# Patient Record
Sex: FEMALE | Race: WHITE | NOT HISPANIC OR LATINO | Employment: FULL TIME | URBAN - NONMETROPOLITAN AREA
[De-identification: names, ages, dates, MRNs, and addresses within clinical notes are randomized per-mention and may not be internally consistent; named-entity substitution may affect disease eponyms.]

---

## 2020-01-16 ENCOUNTER — LAB (OUTPATIENT)
Dept: LAB | Facility: OTHER | Age: 30
End: 2020-01-16

## 2020-01-16 ENCOUNTER — OFFICE VISIT (OUTPATIENT)
Dept: FAMILY MEDICINE CLINIC | Facility: CLINIC | Age: 30
End: 2020-01-16

## 2020-01-16 VITALS
DIASTOLIC BLOOD PRESSURE: 73 MMHG | HEART RATE: 77 BPM | OXYGEN SATURATION: 98 % | HEIGHT: 62 IN | TEMPERATURE: 97.9 F | SYSTOLIC BLOOD PRESSURE: 118 MMHG | WEIGHT: 228.3 LBS | RESPIRATION RATE: 16 BRPM | BODY MASS INDEX: 42.01 KG/M2

## 2020-01-16 DIAGNOSIS — Z13.21 ENCOUNTER FOR VITAMIN DEFICIENCY SCREENING: ICD-10-CM

## 2020-01-16 DIAGNOSIS — M47.812 PRIMARY OSTEOARTHRITIS OF CERVICAL SPINE: ICD-10-CM

## 2020-01-16 DIAGNOSIS — D50.9 MICROCYTIC ANEMIA: ICD-10-CM

## 2020-01-16 DIAGNOSIS — G89.29 CHRONIC BILATERAL LOW BACK PAIN WITH BILATERAL SCIATICA: ICD-10-CM

## 2020-01-16 DIAGNOSIS — M54.42 CHRONIC BILATERAL LOW BACK PAIN WITH BILATERAL SCIATICA: ICD-10-CM

## 2020-01-16 DIAGNOSIS — E66.9 ABDOMINAL OBESITY AND METABOLIC SYNDROME: ICD-10-CM

## 2020-01-16 DIAGNOSIS — Z02.83 ENCOUNTER FOR DRUG SCREENING: ICD-10-CM

## 2020-01-16 DIAGNOSIS — Z13.29 SCREENING FOR THYROID DISORDER: ICD-10-CM

## 2020-01-16 DIAGNOSIS — K21.9 GASTROESOPHAGEAL REFLUX DISEASE, ESOPHAGITIS PRESENCE NOT SPECIFIED: ICD-10-CM

## 2020-01-16 DIAGNOSIS — M54.2 CHRONIC MIDLINE POSTERIOR NECK PAIN: Primary | ICD-10-CM

## 2020-01-16 DIAGNOSIS — G89.29 CHRONIC MIDLINE POSTERIOR NECK PAIN: Primary | ICD-10-CM

## 2020-01-16 DIAGNOSIS — M54.41 CHRONIC BILATERAL LOW BACK PAIN WITH BILATERAL SCIATICA: ICD-10-CM

## 2020-01-16 DIAGNOSIS — M50.30 DDD (DEGENERATIVE DISC DISEASE), CERVICAL: ICD-10-CM

## 2020-01-16 DIAGNOSIS — E88.81 ABDOMINAL OBESITY AND METABOLIC SYNDROME: ICD-10-CM

## 2020-01-16 LAB
BASOPHILS # BLD AUTO: 0.08 10*3/MM3 (ref 0–0.2)
BASOPHILS NFR BLD AUTO: 0.8 % (ref 0–1.5)
DEPRECATED RDW RBC AUTO: 43.6 FL (ref 37–54)
EOSINOPHIL # BLD AUTO: 0.4 10*3/MM3 (ref 0–0.4)
EOSINOPHIL NFR BLD AUTO: 3.9 % (ref 0.3–6.2)
ERYTHROCYTE [DISTWIDTH] IN BLOOD BY AUTOMATED COUNT: 16 % (ref 12.3–15.4)
HCT VFR BLD AUTO: 37.9 % (ref 34–46.6)
HGB BLD-MCNC: 12.1 G/DL (ref 12–15.9)
LYMPHOCYTES # BLD AUTO: 3.01 10*3/MM3 (ref 0.7–3.1)
LYMPHOCYTES NFR BLD AUTO: 29.5 % (ref 19.6–45.3)
MCH RBC QN AUTO: 24.5 PG (ref 26.6–33)
MCHC RBC AUTO-ENTMCNC: 31.9 G/DL (ref 31.5–35.7)
MCV RBC AUTO: 76.9 FL (ref 79–97)
MONOCYTES # BLD AUTO: 0.57 10*3/MM3 (ref 0.1–0.9)
MONOCYTES NFR BLD AUTO: 5.6 % (ref 5–12)
NEUTROPHILS # BLD AUTO: 6.13 10*3/MM3 (ref 1.7–7)
NEUTROPHILS NFR BLD AUTO: 60.2 % (ref 42.7–76)
PLATELET # BLD AUTO: 332 10*3/MM3 (ref 140–450)
PMV BLD AUTO: 9.6 FL (ref 6–12)
RBC # BLD AUTO: 4.93 10*6/MM3 (ref 3.77–5.28)
WBC NRBC COR # BLD: 10.19 10*3/MM3 (ref 3.4–10.8)

## 2020-01-16 PROCEDURE — 84439 ASSAY OF FREE THYROXINE: CPT | Performed by: NURSE PRACTITIONER

## 2020-01-16 PROCEDURE — 84443 ASSAY THYROID STIM HORMONE: CPT | Performed by: NURSE PRACTITIONER

## 2020-01-16 PROCEDURE — 99214 OFFICE O/P EST MOD 30 MIN: CPT | Performed by: NURSE PRACTITIONER

## 2020-01-16 PROCEDURE — 82728 ASSAY OF FERRITIN: CPT | Performed by: NURSE PRACTITIONER

## 2020-01-16 PROCEDURE — 82746 ASSAY OF FOLIC ACID SERUM: CPT | Performed by: NURSE PRACTITIONER

## 2020-01-16 PROCEDURE — 82306 VITAMIN D 25 HYDROXY: CPT | Performed by: NURSE PRACTITIONER

## 2020-01-16 PROCEDURE — 82607 VITAMIN B-12: CPT | Performed by: NURSE PRACTITIONER

## 2020-01-16 PROCEDURE — 83525 ASSAY OF INSULIN: CPT | Performed by: NURSE PRACTITIONER

## 2020-01-16 PROCEDURE — 83540 ASSAY OF IRON: CPT | Performed by: NURSE PRACTITIONER

## 2020-01-16 PROCEDURE — 85025 COMPLETE CBC W/AUTO DIFF WBC: CPT | Performed by: NURSE PRACTITIONER

## 2020-01-16 PROCEDURE — G0481 DRUG TEST DEF 8-14 CLASSES: HCPCS | Performed by: NURSE PRACTITIONER

## 2020-01-16 PROCEDURE — 84466 ASSAY OF TRANSFERRIN: CPT | Performed by: NURSE PRACTITIONER

## 2020-01-16 PROCEDURE — 83036 HEMOGLOBIN GLYCOSYLATED A1C: CPT | Performed by: NURSE PRACTITIONER

## 2020-01-16 PROCEDURE — 85660 RBC SICKLE CELL TEST: CPT | Performed by: NURSE PRACTITIONER

## 2020-01-16 PROCEDURE — 80307 DRUG TEST PRSMV CHEM ANLYZR: CPT | Performed by: NURSE PRACTITIONER

## 2020-01-16 PROCEDURE — 83527 ASSAY OF INSULIN: CPT | Performed by: NURSE PRACTITIONER

## 2020-01-16 PROCEDURE — 84480 ASSAY TRIIODOTHYRONINE (T3): CPT | Performed by: NURSE PRACTITIONER

## 2020-01-16 PROCEDURE — 83021 HEMOGLOBIN CHROMOTOGRAPHY: CPT | Performed by: NURSE PRACTITIONER

## 2020-01-16 RX ORDER — MELOXICAM 7.5 MG/1
7.5 TABLET ORAL DAILY
Qty: 30 TABLET | Refills: 0 | Status: SHIPPED | OUTPATIENT
Start: 2020-01-16 | End: 2020-02-10 | Stop reason: ALTCHOICE

## 2020-01-16 RX ORDER — PANTOPRAZOLE SODIUM 40 MG/1
40 TABLET, DELAYED RELEASE ORAL DAILY
Qty: 90 TABLET | Refills: 1 | Status: SHIPPED | OUTPATIENT
Start: 2020-01-16 | End: 2020-09-10 | Stop reason: SDUPTHER

## 2020-01-16 NOTE — PROGRESS NOTES
Chief Complaint   Patient presents with   • Establish Care     Subjective   Marizol WATKINS is a 29 y.o. female who presents to the office for evaluation of posterior neck pain and superior shoulder/ upper back pain. Also with pain lower back nightly that wakes her.     The following portions of the patient's history were reviewed and updated as appropriate: allergies, current medications, past family history, past medical history, past social history, past surgical history and problem list.    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    Chronic neck pain:  She has been experiencing increasing pain posterior neck over spine for past year. Cervical spine xray 3/8/19 from [facility] shows mild DDD with some spurs at C5-6. Loss of cervical lordosis. Rates pain at 6/10. May go as high as 8/10, which makes her feel like crying. May get as low as 4/10 with rest.  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 Sometimes ice and heat application will help.  She has been seeing a chiropractor since November with mild to moderate improvement noted lasting for a day or two and it returns to previous levels.    Chronic bilateral lower back pain with intermittent bilateral sciatica, usually on right. Onset over a year ago. Currently rates at 2/10. May get as high as 8/10 with bending, long term standing or walking, lifting, long term sitting. Resting improves the pain, and most likely to be lowest pain when has been asleep a while, however sometimes when she wakes up it is also bad and there is much stiffness.    She has seen GIL Coats in the past and an xray is on file for neck.           1. AT C5-6, MILD DEGENERATIVE DISC AND SPUR CHANGE WITH LOSS OF THE NORMAL CERVICAL LORDOSIS.      2. NO ACUTE OR SUSPICIOUS FINDINGS.    [id number]   Result Narrative   CERVICAL SPINE WITH OBLIQUES 5  VIEWS    Comparisons: 2/20/2006    Indications: Numbness and tingling of both hands and arms, neck pain    Discussion: There is loss of the normal cervical lordosis centered about C5-6 similar to the prior exam. There are mild degenerative disc and spur changes at C5-6 which are new from the prior study. Oblique imaging shows no foraminal encroachment. The   graft there is no acute or suspicious finding and there are no subluxations. The paraspinal soft tissues are normal.   Other Result Information   Gene, Rad Results In - 03/08/2019 12:50 PM CST       Past Medical History:   Diagnosis Date   • Acute bronchitis    • Anxiety    • Bipolar disease, chronic (CMS/HCC)    • Cough    • Itch of skin    • Mild depression (CMS/HCC)    • Visit for gynecologic examination           Family History   Problem Relation Age of Onset   • No Known Problems Mother    • Diabetes Father    • Heart disease Father    • Hypertension Maternal Grandmother    • Breast cancer Paternal Grandmother    • Colon cancer Neg Hx    • Endometrial cancer Neg Hx    • Ovarian cancer Neg Hx         Review of Systems   Constitutional: Positive for fatigue. Negative for fever and unexpected weight change.   HENT: Negative.    Eyes: Negative.    Respiratory: Negative.  Negative for cough, chest tightness and shortness of breath.    Cardiovascular: Negative.  Negative for chest pain.   Gastrointestinal: Negative.    Endocrine: Negative.    Genitourinary: Negative.  Negative for dysuria.   Musculoskeletal: Positive for arthralgias, back pain and neck pain.   Skin: Negative.  Negative for color change, pallor, rash and wound.   Allergic/Immunologic: Negative.    Neurological: Negative.    Hematological: Negative.    Psychiatric/Behavioral: Negative.  Negative for sleep disturbance and suicidal ideas.   All other systems reviewed and are negative.      Objective   Vitals:    01/16/20 1550   BP: 118/73   BP Location: Left arm   Patient Position: Sitting   Cuff  "Size: Adult   Pulse: 77   Resp: 16   Temp: 97.9 °F (36.6 °C)   TempSrc: Tympanic   SpO2: 98%   Weight: 104 kg (228 lb 4.8 oz)   Height: 157.5 cm (62\")   PainSc:   6   PainLoc: Neck     Physical Exam   Constitutional: She is oriented to person, place, and time. She appears well-developed and well-nourished.   HENT:   Head: Normocephalic and atraumatic.   Eyes: Pupils are equal, round, and reactive to light. Conjunctivae are normal.   Neck: Normal range of motion. Neck supple.   Cardiovascular: Normal rate, regular rhythm, normal heart sounds and intact distal pulses. Exam reveals no gallop and no friction rub.   No murmur heard.  Pulmonary/Chest: Effort normal and breath sounds normal. No respiratory distress. She has no wheezes. She has no rales. She exhibits no tenderness.   Abdominal: Soft. Bowel sounds are normal.   Musculoskeletal: Normal range of motion. She exhibits no edema, tenderness or deformity.   Lymphadenopathy:     She has no cervical adenopathy.   Neurological: She is alert and oriented to person, place, and time.   Skin: Skin is warm and dry. Capillary refill takes 2 to 3 seconds. No erythema. No pallor.   Psychiatric: She has a normal mood and affect. Her behavior is normal. Judgment and thought content normal.   Nursing note and vitals reviewed.      Assessment/Plan   Marizol was seen today for establish care.    Diagnoses and all orders for this visit:    Chronic midline posterior neck pain    Chronic bilateral low back pain with bilateral sciatica  -     XR Spine Lumbar 2 or 3 View    DDD (degenerative disc disease), cervical    Abdominal obesity and metabolic syndrome  -     CBC & Differential  -     Hemoglobin A1c  -     Insulin, Free & Total, Serum    Microcytic anemia  -     CBC & Differential  -     Ferritin  -     Iron Profile  -     Hgb. Frac. Profile; Future    Screening for thyroid disorder  -     T4, Free  -     TSH  -     T3    Encounter for drug screening  -     ToxASSURE Select 13 " Discrete -    Encounter for vitamin deficiency screening  -     Vitamin B12 & Folate  -     Vitamin D 25 Hydroxy    Gastroesophageal reflux disease, esophagitis presence not specified  -     pantoprazole (PROTONIX) 40 MG EC tablet; Take 1 tablet by mouth Daily. For acid reflux    Primary osteoarthritis of cervical spine  -     meloxicam (MOBIC) 7.5 MG tablet; Take 1 tablet by mouth Daily. For arthritis pain           PHQ-2/PHQ-9 Depression Screening 1/16/2020   Little interest or pleasure in doing things 2   Feeling down, depressed, or hopeless 2   Trouble falling or staying asleep, or sleeping too much 2   Feeling tired or having little energy 2   Poor appetite or overeating 2   Feeling bad about yourself - or that you are a failure or have let yourself or your family down 0   Trouble concentrating on things, such as reading the newspaper or watching television 2   Moving or speaking so slowly that other people could have noticed. Or the opposite - being so fidgety or restless that you have been moving around a lot more than usual 0   Thoughts that you would be better off dead, or of hurting yourself in some way 0   Total Score 12       GIL Darling         Return in about 1 week (around 1/23/2020).    There are no Patient Instructions on file for this visit.

## 2020-01-17 LAB
25(OH)D3 SERPL-MCNC: 25.3 NG/ML (ref 30–100)
FERRITIN SERPL-MCNC: 9.96 NG/ML (ref 13–150)
FOLATE SERPL-MCNC: 9.91 NG/ML (ref 4.78–24.2)
HBA1C MFR BLD: 5.7 % (ref 4.8–5.6)
IRON 24H UR-MRATE: 34 MCG/DL (ref 37–145)
IRON SATN MFR SERPL: 7 % (ref 20–50)
T3 SERPL-MCNC: 150 NG/DL (ref 80–200)
T4 FREE SERPL-MCNC: 1.35 NG/DL (ref 0.93–1.7)
TIBC SERPL-MCNC: 514 MCG/DL (ref 298–536)
TRANSFERRIN SERPL-MCNC: 345 MG/DL (ref 200–360)
TSH SERPL DL<=0.05 MIU/L-ACNC: 1.72 UIU/ML (ref 0.27–4.2)
VIT B12 BLD-MCNC: 587 PG/ML (ref 211–946)

## 2020-01-20 LAB
HGB A MFR BLD: 98.1 % (ref 96.4–98.8)
HGB A2 MFR BLD COLUMN CHROM: 1.9 % (ref 1.8–3.2)
HGB C MFR BLD: 0 %
HGB F MFR BLD: 0 % (ref 0–2)
HGB FRACT BLD-IMP: NORMAL
HGB S BLD QL SOLY: NEGATIVE
HGB S MFR BLD: 0 %

## 2020-01-22 LAB
6-ACETYLMORPHINE: NEGATIVE
6MAM SERPLBLD-MCNC: NOT DETECTED NG/MG CREAT
7-AMINOCLONAZEPAM UR: NOT DETECTED NG/MG CREAT
A-OH ALPRAZ/CREAT UR: NOT DETECTED NG/MG CREAT
ALFENTANIL UR QL: NOT DETECTED NG/MG CREAT
ALPHA-HYDROXYMIDAZOLAM, URINE: NOT DETECTED NG/MG CREAT
ALPHA-HYDROXYTRIAZOLAM, URINE: NOT DETECTED NG/MG CREAT
AMOBARBITAL UR: NOT DETECTED
AMPHET UR QL CFM: NOT DETECTED NG/MG CREAT
AMPHETAMINES UR QL SCN: NEGATIVE
BARBITAL UR QL CFM: NOT DETECTED
BARBITURATES UR QL SCN: NEGATIVE
BENZODIAZ UR QL SCN: NEGATIVE
BENZOYLECGONINE UR: NOT DETECTED NG/MG CREAT
BUPRENORPHINE UR QL: NEGATIVE
BUPRENORPHINE UR QL: NOT DETECTED NG/MG CREAT
BUTABARBITAL UR QL: NOT DETECTED
BUTALBITAL UR QL: NOT DETECTED
CANNABINOIDS UR QL CFM: NEGATIVE
CLONAZEPAM UR QL: NOT DETECTED NG/MG CREAT
COCAETHYLENE UR QL CFM: NOT DETECTED NG/MG CREAT
COCAINE UR QL CFM: NEGATIVE
CODEINE UR QL: NOT DETECTED NG/MG CREAT
CONV COCAINE, UR: NOT DETECTED NG/MG CREAT
CONV REPORT SUMMARY: NORMAL
CREAT 24H UR-MCNC: 231 MG/DL
DESALKYLFLURAZ/CREAT UR: NOT DETECTED NG/MG CREAT
DESMETHYLFLUNITRAZEPAM: NOT DETECTED NG/MG CREAT
DIAZEPAM UR-MCNC: NOT DETECTED NG/MG CREAT
DIHYDROCODEINE UR: 27 NG/MG CREAT
EDDP SERPL QL: NOT DETECTED NG/MG CREAT
ETHANOL SCREEN URINE (REF): NEGATIVE
ETHANOL UR-MCNC: NOT DETECTED G/DL
FENTANYL UR QL: NEGATIVE
FENTANYL+NORFENTANYL UR QL SCN: NOT DETECTED NG/MG CREAT
FLUNITRAZEPAM SERPLBLD-MCNC: NOT DETECTED NG/MG CREAT
HYDROCODONE UR QL: 45 NG/MG CREAT
HYDROMORPHONE UR QL: NOT DETECTED NG/MG CREAT
LEVEL OF DETECTION:: NORMAL
LORAZEPAM UR-MCNC: NOT DETECTED NG/MG CREAT
LORAZEPAM/CREAT UR: NOT DETECTED NG/MG CREAT
MDA SERPLBLD-MCNC: NOT DETECTED NG/MG CREAT
MDMA UR QL SCN: NOT DETECTED NG/MG CREAT
MEPHOBARBITAL UR QL CFM: NOT DETECTED
METHADONE BLD QL SCN: NEGATIVE
METHADONE, URINE: NOT DETECTED NG/MG CREAT
METHAMPHETAMINE UR: NOT DETECTED NG/MG CREAT
MIDAZOLAM UR-MCNC: NOT DETECTED NG/MG CREAT
MORPHINE UR QL: NOT DETECTED NG/MG CREAT
N-DESMETHYLTRAMADOL, U: NOT DETECTED NG/MG CREAT
NARCOTICS UR: NEGATIVE
NORBUPRENORPHINE SERPLBLD-MCNC: NOT DETECTED NG/MG CREAT
NORCODEINE UR-MCNC: NOT DETECTED NG/MG CREAT
NORDIAZEPAM SERPL CFM-MCNC: NOT DETECTED NG/MG CREAT
NORFENTANYL UR: NOT DETECTED NG/MG CREAT
NORMORPHINE: NOT DETECTED NG/MG CREAT
NOROXYMORPHONE: NOT DETECTED NG/MG CREAT
O-DESMETHYLTRAMADOL, UR: NOT DETECTED NG/MG CREAT
OPIATES UR QL CFM: NORMAL
OPIATES, URINE, NORHYDROCODONE: 63 NG/MG CREAT
OPIATES, URINE, NOROXYCODONE: NOT DETECTED NG/MG CREAT
OXAZEPAM UR QL: NOT DETECTED NG/MG CREAT
OXYCODONE UR QL: NEGATIVE
OXYCODONE UR: NOT DETECTED NG/MG CREAT
OXYMORPHONE UR: NOT DETECTED NG/MG CREAT
PENTOBARBITAL UR: NOT DETECTED
PHENOBARB UR QL: NOT DETECTED
SECOBARBITAL UR QL: NOT DETECTED
SUFENTANIL UR QL: NOT DETECTED NG/MG CREAT
TAPENTADOL SERPLBLD-MCNC: NEGATIVE NG/ML
TAPENTADOL UR-MCNC: NOT DETECTED NG/MG CREAT
TEMAZEPAM UR QL CFM: NOT DETECTED NG/MG CREAT
THC UR CFM-MCNC: NOT DETECTED NG/MG CREAT
THIOPENTAL UR QL CFM: NOT DETECTED
TRAMADOL UR: NOT DETECTED NG/MG CREAT

## 2020-01-23 ENCOUNTER — OFFICE VISIT (OUTPATIENT)
Dept: FAMILY MEDICINE CLINIC | Facility: CLINIC | Age: 30
End: 2020-01-23

## 2020-01-23 VITALS
OXYGEN SATURATION: 99 % | HEART RATE: 99 BPM | TEMPERATURE: 98.6 F | DIASTOLIC BLOOD PRESSURE: 64 MMHG | WEIGHT: 229.8 LBS | HEIGHT: 62 IN | BODY MASS INDEX: 42.29 KG/M2 | SYSTOLIC BLOOD PRESSURE: 104 MMHG | RESPIRATION RATE: 16 BRPM

## 2020-01-23 DIAGNOSIS — M54.41 CHRONIC BILATERAL LOW BACK PAIN WITH BILATERAL SCIATICA: ICD-10-CM

## 2020-01-23 DIAGNOSIS — M54.2 CHRONIC MIDLINE POSTERIOR NECK PAIN: ICD-10-CM

## 2020-01-23 DIAGNOSIS — E61.1 IRON DEFICIENCY: ICD-10-CM

## 2020-01-23 DIAGNOSIS — R73.03 PREDIABETES: ICD-10-CM

## 2020-01-23 DIAGNOSIS — G89.29 CHRONIC BILATERAL LOW BACK PAIN WITH BILATERAL SCIATICA: ICD-10-CM

## 2020-01-23 DIAGNOSIS — E55.9 VITAMIN D DEFICIENCY: ICD-10-CM

## 2020-01-23 DIAGNOSIS — M54.42 CHRONIC BILATERAL LOW BACK PAIN WITH BILATERAL SCIATICA: ICD-10-CM

## 2020-01-23 DIAGNOSIS — M50.30 DDD (DEGENERATIVE DISC DISEASE), CERVICAL: ICD-10-CM

## 2020-01-23 DIAGNOSIS — G89.29 CHRONIC MIDLINE POSTERIOR NECK PAIN: ICD-10-CM

## 2020-01-23 DIAGNOSIS — M51.34 DDD (DEGENERATIVE DISC DISEASE), THORACIC: Primary | ICD-10-CM

## 2020-01-23 PROCEDURE — 99214 OFFICE O/P EST MOD 30 MIN: CPT | Performed by: NURSE PRACTITIONER

## 2020-01-23 RX ORDER — LANOLIN ALCOHOL/MO/W.PET/CERES
325 CREAM (GRAM) TOPICAL
Qty: 90 TABLET | Refills: 0 | Status: SHIPPED | OUTPATIENT
Start: 2020-01-23 | End: 2020-04-22 | Stop reason: SDUPTHER

## 2020-01-23 RX ORDER — HYDROCODONE BITARTRATE AND ACETAMINOPHEN 5; 325 MG/1; MG/1
1 TABLET ORAL 2 TIMES DAILY PRN
Qty: 30 TABLET | Refills: 0 | Status: SHIPPED | OUTPATIENT
Start: 2020-01-23 | End: 2020-01-28 | Stop reason: SDUPTHER

## 2020-01-23 RX ORDER — METFORMIN HYDROCHLORIDE 500 MG/1
1000 TABLET, EXTENDED RELEASE ORAL
Qty: 30 TABLET | Refills: 5 | Status: SHIPPED | OUTPATIENT
Start: 2020-01-23 | End: 2020-04-22 | Stop reason: SDUPTHER

## 2020-01-23 RX ORDER — HYDROCODONE BITARTRATE AND ACETAMINOPHEN 5; 325 MG/1; MG/1
1 TABLET ORAL 2 TIMES DAILY PRN
Qty: 30 TABLET | Refills: 0 | Status: SHIPPED | OUTPATIENT
Start: 2020-01-23 | End: 2020-01-23 | Stop reason: SDUPTHER

## 2020-01-23 NOTE — PATIENT INSTRUCTIONS
Vitamin D Deficiency  Vitamin D deficiency is when your body does not have enough vitamin D. Vitamin D is important to your body because:  · It helps your body use other minerals.  · It helps to keep your bones strong and healthy.  · It may help to prevent some diseases.  · It helps your heart and other muscles work well.  Not getting enough vitamin D can make your bones soft. It can also cause other health problems.  What are the causes?  This condition may be caused by:  · Not eating enough foods that contain vitamin D.  · Not getting enough sun.  · Having diseases that make it hard for your body to absorb vitamin D.  · Having a surgery in which a part of the stomach or a part of the small intestine is removed.  · Having kidney disease or liver disease.  What increases the risk?  You are more likely to get this condition if:  · You are older.  · You do not spend much time outdoors.  · You live in a nursing home.  · You have had broken bones.  · You have weak or thin bones (osteoporosis).  · You have a disease or condition that changes how your body absorbs vitamin D.  · You have dark skin.  · You take certain medicines.  · You are overweight or obese.  What are the signs or symptoms?  · In mild cases, there may not be any symptoms. If the condition is very bad, symptoms may include:  ? Bone pain.  ? Muscle pain.  ? Falling often.  ? Broken bones caused by a minor injury.  How is this treated?  Treatment may include taking supplements as told by your doctor. Your doctor will tell you what dose is best for you. Supplements may include:  · Vitamin D.  · Calcium.  Follow these instructions at home:  Eating and drinking    · Eat foods that contain vitamin D, such as:  ? Dairy products, cereals, or juices with added vitamin D. Check the label.  ? Fish, such as salmon or trout.  ? Eggs.  ? Oysters.  ? Mushrooms.  The items listed above may not be a complete list of what you can eat and drink. Contact a dietitian for more  options.  General instructions  · Take medicines and supplements only as told by your doctor.  · Get regular, safe exposure to natural sunlight.  · Do not use a tanning bed.  · Maintain a healthy weight. Lose weight if needed.  · Keep all follow-up visits as told by your doctor. This is important.  How is this prevented?  · You can get vitamin D by:  ? Eating foods that naturally contain vitamin D.  ? Eating or drinking products that have vitamin D added to them, such as cereals, juices, and milk.  ? Taking vitamin D or a multivitamin that contains vitamin D.  ? Being in the sun. Your body makes vitamin D when your skin is exposed to sunlight. Your body changes the sunlight into a form of the vitamin that it can use.  Contact a doctor if:  · Your symptoms do not go away.  · You feel sick to your stomach (nauseous).  · You throw up (vomit).  · You poop less often than normal, or you have trouble pooping (constipation).  Summary  · Vitamin D deficiency is when your body does not have enough vitamin D.  · Vitamin D helps to keep your bones strong and healthy.  · This condition is often treated by taking a supplement.  · Your doctor will tell you what dose is best for you.  This information is not intended to replace advice given to you by your health care provider. Make sure you discuss any questions you have with your health care provider.  Document Released: 12/06/2012 Document Revised: 08/26/2019 Document Reviewed: 08/26/2019  [employer] Interactive Patient Education © 2019 [employer]    Iron-Rich Diet    Iron is a mineral that helps your body to produce hemoglobin. Hemoglobin is a protein in red blood cells that carries oxygen to your body's tissues. Eating too little iron may cause you to feel weak and tired, and it can increase your risk of infection. Iron is naturally found in many foods, and many foods have iron added to them (iron-fortified foods).  You may need to follow an iron-rich diet if you do not have  enough iron in your body due to certain medical conditions. The amount of iron that you need each day depends on your age, your sex, and any medical conditions you have. Follow instructions from your health care provider or a diet and nutrition specialist (dietitian) about how much iron you should eat each day.  What are tips for following this plan?  Reading food labels  · Check food labels to see how many milligrams (mg) of iron are in each serving.  Cooking  · Cook foods in pots and pans that are made from iron.  · Take these steps to make it easier for your body to absorb iron from certain foods:  ? Soak beans overnight before cooking.  ? Soak whole grains overnight and drain them before using.  ? Ferment flours before baking, such as by using yeast in bread dough.  Meal planning  · When you eat foods that contain iron, you should eat them with foods that are high in vitamin C. These include oranges, peppers, tomatoes, potatoes, and [place]. Vitamin C helps your body to absorb iron.  General information  · Take iron supplements only as told by your health care provider. An overdose of iron can be life-threatening. If you were prescribed iron supplements, take them with orange juice or a vitamin C supplement.  · When you eat iron-fortified foods or take an iron supplement, you should also eat foods that naturally contain iron, such as meat, poultry, and fish. Eating naturally iron-rich foods helps your body to absorb the iron that is added to other foods or contained in a supplement.  · Certain foods and drinks prevent your body from absorbing iron properly. Avoid eating these foods in the same meal as iron-rich foods or with iron supplements. These foods include:  ? Coffee, black tea, and red wine.  ? Milk, dairy products, and foods that are high in calcium.  ? Beans and soybeans.  ? Whole grains.  What foods should I eat?  Fruits  Prunes. Raisins.  Eat fruits high in vitamin C, such as oranges, grapefruits, and  strawberries, alongside iron-rich foods.  Vegetables  Spinach (cooked). Green peas. Broccoli. Fermented vegetables.  Eat vegetables high in vitamin C, such as leafy greens, potatoes, bell peppers, and tomatoes, alongside iron-rich foods.  Grains  Iron-fortified breakfast cereal. Iron-fortified whole-wheat bread. Enriched rice. Sprouted grains.  Meats and other proteins  Beef liver. Oysters. Beef. Shrimp. Turkey. Chicken. Tuna. Sardines. Chickpeas. Nuts. Tofu. Pumpkin seeds.  Beverages  Tomato juice. Fresh orange juice. Prune juice. Hibiscus tea. Fortified instant breakfast shakes.  Sweets and desserts  Blackstrap molasses.  Seasonings and condiments  Tahini. Fermented soy sauce.  Other foods  Wheat germ.  The items listed above may not be a complete list of recommended foods and beverages. Contact a dietitian for more information.  What foods should I avoid?  Grains  Whole grains. Bran cereal. Bran flour. Oats.  Meats and other proteins  Soybeans. Products made from soy protein. Black beans. Lentils. Mung beans. Split peas.  Dairy  Milk. Cream. Cheese. Yogurt. Cottage cheese.  Beverages  Coffee. Black tea. Red wine.  Sweets and desserts  Cocoa. Chocolate. Ice cream.  Other foods  Basil. Oregano. Large amounts of parsley.  The items listed above may not be a complete list of foods and beverages to avoid. Contact a dietitian for more information.  Summary  · Iron is a mineral that helps your body to produce hemoglobin. Hemoglobin is a protein in red blood cells that carries oxygen to your body's tissues.  · Iron is naturally found in many foods, and many foods have iron added to them (iron-fortified foods).  · When you eat foods that contain iron, you should eat them with foods that are high in vitamin C. Vitamin C helps your body to absorb iron.  · Certain foods and drinks prevent your body from absorbing iron properly, such as whole grains and dairy products. You should avoid eating these foods in the same meal as  iron-rich foods or with iron supplements.  This information is not intended to replace advice given to you by your health care provider. Make sure you discuss any questions you have with your health care provider.  Document Released: 08/01/2006 Document Revised: 11/13/2018 Document Reviewed: 11/13/2018  [employer] Interactive Patient Education © 2019 [employer]    Hyperglycemia  Hyperglycemia is when the sugar (glucose) level in your blood is too high. It may not cause symptoms. If you do have symptoms, they may include warning signs, such as:  · Feeling more thirsty than normal.  · Hunger.  · Feeling tired.  · Needing to pee (urinate) more than normal.  · Blurry eyesight (vision).  You may get other symptoms as it gets worse, such as:  · Dry mouth.  · Not being hungry (loss of appetite).  · Fruity-smelling breath.  · Weakness.  · Weight gain or loss that is not planned. Weight loss may be fast.  · A tingling or numb feeling in your hands or feet.  · Headache.  · Skin that does not bounce back quickly when it is lightly pinched and released (poor skin turgor).  · Pain in your belly (abdomen).  · Cuts or bruises that heal slowly.  High blood sugar can happen to people who do or do not have diabetes. High blood sugar can happen slowly or quickly, and it can be an emergency.  Follow these instructions at home:  General instructions  · Take over-the-counter and prescription medicines only as told by your doctor.  · Do not use products that contain nicotine or tobacco, such as cigarettes and e-cigarettes. If you need help quitting, ask your doctor.  · Limit alcohol intake to no more than 1 drink per day for nonpregnant women and 2 drinks per day for men. One drink equals 12 oz of beer, 5 oz of wine, or 1½ oz of hard liquor.  · Manage stress. If you need help with this, ask your doctor.  · Keep all follow-up visits as told by your doctor. This is important.  Eating and drinking    · Stay at a healthy weight.  · Exercise  regularly, as told by your doctor.  · Drink enough fluid, especially when you:  ? Exercise.  ? Get sick.  ? Are in hot temperatures.  · Eat healthy foods, such as:  ? Low-fat (lean) proteins.  ? Complex carbs (complex carbohydrates), such as whole wheat bread or brown rice.  ? Fresh fruits and vegetables.  ? Low-fat dairy products.  ? Healthy fats.  · Drink enough fluid to keep your pee (urine) clear or pale yellow.  If you have diabetes:    · Make sure you know the symptoms of hyperglycemia.  · Follow your diabetes management plan, as told by your doctor. Make sure you:  ? Take insulin and medicines as told.  ? Follow your exercise plan.  ? Follow your meal plan. Eat on time. Do not skip meals.  ? Check your blood sugar as often as told. Make sure to check before and after exercise. If you exercise longer or in a different way than you normally do, check your blood sugar more often.  ? Follow your sick day plan whenever you cannot eat or drink normally. Make this plan ahead of time with your doctor.  · Share your diabetes management plan with people in your workplace, school, and household.  · Check your urine for ketones when you are ill and as told by your doctor.  · Carry a card or wear jewelry that says that you have diabetes.  Contact a doctor if:  · Your blood sugar level is higher than 240 mg/dL (13.3 mmol/L) for 2 days in a row.  · You have problems keeping your blood sugar in your target range.  · High blood sugar happens often for you.  Get help right away if:  · You have trouble breathing.  · You have a change in how you think, feel, or act (mental status).  · You feel sick to your stomach (nauseous), and that feeling does not go away.  · You cannot stop throwing up (vomiting).  These symptoms may be an emergency. Do not wait to see if the symptoms will go away. Get medical help right away. Call your local emergency services (911 in the [place] Do not drive yourself to the  [facility].  Summary  · Hyperglycemia is when the sugar (glucose) level in your blood is too high.  · High blood sugar can happen to people who do or do not have diabetes.  · Make sure you drink enough fluids, eat healthy foods, and exercise regularly.  · Contact your doctor if you have problems keeping your blood sugar in your target range.  This information is not intended to replace advice given to you by your health care provider. Make sure you discuss any questions you have with your health care provider.  Document Released: 10/15/2010 Document Revised: 09/04/2017 Document Reviewed: 09/04/2017  [employer] Interactive Patient Education © 2019 [employer]    Chronic Pain, Adult  Chronic pain is a type of pain that lasts or keeps coming back (recurs) for at least six months. You may have chronic headaches, abdominal pain, or body pain. Chronic pain may be related to an illness, such as fibromyalgia or complex regional pain syndrome. Sometimes the cause of chronic pain is not known.  Chronic pain can make it hard for you to do daily activities. If not treated, chronic pain can lead to other health problems, including anxiety and depression. Treatment depends on the cause and severity of your pain. You may need to work with a pain specialist to come up with a treatment plan. The plan may include medicine, counseling, and physical therapy. Many people benefit from a combination of two or more types of treatment to control their pain.  Follow these instructions at home:  Lifestyle  · Consider keeping a pain diary to share with your health care providers.  · Consider talking with a mental health care provider (psychologist) about how to cope with chronic pain.  · Consider joining a chronic pain support group.  · Try to control or lower your stress levels. Talk to your health care provider about strategies to do this.  General instructions    · Take over-the-counter and prescription medicines only as told by your health  care provider.  · Follow your treatment plan as told by your health care provider. This may include:  ? Gentle, regular exercise.  ? Eating a healthy diet that includes foods such as vegetables, fruits, fish, and lean meats.  ? Cognitive or behavioral therapy.  ? Working with a physical therapist.  ? Meditation or yoga.  ? Acupuncture or massage therapy.  ? Aroma, color, light, or sound therapy.  ? Local electrical stimulation.  ? Shots (injections) of numbing or pain-relieving medicines into the spine or the area of pain.  · Check your pain level as told by your health care provider. Ask your health care provider if you should use a pain scale.  · Learn as much as you can about how to manage your chronic pain. Ask your health care provider if an intensive pain rehabilitation program or a chronic pain specialist would be helpful.  · Keep all follow-up visits as told by your health care provider. This is important.  Contact a health care provider if:  · Your pain gets worse.  · You have new pain.  · You have trouble sleeping.  · You have trouble doing your normal activities.  · Your pain is not controlled with treatment.  · Your have side effects from pain medicine.  · You feel weak.  Get help right away if:  · You lose feeling or have numbness in your body.  · You lose control of bowel or bladder function.  · Your pain suddenly gets much worse.  · You develop shaking or chills.  · You develop confusion.  · You develop chest pain.  · You have trouble breathing or shortness of breath.  · You pass out.  · You have thoughts about hurting yourself or others.  This information is not intended to replace advice given to you by your health care provider. Make sure you discuss any questions you have with your health care provider.  Document Released: 09/09/2003 Document Revised: 08/17/2017 Document Reviewed: 06/06/2017  [employer] Interactive Patient Education © 2019 [employer]

## 2020-01-23 NOTE — PROGRESS NOTES
Chief Complaint   Patient presents with   • Follow-up     1 week- labs     Subjective   Marizol WATKINS is a 29 y.o. female who presents to the office for 1 week follow up for xray results and recent lab result review and treatment.    The following portions of the patient's history were reviewed and updated as appropriate: allergies, current medications, past family history, past medical history, past social history, past surgical history and problem list.    History of Present Illness   xrays lumbar spine 1/16/2020, results below  Iron deficiency iron and iron stores are low. Information given for iron rich diet and iron supplement ordered  Vitamin D deficiency new diagnosis, will begin replacement  Prediabetes level glucose new diagnosis will begin metformin  UDS abnormal, positive for hydrocodone without a prescription. According to results this could occur as metabolites of tylenol #3 which she does admit to having taken one or two of in past few weeks, and was prescribed legally last year.       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     Chronic neck pain:  She has been experiencing increasing pain posterior neck over spine for past year. Cervical spine xray 3/8/19 from [facility] shows mild DDD with some spurs at C5-6. Loss of cervical lordosis. Rates pain at 4/10. May go as high as 8/10, which makes her feel like crying. May get as low as 4/10 with rest.  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 Sometimes ice and heat application will help.  She has been seeing a chiropractor since November with mild to moderate improvement noted lasting for a day or two and it returns to previous levels.  Reports has only had one dose of meloxicam, obtained and started yesterday. Would like to try a low dose hydrocodone for PRN use.     Chronic bilateral lower back pain with intermittent bilateral sciatica, usually on  right. Onset over a year ago. Currently rates at 4/10. May get as high as 8/10 with bending, long term standing or walking, lifting, long term sitting. Resting improves the pain, and most likely to be lowest pain when has been asleep a while, however sometimes when she wakes up it is also bad and there is much stiffness. Reports no noted change with the single dose of meloxicam she obtained yesterday and started. Would like to try a low dose hydrocodone for PRN use.      She has seen GIL Coats in the past and an xray is on file for neck.                   1. AT C5-6, MILD DEGENERATIVE DISC AND SPUR CHANGE WITH LOSS OF THE NORMAL CERVICAL LORDOSIS.      2. NO ACUTE OR SUSPICIOUS FINDINGS.    [id number]   Result Narrative   CERVICAL SPINE WITH OBLIQUES 5 VIEWS    Comparisons: 2/20/2006    Indications: Numbness and tingling of both hands and arms, neck pain    Discussion: There is loss of the normal cervical lordosis centered about C5-6 similar to the prior exam. There are mild degenerative disc and spur changes at C5-6 which are new from the prior study. Oblique imaging shows no foraminal encroachment. The   graft there is no acute or suspicious finding and there are no subluxations. The paraspinal soft tissues are normal.   Other Result Information    Gene, Rad Results In - 03/08/2019 12:50 PM CST              Three view lumbar spine     HISTORY: Chronic low back pain. Lumbago with bilateral sciatica.     AP and lateral radiographs of the lumbar spine and spot film of  the lumbosacral junction were obtained.     COMPARISON: None.     FINDINGS:   There is a normal lordosis.   Vertebral height, disc spaces and alignment are maintained.  No fracture.  The pedicles are intact.  Mild degenerative changes lower thoracic spine.     1.2 cm right renal calculus.  4 mm calcification to the right of the L5 transverse process,  somewhat lateral and ventral to be a ureteral calculus, perhaps a  small calcified  "mesenteric lymph node.     IMPRESSION:  CONCLUSION:  Normal lumbar spine.  Mild degenerative changes lower thoracic spine.  1.2 cm right renal calculus.  4 mm calcification to the right of the L5 transverse process,  somewhat lateral and ventral to be a ureteral calculus, perhaps a  small calcified mesenteric lymph node.     [id number]     Electronically signed by:  Jethro Vasquez MD  1/17/2020 2:59 PM CST  Workstation: [id number]  HPI, ROS  and PE from most recent  Visit carried forward and updated as appropriate for current situation.  Past Medical History:   Diagnosis Date   • Acute bronchitis    • Anxiety    • Bipolar disease, chronic (CMS/HCC)    • Cough    • Itch of skin    • Mild depression (CMS/HCC)    • Visit for gynecologic examination           Family History   Problem Relation Age of Onset   • No Known Problems Mother    • Diabetes Father    • Heart disease Father    • Hypertension Maternal Grandmother    • Breast cancer Paternal Grandmother    • Colon cancer Neg Hx    • Endometrial cancer Neg Hx    • Ovarian cancer Neg Hx         Review of Systems   Constitutional: Positive for fatigue.        Always feel \"tired\"   Respiratory: Positive for cough.    Cardiovascular: Negative.    Genitourinary: Negative.    Musculoskeletal: Positive for back pain.        Neck and shoulder pain a \"4\" like a dull ache at times \"sharp\". Lower mid back and between shoulders with intermittent sciatica  Using Ibuprofen for pain prn       Objective   Vitals:    01/23/20 1319   BP: 104/64   BP Location: Left arm   Patient Position: Sitting   Cuff Size: Adult   Pulse: 99   Resp: 16   Temp: 98.6 °F (37 °C)   TempSrc: Tympanic   SpO2: 99%   Weight: 104 kg (229 lb 12.8 oz)   Height: 157.5 cm (62\")   PainSc:   4   PainLoc: Back     Physical Exam   Constitutional: She appears well-developed and well-nourished.   Cardiovascular: Normal rate, regular rhythm and normal heart sounds.   Pulmonary/Chest: Effort normal and breath sounds normal.   "   Neurological: She is alert.   Psychiatric: She has a normal mood and affect.   Nursing note and vitals reviewed.      Assessment/Plan   Marizol was seen today for follow-up.    Diagnoses and all orders for this visit:    DDD (degenerative disc disease), thoracic  -     Discontinue: HYDROcodone-acetaminophen (NORCO) 5-325 MG per tablet; Take 1 tablet by mouth 2 (Two) Times a Day As Needed for Moderate Pain .  -     HYDROcodone-acetaminophen (NORCO) 5-325 MG per tablet; Take 1 tablet by mouth 2 (Two) Times a Day As Needed for Moderate Pain .    Iron deficiency  -     ferrous sulfate 325 (65 FE) MG EC tablet; Take 1 tablet by mouth Daily With Breakfast. For low iron. Labs due after every 90 days therapy    Vitamin D deficiency  -     cholecalciferol (VITAMIN D3) 1.25 MG (07623 UT) capsule; Take 1 capsule by mouth Every 7 (Seven) Days.    Prediabetes  -     metFORMIN ER (GLUCOPHAGE-XR) 500 MG 24 hr tablet; Take 2 tablets by mouth Daily With Dinner.    Chronic bilateral low back pain with bilateral sciatica  -     Discontinue: HYDROcodone-acetaminophen (NORCO) 5-325 MG per tablet; Take 1 tablet by mouth 2 (Two) Times a Day As Needed for Moderate Pain .  -     HYDROcodone-acetaminophen (NORCO) 5-325 MG per tablet; Take 1 tablet by mouth 2 (Two) Times a Day As Needed for Moderate Pain .    Chronic midline posterior neck pain  -     Discontinue: HYDROcodone-acetaminophen (NORCO) 5-325 MG per tablet; Take 1 tablet by mouth 2 (Two) Times a Day As Needed for Moderate Pain .  -     HYDROcodone-acetaminophen (NORCO) 5-325 MG per tablet; Take 1 tablet by mouth 2 (Two) Times a Day As Needed for Moderate Pain .    DDD (degenerative disc disease), cervical  -     Discontinue: HYDROcodone-acetaminophen (NORCO) 5-325 MG per tablet; Take 1 tablet by mouth 2 (Two) Times a Day As Needed for Moderate Pain .  -     HYDROcodone-acetaminophen (NORCO) 5-325 MG per tablet; Take 1 tablet by mouth 2 (Two) Times a Day As Needed for Moderate Pain  .           PHQ-2/PHQ-9 Depression Screening 1/16/2020   Little interest or pleasure in doing things 2   Feeling down, depressed, or hopeless 2   Trouble falling or staying asleep, or sleeping too much 2   Feeling tired or having little energy 2   Poor appetite or overeating 2   Feeling bad about yourself - or that you are a failure or have let yourself or your family down 0   Trouble concentrating on things, such as reading the newspaper or watching television 2   Moving or speaking so slowly that other people could have noticed. Or the opposite - being so fidgety or restless that you have been moving around a lot more than usual 0   Thoughts that you would be better off dead, or of hurting yourself in some way 0   Total Score 12   Patient understands the risks associated with this controlled medication, including tolerance and addiction.  Patient also agrees to only obtain this medication from me, and not from a another provider, unless that provider is covering for me in my absence.  Patient also agrees to be compliant in dosing, and not self adjust the dose of medication.  A signed controlled substance agreement is on file, and the patient has received a controlled substance education sheet at this a previous visit.  The patient has also signed a consent for treatment with a controlled substance as per [facility] policy. FILEMON was obtained.    GIL Darling         Return in about 2 weeks (around 2/6/2020).    Patient Instructions   Vitamin D Deficiency  Vitamin D deficiency is when your body does not have enough vitamin D. Vitamin D is important to your body because:  · It helps your body use other minerals.  · It helps to keep your bones strong and healthy.  · It may help to prevent some diseases.  · It helps your heart and other muscles work well.  Not getting enough vitamin D can make your bones soft. It can also cause other health problems.  What are the causes?  This condition may be caused  by:  · Not eating enough foods that contain vitamin D.  · Not getting enough sun.  · Having diseases that make it hard for your body to absorb vitamin D.  · Having a surgery in which a part of the stomach or a part of the small intestine is removed.  · Having kidney disease or liver disease.  What increases the risk?  You are more likely to get this condition if:  · You are older.  · You do not spend much time outdoors.  · You live in a nursing home.  · You have had broken bones.  · You have weak or thin bones (osteoporosis).  · You have a disease or condition that changes how your body absorbs vitamin D.  · You have dark skin.  · You take certain medicines.  · You are overweight or obese.  What are the signs or symptoms?  · In mild cases, there may not be any symptoms. If the condition is very bad, symptoms may include:  ? Bone pain.  ? Muscle pain.  ? Falling often.  ? Broken bones caused by a minor injury.  How is this treated?  Treatment may include taking supplements as told by your doctor. Your doctor will tell you what dose is best for you. Supplements may include:  · Vitamin D.  · Calcium.  Follow these instructions at home:  Eating and drinking    · Eat foods that contain vitamin D, such as:  ? Dairy products, cereals, or juices with added vitamin D. Check the label.  ? Fish, such as salmon or trout.  ? Eggs.  ? Oysters.  ? Mushrooms.  The items listed above may not be a complete list of what you can eat and drink. Contact a dietitian for more options.  General instructions  · Take medicines and supplements only as told by your doctor.  · Get regular, safe exposure to natural sunlight.  · Do not use a tanning bed.  · Maintain a healthy weight. Lose weight if needed.  · Keep all follow-up visits as told by your doctor. This is important.  How is this prevented?  · You can get vitamin D by:  ? Eating foods that naturally contain vitamin D.  ? Eating or drinking products that have vitamin D added to them, such  as cereals, juices, and milk.  ? Taking vitamin D or a multivitamin that contains vitamin D.  ? Being in the sun. Your body makes vitamin D when your skin is exposed to sunlight. Your body changes the sunlight into a form of the vitamin that it can use.  Contact a doctor if:  · Your symptoms do not go away.  · You feel sick to your stomach (nauseous).  · You throw up (vomit).  · You poop less often than normal, or you have trouble pooping (constipation).  Summary  · Vitamin D deficiency is when your body does not have enough vitamin D.  · Vitamin D helps to keep your bones strong and healthy.  · This condition is often treated by taking a supplement.  · Your doctor will tell you what dose is best for you.  This information is not intended to replace advice given to you by your health care provider. Make sure you discuss any questions you have with your health care provider.  Document Released: 12/06/2012 Document Revised: 08/26/2019 Document Reviewed: 08/26/2019  [employer] Interactive Patient Education © 2019 [employer]    Iron-Rich Diet    Iron is a mineral that helps your body to produce hemoglobin. Hemoglobin is a protein in red blood cells that carries oxygen to your body's tissues. Eating too little iron may cause you to feel weak and tired, and it can increase your risk of infection. Iron is naturally found in many foods, and many foods have iron added to them (iron-fortified foods).  You may need to follow an iron-rich diet if you do not have enough iron in your body due to certain medical conditions. The amount of iron that you need each day depends on your age, your sex, and any medical conditions you have. Follow instructions from your health care provider or a diet and nutrition specialist (dietitian) about how much iron you should eat each day.  What are tips for following this plan?  Reading food labels  · Check food labels to see how many milligrams (mg) of iron are in each serving.  Cooking  · Cook  foods in pots and pans that are made from iron.  · Take these steps to make it easier for your body to absorb iron from certain foods:  ? Soak beans overnight before cooking.  ? Soak whole grains overnight and drain them before using.  ? Ferment flours before baking, such as by using yeast in bread dough.  Meal planning  · When you eat foods that contain iron, you should eat them with foods that are high in vitamin C. These include oranges, peppers, tomatoes, potatoes, and [place]. Vitamin C helps your body to absorb iron.  General information  · Take iron supplements only as told by your health care provider. An overdose of iron can be life-threatening. If you were prescribed iron supplements, take them with orange juice or a vitamin C supplement.  · When you eat iron-fortified foods or take an iron supplement, you should also eat foods that naturally contain iron, such as meat, poultry, and fish. Eating naturally iron-rich foods helps your body to absorb the iron that is added to other foods or contained in a supplement.  · Certain foods and drinks prevent your body from absorbing iron properly. Avoid eating these foods in the same meal as iron-rich foods or with iron supplements. These foods include:  ? Coffee, black tea, and red wine.  ? Milk, dairy products, and foods that are high in calcium.  ? Beans and soybeans.  ? Whole grains.  What foods should I eat?  Fruits  Prunes. Raisins.  Eat fruits high in vitamin C, such as oranges, grapefruits, and strawberries, alongside iron-rich foods.  Vegetables  Spinach (cooked). Green peas. Broccoli. Fermented vegetables.  Eat vegetables high in vitamin C, such as leafy greens, potatoes, bell peppers, and tomatoes, alongside iron-rich foods.  Grains  Iron-fortified breakfast cereal. Iron-fortified whole-wheat bread. Enriched rice. Sprouted grains.  Meats and other proteins  Beef liver. Oysters. Beef. Shrimp. Turkey. Chicken. Tuna. Sardines. Chickpeas. Nuts. Tofu. Pumpkin  seeds.  Beverages  Tomato juice. Fresh orange juice. Prune juice. Hibiscus tea. Fortified instant breakfast shakes.  Sweets and desserts  Blackstrap molasses.  Seasonings and condiments  Tahini. Fermented soy sauce.  Other foods  Wheat germ.  The items listed above may not be a complete list of recommended foods and beverages. Contact a dietitian for more information.  What foods should I avoid?  Grains  Whole grains. Bran cereal. Bran flour. Oats.  Meats and other proteins  Soybeans. Products made from soy protein. Black beans. Lentils. Mung beans. Split peas.  Dairy  Milk. Cream. Cheese. Yogurt. Cottage cheese.  Beverages  Coffee. Black tea. Red wine.  Sweets and desserts  Cocoa. Chocolate. Ice cream.  Other foods  Basil. Oregano. Large amounts of parsley.  The items listed above may not be a complete list of foods and beverages to avoid. Contact a dietitian for more information.  Summary  · Iron is a mineral that helps your body to produce hemoglobin. Hemoglobin is a protein in red blood cells that carries oxygen to your body's tissues.  · Iron is naturally found in many foods, and many foods have iron added to them (iron-fortified foods).  · When you eat foods that contain iron, you should eat them with foods that are high in vitamin C. Vitamin C helps your body to absorb iron.  · Certain foods and drinks prevent your body from absorbing iron properly, such as whole grains and dairy products. You should avoid eating these foods in the same meal as iron-rich foods or with iron supplements.  This information is not intended to replace advice given to you by your health care provider. Make sure you discuss any questions you have with your health care provider.  Document Released: 08/01/2006 Document Revised: 11/13/2018 Document Reviewed: 11/13/2018  [employer] Interactive Patient Education © 2019 [employer]    Hyperglycemia  Hyperglycemia is when the sugar (glucose) level in your blood is too high. It may not  cause symptoms. If you do have symptoms, they may include warning signs, such as:  · Feeling more thirsty than normal.  · Hunger.  · Feeling tired.  · Needing to pee (urinate) more than normal.  · Blurry eyesight (vision).  You may get other symptoms as it gets worse, such as:  · Dry mouth.  · Not being hungry (loss of appetite).  · Fruity-smelling breath.  · Weakness.  · Weight gain or loss that is not planned. Weight loss may be fast.  · A tingling or numb feeling in your hands or feet.  · Headache.  · Skin that does not bounce back quickly when it is lightly pinched and released (poor skin turgor).  · Pain in your belly (abdomen).  · Cuts or bruises that heal slowly.  High blood sugar can happen to people who do or do not have diabetes. High blood sugar can happen slowly or quickly, and it can be an emergency.  Follow these instructions at home:  General instructions  · Take over-the-counter and prescription medicines only as told by your doctor.  · Do not use products that contain nicotine or tobacco, such as cigarettes and e-cigarettes. If you need help quitting, ask your doctor.  · Limit alcohol intake to no more than 1 drink per day for nonpregnant women and 2 drinks per day for men. One drink equals 12 oz of beer, 5 oz of wine, or 1½ oz of hard liquor.  · Manage stress. If you need help with this, ask your doctor.  · Keep all follow-up visits as told by your doctor. This is important.  Eating and drinking    · Stay at a healthy weight.  · Exercise regularly, as told by your doctor.  · Drink enough fluid, especially when you:  ? Exercise.  ? Get sick.  ? Are in hot temperatures.  · Eat healthy foods, such as:  ? Low-fat (lean) proteins.  ? Complex carbs (complex carbohydrates), such as whole wheat bread or brown rice.  ? Fresh fruits and vegetables.  ? Low-fat dairy products.  ? Healthy fats.  · Drink enough fluid to keep your pee (urine) clear or pale yellow.  If you have diabetes:    · Make sure you know  the symptoms of hyperglycemia.  · Follow your diabetes management plan, as told by your doctor. Make sure you:  ? Take insulin and medicines as told.  ? Follow your exercise plan.  ? Follow your meal plan. Eat on time. Do not skip meals.  ? Check your blood sugar as often as told. Make sure to check before and after exercise. If you exercise longer or in a different way than you normally do, check your blood sugar more often.  ? Follow your sick day plan whenever you cannot eat or drink normally. Make this plan ahead of time with your doctor.  · Share your diabetes management plan with people in your workplace, school, and household.  · Check your urine for ketones when you are ill and as told by your doctor.  · Carry a card or wear jewelry that says that you have diabetes.  Contact a doctor if:  · Your blood sugar level is higher than 240 mg/dL (13.3 mmol/L) for 2 days in a row.  · You have problems keeping your blood sugar in your target range.  · High blood sugar happens often for you.  Get help right away if:  · You have trouble breathing.  · You have a change in how you think, feel, or act (mental status).  · You feel sick to your stomach (nauseous), and that feeling does not go away.  · You cannot stop throwing up (vomiting).  These symptoms may be an emergency. Do not wait to see if the symptoms will go away. Get medical help right away. Call your local emergency services (911 in the [place] Do not drive yourself to the hospital.  Summary  · Hyperglycemia is when the sugar (glucose) level in your blood is too high.  · High blood sugar can happen to people who do or do not have diabetes.  · Make sure you drink enough fluids, eat healthy foods, and exercise regularly.  · Contact your doctor if you have problems keeping your blood sugar in your target range.  This information is not intended to replace advice given to you by your health care provider. Make sure you discuss any questions you have with your health  care provider.  Document Released: 10/15/2010 Document Revised: 09/04/2017 Document Reviewed: 09/04/2017  [employer] Interactive Patient Education © 2019 [employer]    Chronic Pain, Adult  Chronic pain is a type of pain that lasts or keeps coming back (recurs) for at least six months. You may have chronic headaches, abdominal pain, or body pain. Chronic pain may be related to an illness, such as fibromyalgia or complex regional pain syndrome. Sometimes the cause of chronic pain is not known.  Chronic pain can make it hard for you to do daily activities. If not treated, chronic pain can lead to other health problems, including anxiety and depression. Treatment depends on the cause and severity of your pain. You may need to work with a pain specialist to come up with a treatment plan. The plan may include medicine, counseling, and physical therapy. Many people benefit from a combination of two or more types of treatment to control their pain.  Follow these instructions at home:  Lifestyle  · Consider keeping a pain diary to share with your health care providers.  · Consider talking with a mental health care provider (psychologist) about how to cope with chronic pain.  · Consider joining a chronic pain support group.  · Try to control or lower your stress levels. Talk to your health care provider about strategies to do this.  General instructions    · Take over-the-counter and prescription medicines only as told by your health care provider.  · Follow your treatment plan as told by your health care provider. This may include:  ? Gentle, regular exercise.  ? Eating a healthy diet that includes foods such as vegetables, fruits, fish, and lean meats.  ? Cognitive or behavioral therapy.  ? Working with a physical therapist.  ? Meditation or yoga.  ? Acupuncture or massage therapy.  ? Aroma, color, light, or sound therapy.  ? Local electrical stimulation.  ? Shots (injections) of numbing or pain-relieving medicines into  the spine or the area of pain.  · Check your pain level as told by your health care provider. Ask your health care provider if you should use a pain scale.  · Learn as much as you can about how to manage your chronic pain. Ask your health care provider if an intensive pain rehabilitation program or a chronic pain specialist would be helpful.  · Keep all follow-up visits as told by your health care provider. This is important.  Contact a health care provider if:  · Your pain gets worse.  · You have new pain.  · You have trouble sleeping.  · You have trouble doing your normal activities.  · Your pain is not controlled with treatment.  · Your have side effects from pain medicine.  · You feel weak.  Get help right away if:  · You lose feeling or have numbness in your body.  · You lose control of bowel or bladder function.  · Your pain suddenly gets much worse.  · You develop shaking or chills.  · You develop confusion.  · You develop chest pain.  · You have trouble breathing or shortness of breath.  · You pass out.  · You have thoughts about hurting yourself or others.  This information is not intended to replace advice given to you by your health care provider. Make sure you discuss any questions you have with your health care provider.  Document Released: 09/09/2003 Document Revised: 08/17/2017 Document Reviewed: 06/06/2017  [employer] Patient Education © 2019 [employer]        Lab on 01/16/2020   Component Date Value Ref Range Status   • Hgb Solubility 01/16/2020 Negative  Negative Final   • Hgb F Quant 01/16/2020 0.0  0.0 - 2.0 % Final   • Hgb A 01/16/2020 98.1  96.4 - 98.8 % Final   • Hgb S 01/16/2020 0.0  0.0 % Final   • Hgb C 01/16/2020 0.0  0.0 % Final   • Hgb A2 Quant 01/16/2020 1.9  1.8 - 3.2 % Final   • Hgb Interp. 01/16/2020 Comment   Final    Normal adult hemoglobin present.   Office Visit on 01/16/2020   Component Date Value Ref Range Status   • Ferritin 01/16/2020 9.96* 13.00 - 150.00 ng/mL  Final   • Iron 01/16/2020 34* 37 - 145 mcg/dL Final   • Iron Saturation 01/16/2020 7* 20 - 50 % Final   • Transferrin 01/16/2020 345  200 - 360 mg/dL Final   • TIBC 01/16/2020 514  298 - 536 mcg/dL Final   • Free T4 01/16/2020 1.35  0.93 - 1.70 ng/dL Final   • TSH 01/16/2020 1.720  0.270 - 4.200 uIU/mL Final   • T3, Total 01/16/2020 150.0  80.0 - 200.0 ng/dl Final   • Hemoglobin A1C 01/16/2020 5.70* 4.80 - 5.60 % Final   • Report Summary 01/16/2020 FINAL   Final    Comment: ====================================================================  [facility] 13 [facility]  ====================================================================  Test                             Result       Flag       Units  Drug Present    Hydrocodone                    45                      ng/mg creat    Dihydrocodeine                 27                      ng/mg creat    Norhydrocodone                 63                      ng/mg creat     Sources of hydrocodone include scheduled prescription     medications. Dihydrocodeine and norhydrocodone are expected     metabolites of hydrocodone. Dihydrocodeine is also available as a     scheduled prescription medication.  ====================================================================  Test                      Result    Flag   Units      Ref Range    Creatinine              231              mg/dL      >=20  ====================================================================  Declared Medications:   Medication list was not                            provided.  ====================================================================  For clinical consultation, please call [phone].  ====================================================================   • Ethanol Screen Urine (Ref) 01/16/2020 Negative   Final   • Ethanol, Urine 01/16/2020 Not Detected  g/dL Final   • Amphetamine, Urine Qual 01/16/2020 Negative   Final   • Methamphetamine, Urine 01/16/2020 Not Detected  ng/mg creat  Final   • Amphetamine, Urine 01/16/2020 Not Detected  ng/mg creat Final   • MDMA URINE 01/16/2020 Not Detected  ng/mg creat Final   • MDA 01/16/2020 Not Detected  ng/mg creat Final   • Benzodiazepine Screen, Urine 01/16/2020 Negative   Final   • DIAZEPAM URINE QUANT (REF) 01/16/2020 Not Detected  ng/mg creat Final   • Desmethyldiazepam 01/16/2020 Not Detected  ng/mg creat Final   • Oxazepam, urine 01/16/2020 Not Detected  ng/mg creat Final   • Temazepam, urine 01/16/2020 Not Detected  ng/mg creat Final    Expected metabolism of benzodiazepine class drugs:   Parent Drug       Detected Metabolites   -----------       --------------------   Diazepam:         Desmethyldiazepam, Temazepam, Oxazepam   Chlordiazepoxide: Desmethyldiazepam, Oxazepam   Clorazepate:      Desmethyldiazepam, Oxazepam   Halazepam:        Desmethyldiazepam, Oxazepam   Temazepam:        Oxazepam   Oxazepam:         None   • ALPRAZOLAM 01/16/2020 Not Detected  ng/mg creat Final   • ALPHA-HYDROXYALPRAZOLAM UR, QT (RE* 01/16/2020 Not Detected  ng/mg creat Final   • DESALKLFLURAZEPAM UR QUANT (REF) 01/16/2020 Not Detected  ng/mg creat Final   • LORAZEPAM URINE QUANT (REF) 01/16/2020 Not Detected  ng/mg creat Final   • Alpha-hydroxytriazolam, Urine 01/16/2020 Not Detected  ng/mg creat Final   • Clonazepam Urine 01/16/2020 Not Detected  ng/mg creat Final   • 7-Aminoclonazepam Urine 01/16/2020 Not Detected  ng/mg creat Final   • MIDAZOLAM UR, QUANT (REF) 01/16/2020 Not Detected  ng/mg creat Final   • Alpha-hydroxymidazolam, Urine 01/16/2020 Not Detected  ng/mg creat Final   • Flunitrazepam 01/16/2020 Not Detected  ng/mg creat Final   • DESMETHYLFLUNITRAZEPAM 01/16/2020 Not Detected  ng/mg creat Final   • Cocaine & Metabolite 01/16/2020 Negative   Final   • Cocaine Screen, Urine 01/16/2020 Not Detected  ng/mg creat Final   • Benzoylecgonine, Urine 01/16/2020 Not Detected  ng/mg creat Final   • Cocaethylene Ur 01/16/2020 Not Detected  ng/mg creat Final   •  THC, Urine 01/16/2020 Negative   Final   • Carboxy THC, Urine 01/16/2020 Not Detected  ng/mg creat Final   • 6-ACETYLMORPHINE 01/16/2020 Negative   Final   • 6-acetylmorphine 01/16/2020 Not Detected  ng/mg creat Final   • Opiate Class 01/16/2020 +POSITIVE+   Final   • Codeine, Urine 01/16/2020 Not Detected  ng/mg creat Final   • Morphine, Urine 01/16/2020 Not Detected  ng/mg creat Final   • normorphine 01/16/2020 Not Detected  ng/mg creat Final   • Norcodeine Ur 01/16/2020 Not Detected  ng/mg creat Final   • Hydrocodone UR 01/16/2020 45  ng/mg creat Final   • Hydromorphone Urine 01/16/2020 Not Detected  ng/mg creat Final   • Dihydrocodeine, Urine 01/16/2020 27  ng/mg creat Final   • Opiates, Norhydrocodone, Urine 01/16/2020 63  ng/mg creat Final    Expected metabolism of opiate class drugs:  Parent Drug       Detected Metabolites   -----------       --------------------   Codeine:          Major:  Morphine, Norcodeine                     Minor:  Hydrocodone, Hydromorphone,                             Dihydrocodeine, Norhydrocodone,                             Normorphine   Morphine:         Major:  Normorphine                     Minor:  Hydromorphone   Hydrocodone:      Hydromorphone, Dihydrocodeine,                     Norhydrocodone   Hydromorphone:    None   Dihydrocodeine:   None   Heroin:           6-Acetylmorphine (if included),                     Morphine, Normorphine                     Codeine, in small amounts in comparison                      to morphine, is often detected when                      heroin is the source drug.   • Oxycodone Class Ur 01/16/2020 Negative   Final   • Oxycodone, Confirmation, Urine 01/16/2020 Not Detected  ng/mg creat Final   • Oxymorphone UR 01/16/2020 Not Detected  ng/mg creat Final   • Opiates, Noroxycodone, Urine 01/16/2020 Not Detected  ng/mg creat Final   • Noroxymorphone 01/16/2020 Not Detected  ng/mg creat Final    Expected metabolism of oxycodone class drugs:    Parent Drug       Detected Metabolites   -----------       --------------------   Oxycodone:        Oxymorphone, Noroxycodone, Noroxymorphone   Oxymorphone:      Noroxymorphone   • Methadone 01/16/2020 Negative   Final   • Methadone, Quantitative 01/16/2020 Not Detected  ng/mg creat Final   • EDDP 01/16/2020 Not Detected  ng/mg creat Final   • Fentanyl and Analogues, Ur 01/16/2020 Negative   Final   • Fentanyl, Urine 01/16/2020 Not Detected  ng/mg creat Final   • Norfentanyl Urine 01/16/2020 Not Detected  ng/mg creat Final   • Sufentanil, Ur 01/16/2020 Not Detected  ng/mg creat Final   • Alfentanil, Ur 01/16/2020 Not Detected  ng/mg creat Final   • BUPRENORPHINE 01/16/2020 Negative   Final   • Buprenorphine, Urine 01/16/2020 Not Detected  ng/mg creat Final   • Norbuprenorphine 01/16/2020 Not Detected  ng/mg creat Final   • Tapentadol 01/16/2020 Negative   Final   • Tapentadol Ur 01/16/2020 Not Detected  ng/mg creat Final   • Opoidss other, UR 01/16/2020 Negative   Final   • Tramadol, Urine 01/16/2020 Not Detected  ng/mg creat Final   • O-desmethyltramadol, Ur 01/16/2020 Not Detected  ng/mg creat Final   • N-Desmethyltramadol, U 01/16/2020 Not Detected  ng/mg creat Final   • BARBITURATES, URINE 01/16/2020 Negative   Final   • Amobarbital, Urine 01/16/2020 Not Detected   Final   • Barbital 01/16/2020 Not Detected   Final   • Butabarbital, Ur 01/16/2020 Not Detected   Final   • Butalbital Screen, Urine 01/16/2020 Not Detected   Final   • Mephobarbital Urine 01/16/2020 Not Detected   Final   • Pentobarbital UR 01/16/2020 Not Detected   Final   • Phenobarbital 01/16/2020 Not Detected   Final   • Secobarbital, urine 01/16/2020 Not Detected   Final   • Thiopental, Ur 01/16/2020 Not Detected   Final   • Creatinine, Urine 01/16/2020 231  mg/dL Final    REFERENCE RANGE: Ref Range>=20   • Level of Detection: 01/16/2020 Comment   Final    TESTING THRESHOLDS ARE AS FOLLOWS:  AMPHETAMINES: 50 ng/mL  BENZODIAZEPINES: 20  ng/mL  COCAINE / METABOLITE: 50 ng/mL  ALCOHOL, ETHYL: 0.020 gm/dL  CANNABINOIDS: total-20 ng/mL, carboxy-THC-2 ng/mL  6-ACETYLMORPHINE: 10 ng/mL  OPIATE CLASS: 50 ng/mL/mL  OXYCODONE CLASS: 50 ng/mL  METHADONE: 50 ng/mL/mL  BUPRENORPHINE: buprenorphine-1.0 ng/mL,                 norbuprenorphine-5 ng/mL  FENTANYL / ANALOGUES: fentanyl-1.0 ng/mL, others-5.0 ng/mL  TAPENTADOL: 50 ng/mL  TRAMADOL: 50 ng/mL  BARBITURATES: 200 ng/mL  This test was developed and its performance characteristics  determined by [facility].  It has not been cleared or approved  by the Food and Drug Administration.   • Folate 01/16/2020 9.91  4.78 - 24.20 ng/mL Final   • Vitamin B-12 01/16/2020 587  211 - 946 pg/mL Final   • 25 Hydroxy, Vitamin D 01/16/2020 25.3* 30.0 - 100.0 ng/ml Final   • WBC 01/16/2020 10.19  3.40 - 10.80 10*3/mm3 Final   • RBC 01/16/2020 4.93  3.77 - 5.28 10*6/mm3 Final   • Hemoglobin 01/16/2020 12.1  12.0 - 15.9 g/dL Final   • Hematocrit 01/16/2020 37.9  34.0 - 46.6 % Final   • MCV 01/16/2020 76.9* 79.0 - 97.0 fL Final   • MCH 01/16/2020 24.5* 26.6 - 33.0 pg Final   • MCHC 01/16/2020 31.9  31.5 - 35.7 g/dL Final   • RDW 01/16/2020 16.0* 12.3 - 15.4 % Final   • RDW-SD 01/16/2020 43.6  37.0 - 54.0 fl Final   • MPV 01/16/2020 9.6  6.0 - 12.0 fL Final   • Platelets 01/16/2020 332  140 - 450 10*3/mm3 Final   • Neutrophil % 01/16/2020 60.2  42.7 - 76.0 % Final   • Lymphocyte % 01/16/2020 29.5  19.6 - 45.3 % Final   • Monocyte % 01/16/2020 5.6  5.0 - 12.0 % Final   • Eosinophil % 01/16/2020 3.9  0.3 - 6.2 % Final   • Basophil % 01/16/2020 0.8  0.0 - 1.5 % Final   • Neutrophils, Absolute 01/16/2020 6.13  1.70 - 7.00 10*3/mm3 Final   • Lymphocytes, Absolute 01/16/2020 3.01  0.70 - 3.10 10*3/mm3 Final   • Monocytes, Absolute 01/16/2020 0.57  0.10 - 0.90 10*3/mm3 Final   • Eosinophils, Absolute 01/16/2020 0.40  0.00 - 0.40 10*3/mm3 Final   • Basophils, Absolute 01/16/2020 0.08  0.00 - 0.20 10*3/mm3 Final   ]

## 2020-01-27 LAB
INSULIN FREE SERPL-ACNC: 19 UU/ML
INSULIN SERPL-ACNC: 20 UU/ML

## 2020-01-28 DIAGNOSIS — M54.2 CHRONIC MIDLINE POSTERIOR NECK PAIN: ICD-10-CM

## 2020-01-28 DIAGNOSIS — M54.42 CHRONIC BILATERAL LOW BACK PAIN WITH BILATERAL SCIATICA: ICD-10-CM

## 2020-01-28 DIAGNOSIS — L40.9 PSORIASIS AND SIMILAR DISORDERS: Primary | ICD-10-CM

## 2020-01-28 DIAGNOSIS — M54.41 CHRONIC BILATERAL LOW BACK PAIN WITH BILATERAL SCIATICA: ICD-10-CM

## 2020-01-28 DIAGNOSIS — M50.30 DDD (DEGENERATIVE DISC DISEASE), CERVICAL: ICD-10-CM

## 2020-01-28 DIAGNOSIS — G89.29 CHRONIC BILATERAL LOW BACK PAIN WITH BILATERAL SCIATICA: ICD-10-CM

## 2020-01-28 DIAGNOSIS — M51.34 DDD (DEGENERATIVE DISC DISEASE), THORACIC: ICD-10-CM

## 2020-01-28 DIAGNOSIS — G89.29 CHRONIC MIDLINE POSTERIOR NECK PAIN: ICD-10-CM

## 2020-01-28 RX ORDER — CALCIPOTRIENE 50 UG/G
CREAM TOPICAL 2 TIMES DAILY
Qty: 60 G | Refills: 5 | Status: SHIPPED | OUTPATIENT
Start: 2020-01-28 | End: 2021-11-23

## 2020-01-28 RX ORDER — HYDROCODONE BITARTRATE AND ACETAMINOPHEN 5; 325 MG/1; MG/1
1 TABLET ORAL 2 TIMES DAILY PRN
Qty: 14 TABLET | Refills: 0 | Status: SHIPPED | OUTPATIENT
Start: 2020-01-28 | End: 2020-02-10 | Stop reason: DRUGHIGH

## 2020-02-10 ENCOUNTER — OFFICE VISIT (OUTPATIENT)
Dept: FAMILY MEDICINE CLINIC | Facility: CLINIC | Age: 30
End: 2020-02-10

## 2020-02-10 VITALS
WEIGHT: 229 LBS | OXYGEN SATURATION: 99 % | DIASTOLIC BLOOD PRESSURE: 76 MMHG | BODY MASS INDEX: 42.14 KG/M2 | HEIGHT: 62 IN | RESPIRATION RATE: 18 BRPM | TEMPERATURE: 97.9 F | SYSTOLIC BLOOD PRESSURE: 100 MMHG | HEART RATE: 88 BPM

## 2020-02-10 DIAGNOSIS — M54.2 CHRONIC MIDLINE POSTERIOR NECK PAIN: ICD-10-CM

## 2020-02-10 DIAGNOSIS — G89.29 CHRONIC BILATERAL LOW BACK PAIN WITH BILATERAL SCIATICA: Primary | ICD-10-CM

## 2020-02-10 DIAGNOSIS — M54.42 CHRONIC BILATERAL LOW BACK PAIN WITH BILATERAL SCIATICA: Primary | ICD-10-CM

## 2020-02-10 DIAGNOSIS — G89.29 CHRONIC MIDLINE POSTERIOR NECK PAIN: ICD-10-CM

## 2020-02-10 DIAGNOSIS — M54.41 CHRONIC BILATERAL LOW BACK PAIN WITH BILATERAL SCIATICA: Primary | ICD-10-CM

## 2020-02-10 PROCEDURE — 99214 OFFICE O/P EST MOD 30 MIN: CPT | Performed by: NURSE PRACTITIONER

## 2020-02-10 RX ORDER — HYDROCODONE BITARTRATE AND ACETAMINOPHEN 7.5; 325 MG/1; MG/1
1 TABLET ORAL EVERY 6 HOURS PRN
Qty: 60 TABLET | Refills: 0 | Status: SHIPPED | OUTPATIENT
Start: 2020-02-10 | End: 2020-03-09 | Stop reason: SDUPTHER

## 2020-02-10 RX ORDER — NAPROXEN 500 MG/1
500 TABLET ORAL 2 TIMES DAILY PRN
Qty: 30 TABLET | Refills: 5 | Status: SHIPPED | OUTPATIENT
Start: 2020-02-10 | End: 2020-03-09 | Stop reason: SDUPTHER

## 2020-02-10 NOTE — PROGRESS NOTES
Chief Complaint   Patient presents with   • Follow-up     2 wk     Subjective   Marizol WATKINS is a 29 y.o. female who presents to the office for 2 week follow up of chronic pain requiring hydrocodone and to review labs of 1/16/2020.    The following portions of the patient's history were reviewed and updated as appropriate: allergies, current medications, past family history, past medical history, past social history, past surgical history and problem list.    History of Present Illness   1/16/2020 UDS appropriate  Vit D 25.3  Insulin free 19 todal 20  Iron 34, saturation 7, ferritin 9.97    Transferrin normal  TIBC normal  A1C 5.7%  B12, folate normal  CBC MCV 76.9 MCH 24.5 RDW 16.0  Hbg electrophoresis normal    Iron deficiency iron and iron stores are low. Information given for iron rich diet and iron supplement ordered at last visit, reports tolerating supplementation without any adverse effects  Vitamin D deficiency new diagnosis, taking vitamin D supplement no problems  Prediabetes level glucose new diagnosis will begin metformin reports no adverse effects.    UDS abnormal, positive for hydrocodone without a prescription. According to results this could occur as metabolites of tylenol #3 which she does admit to having taken one or two of in past few weeks, and was prescribed legally last year.         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     Chronic neck pain:  She has been experiencing increasing pain posterior neck over spine for past year. Cervical spine xray 3/8/19 from [facility] shows mild DDD with some spurs at C5-6. Loss of cervical lordosis. Rates pain at 3/10. May go as high as 8/10, which makes her feel like crying. Previously reported only reducing to a 4/10 with rest, much improved with PRN meds.  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 Sometimes ice and heat application will help.  She has  "been seeing a chiropractor since November with mild to moderate improvement noted lasting for a day or two and it returns to previous levels.     Chronic bilateral lower back pain with intermittent bilateral sciatica, usually on right. Onset over a year ago. Currently rates at 3/10. May get as high as 8/10 with bending, long term standing or walking, lifting, long term sitting. Resting improves the pain, and most likely to be lowest pain when has been asleep a while, however sometimes when she wakes up it is also bad and there is much stiffness.   Reports improved ability to manage ADL s and work responsibilities \"a little\", though stats \"done help much\".  States it helps for about 5-6 hours but has only been taking 1 per day.  Reports is allergic to tramadol, cant take that.  She was not strongly impressed with pain relief of lortab 5mg nor with mobic and hasnt used much of either.        Past Medical History:   Diagnosis Date   • Acute bronchitis    • Anxiety    • Bipolar disease, chronic (CMS/HCC)    • Cough    • Itch of skin    • Mild depression (CMS/HCC)    • Visit for gynecologic examination           Family History   Problem Relation Age of Onset   • No Known Problems Mother    • Diabetes Father    • Heart disease Father    • Hypertension Maternal Grandmother    • Breast cancer Paternal Grandmother    • Colon cancer Neg Hx    • Endometrial cancer Neg Hx    • Ovarian cancer Neg Hx         Review of Systems   Constitutional: Negative.  Negative for fatigue, fever and unexpected weight change.        Always feel \"tired\"   HENT: Negative.    Eyes: Negative.    Respiratory: Negative for cough, chest tightness and shortness of breath.    Cardiovascular: Negative.  Negative for chest pain.   Gastrointestinal: Negative.    Endocrine: Negative.    Genitourinary: Negative.  Negative for dysuria.   Musculoskeletal: Positive for arthralgias, back pain and neck pain.        Neck and shoulder pain a \"4\" like a dull ache at " "times \"sharp\". Lower mid back and between shoulders with intermittent sciatica  Using Ibuprofen for pain prn   Skin: Negative.  Negative for color change, pallor, rash and wound.   Allergic/Immunologic: Negative.    Neurological: Negative.    Hematological: Negative.    Psychiatric/Behavioral: Negative.  Negative for sleep disturbance and suicidal ideas.   All other systems reviewed and are negative.      Objective   Vitals:    02/10/20 1139   BP: 100/76   BP Location: Left arm   Patient Position: Sitting   Cuff Size: Adult   Pulse: 88   Resp: 18   Temp: 97.9 °F (36.6 °C)   TempSrc: Tympanic   SpO2: 99%   Weight: 104 kg (229 lb)   Height: 157.5 cm (62\")   PainSc:   3   PainLoc: Back     Physical Exam   Constitutional: She is oriented to person, place, and time. She appears well-developed and well-nourished.   HENT:   Head: Normocephalic and atraumatic.   Eyes: Pupils are equal, round, and reactive to light. Conjunctivae are normal.   Neck: Normal range of motion. Neck supple.   Cardiovascular: Normal rate, regular rhythm, normal heart sounds and intact distal pulses. Exam reveals no gallop and no friction rub.   No murmur heard.  Pulmonary/Chest: Effort normal and breath sounds normal. No respiratory distress. She has no wheezes. She has no rales. She exhibits no tenderness.   Abdominal: Soft. Bowel sounds are normal.   Musculoskeletal: Normal range of motion. She exhibits no edema, tenderness or deformity.        Lumbar back: She exhibits pain.        Back:    Lymphadenopathy:     She has no cervical adenopathy.   Neurological: She is alert and oriented to person, place, and time.   Skin: Skin is warm and dry. Capillary refill takes 2 to 3 seconds. No erythema. No pallor.   Psychiatric: She has a normal mood and affect. Her behavior is normal. Judgment and thought content normal.   Nursing note and vitals reviewed.      Assessment/Plan   Marizol was seen today for follow-up.    Diagnoses and all orders for this " visit:    Chronic bilateral low back pain with bilateral sciatica  -     naproxen (NAPROSYN) 500 MG tablet; Take 1 tablet by mouth 2 (Two) Times a Day As Needed for Mild Pain  (back pain, menstrual pain).  -     Ambulatory Referral to Physical Therapy Evaluate and treat  -     HYDROcodone-acetaminophen (NORCO) 7.5-325 MG per tablet; Take 1 tablet by mouth Every 6 (Six) Hours As Needed for Moderate Pain .    Chronic midline posterior neck pain  -     naproxen (NAPROSYN) 500 MG tablet; Take 1 tablet by mouth 2 (Two) Times a Day As Needed for Mild Pain  (back pain, menstrual pain).  -     Ambulatory Referral to Physical Therapy Evaluate and treat  -     HYDROcodone-acetaminophen (NORCO) 7.5-325 MG per tablet; Take 1 tablet by mouth Every 6 (Six) Hours As Needed for Moderate Pain .           PHQ-2/PHQ-9 Depression Screening 1/16/2020   Little interest or pleasure in doing things 2   Feeling down, depressed, or hopeless 2   Trouble falling or staying asleep, or sleeping too much 2   Feeling tired or having little energy 2   Poor appetite or overeating 2   Feeling bad about yourself - or that you are a failure or have let yourself or your family down 0   Trouble concentrating on things, such as reading the newspaper or watching television 2   Moving or speaking so slowly that other people could have noticed. Or the opposite - being so fidgety or restless that you have been moving around a lot more than usual 0   Thoughts that you would be better off dead, or of hurting yourself in some way 0   Total Score 12   Patient understands the risks associated with this controlled medication, including tolerance and addiction.  Patient also agrees to only obtain this medication from me, and not from a another provider, unless that provider is covering for me in my absence.  Patient also agrees to be compliant in dosing, and not self adjust the dose of medication.  A signed controlled substance agreement is on file, and the patient  has received a controlled substance education sheet at this a previous visit.  The patient has also signed a consent for treatment with a controlled substance as per [facility] policy. FILEMON was obtained.    GIL Darling         Return in about 4 weeks (around 3/9/2020).    There are no Patient Instructions on file for this visit.    Lab on 01/16/2020   Component Date Value Ref Range Status   • Hgb Solubility 01/16/2020 Negative  Negative Final   • Hgb F Quant 01/16/2020 0.0  0.0 - 2.0 % Final   • Hgb A 01/16/2020 98.1  96.4 - 98.8 % Final   • Hgb S 01/16/2020 0.0  0.0 % Final   • Hgb C 01/16/2020 0.0  0.0 % Final   • Hgb A2 Quant 01/16/2020 1.9  1.8 - 3.2 % Final   • Hgb Interp. 01/16/2020 Comment   Final    Normal adult hemoglobin present.   Office Visit on 01/16/2020   Component Date Value Ref Range Status   • Ferritin 01/16/2020 9.96* 13.00 - 150.00 ng/mL Final   • Iron 01/16/2020 34* 37 - 145 mcg/dL Final   • Iron Saturation 01/16/2020 7* 20 - 50 % Final   • Transferrin 01/16/2020 345  200 - 360 mg/dL Final   • TIBC 01/16/2020 514  298 - 536 mcg/dL Final   • Free T4 01/16/2020 1.35  0.93 - 1.70 ng/dL Final   • TSH 01/16/2020 1.720  0.270 - 4.200 uIU/mL Final   • T3, Total 01/16/2020 150.0  80.0 - 200.0 ng/dl Final   • Hemoglobin A1C 01/16/2020 5.70* 4.80 - 5.60 % Final   • Report Summary 01/16/2020 FINAL   Final    Comment: ====================================================================  TOXASSURE SELECT 13 KRISTY-DIS  ====================================================================  Test                             Result       Flag       Units  Drug Present    Hydrocodone                    45                      ng/mg creat    Dihydrocodeine                 27                      ng/mg creat    Norhydrocodone                 63                      ng/mg creat     Sources of hydrocodone include scheduled prescription     medications. Dihydrocodeine and norhydrocodone are expected      metabolites of hydrocodone. Dihydrocodeine is also available as a     scheduled prescription medication.  ====================================================================  Test                      Result    Flag   Units      Ref Range    Creatinine              231              mg/dL      >=20  ====================================================================  Declared Medications:   Medication list was not                            provided.  ====================================================================  For clinical consultation, please call [phone].  ====================================================================   • Ethanol Screen Urine (Ref) 01/16/2020 Negative   Final   • Ethanol, Urine 01/16/2020 Not Detected  g/dL Final   • Amphetamine, Urine Qual 01/16/2020 Negative   Final   • Methamphetamine, Urine 01/16/2020 Not Detected  ng/mg creat Final   • Amphetamine, Urine 01/16/2020 Not Detected  ng/mg creat Final   • MDMA URINE 01/16/2020 Not Detected  ng/mg creat Final   • MDA 01/16/2020 Not Detected  ng/mg creat Final   • Benzodiazepine Screen, Urine 01/16/2020 Negative   Final   • DIAZEPAM URINE QUANT (REF) 01/16/2020 Not Detected  ng/mg creat Final   • Desmethyldiazepam 01/16/2020 Not Detected  ng/mg creat Final   • Oxazepam, urine 01/16/2020 Not Detected  ng/mg creat Final   • Temazepam, urine 01/16/2020 Not Detected  ng/mg creat Final    Expected metabolism of benzodiazepine class drugs:   Parent Drug       Detected Metabolites   -----------       --------------------   Diazepam:         Desmethyldiazepam, Temazepam, Oxazepam   Chlordiazepoxide: Desmethyldiazepam, Oxazepam   Clorazepate:      Desmethyldiazepam, Oxazepam   Halazepam:        Desmethyldiazepam, Oxazepam   Temazepam:        Oxazepam   Oxazepam:         None   • ALPRAZOLAM 01/16/2020 Not Detected  ng/mg creat Final   • ALPHA-HYDROXYALPRAZOLAM UR, QT (RE* 01/16/2020 Not Detected  ng/mg creat Final   • DESALKLFLURAZEPAM  UR QUANT (REF) 01/16/2020 Not Detected  ng/mg creat Final   • LORAZEPAM URINE QUANT (REF) 01/16/2020 Not Detected  ng/mg creat Final   • Alpha-hydroxytriazolam, Urine 01/16/2020 Not Detected  ng/mg creat Final   • Clonazepam Urine 01/16/2020 Not Detected  ng/mg creat Final   • 7-Aminoclonazepam Urine 01/16/2020 Not Detected  ng/mg creat Final   • MIDAZOLAM UR, QUANT (REF) 01/16/2020 Not Detected  ng/mg creat Final   • Alpha-hydroxymidazolam, Urine 01/16/2020 Not Detected  ng/mg creat Final   • Flunitrazepam 01/16/2020 Not Detected  ng/mg creat Final   • DESMETHYLFLUNITRAZEPAM 01/16/2020 Not Detected  ng/mg creat Final   • Cocaine & Metabolite 01/16/2020 Negative   Final   • Cocaine Screen, Urine 01/16/2020 Not Detected  ng/mg creat Final   • Benzoylecgonine, Urine 01/16/2020 Not Detected  ng/mg creat Final   • Cocaethylene Ur 01/16/2020 Not Detected  ng/mg creat Final   • THC, Urine 01/16/2020 Negative   Final   • Carboxy THC, Urine 01/16/2020 Not Detected  ng/mg creat Final   • 6-ACETYLMORPHINE 01/16/2020 Negative   Final   • 6-acetylmorphine 01/16/2020 Not Detected  ng/mg creat Final   • Opiate Class 01/16/2020 +POSITIVE+   Final   • Codeine, Urine 01/16/2020 Not Detected  ng/mg creat Final   • Morphine, Urine 01/16/2020 Not Detected  ng/mg creat Final   • normorphine 01/16/2020 Not Detected  ng/mg creat Final   • Norcodeine Ur 01/16/2020 Not Detected  ng/mg creat Final   • Hydrocodone UR 01/16/2020 45  ng/mg creat Final   • Hydromorphone Urine 01/16/2020 Not Detected  ng/mg creat Final   • Dihydrocodeine, Urine 01/16/2020 27  ng/mg creat Final   • Opiates, Norhydrocodone, Urine 01/16/2020 63  ng/mg creat Final    Expected metabolism of opiate class drugs:  Parent Drug       Detected Metabolites   -----------       --------------------   Codeine:          Major:  Morphine, Norcodeine                     Minor:  Hydrocodone, Hydromorphone,                             Dihydrocodeine, Norhydrocodone,                              Normorphine   Morphine:         Major:  Normorphine                     Minor:  Hydromorphone   Hydrocodone:      Hydromorphone, Dihydrocodeine,                     Norhydrocodone   Hydromorphone:    None   Dihydrocodeine:   None   Heroin:           6-Acetylmorphine (if included),                     Morphine, Normorphine                     Codeine, in small amounts in comparison                      to morphine, is often detected when                      heroin is the source drug.   • Oxycodone Class Ur 01/16/2020 Negative   Final   • Oxycodone, Confirmation, Urine 01/16/2020 Not Detected  ng/mg creat Final   • Oxymorphone UR 01/16/2020 Not Detected  ng/mg creat Final   • Opiates, Noroxycodone, Urine 01/16/2020 Not Detected  ng/mg creat Final   • Noroxymorphone 01/16/2020 Not Detected  ng/mg creat Final    Expected metabolism of oxycodone class drugs:   Parent Drug       Detected Metabolites   -----------       --------------------   Oxycodone:        Oxymorphone, Noroxycodone, Noroxymorphone   Oxymorphone:      Noroxymorphone   • Methadone 01/16/2020 Negative   Final   • Methadone, Quantitative 01/16/2020 Not Detected  ng/mg creat Final   • EDDP 01/16/2020 Not Detected  ng/mg creat Final   • Fentanyl and Analogues, Ur 01/16/2020 Negative   Final   • Fentanyl, Urine 01/16/2020 Not Detected  ng/mg creat Final   • Norfentanyl Urine 01/16/2020 Not Detected  ng/mg creat Final   • Sufentanil, Ur 01/16/2020 Not Detected  ng/mg creat Final   • Alfentanil, Ur 01/16/2020 Not Detected  ng/mg creat Final   • BUPRENORPHINE 01/16/2020 Negative   Final   • Buprenorphine, Urine 01/16/2020 Not Detected  ng/mg creat Final   • Norbuprenorphine 01/16/2020 Not Detected  ng/mg creat Final   • Tapentadol 01/16/2020 Negative   Final   • Tapentadol Ur 01/16/2020 Not Detected  ng/mg creat Final   • Opoidss other, UR 01/16/2020 Negative   Final   • Tramadol, Urine 01/16/2020 Not Detected  ng/mg creat Final   •  O-desmethyltramadol, Ur 01/16/2020 Not Detected  ng/mg creat Final   • N-Desmethyltramadol, U 01/16/2020 Not Detected  ng/mg creat Final   • BARBITURATES, URINE 01/16/2020 Negative   Final   • Amobarbital, Urine 01/16/2020 Not Detected   Final   • Barbital 01/16/2020 Not Detected   Final   • Butabarbital, Ur 01/16/2020 Not Detected   Final   • Butalbital Screen, Urine 01/16/2020 Not Detected   Final   • Mephobarbital Urine 01/16/2020 Not Detected   Final   • Pentobarbital UR 01/16/2020 Not Detected   Final   • Phenobarbital 01/16/2020 Not Detected   Final   • Secobarbital, urine 01/16/2020 Not Detected   Final   • Thiopental, Ur 01/16/2020 Not Detected   Final   • Creatinine, Urine 01/16/2020 231  mg/dL Final    REFERENCE RANGE: Ref Range>=20   • Level of Detection: 01/16/2020 Comment   Final    TESTING THRESHOLDS ARE AS FOLLOWS:  AMPHETAMINES: 50 ng/mL  BENZODIAZEPINES: 20 ng/mL  COCAINE / METABOLITE: 50 ng/mL  ALCOHOL, ETHYL: 0.020 gm/dL  CANNABINOIDS: total-20 ng/mL, carboxy-THC-2 ng/mL  6-ACETYLMORPHINE: 10 ng/mL  OPIATE CLASS: 50 ng/mL/mL  OXYCODONE CLASS: 50 ng/mL  METHADONE: 50 ng/mL/mL  BUPRENORPHINE: buprenorphine-1.0 ng/mL,                 norbuprenorphine-5 ng/mL  FENTANYL / ANALOGUES: fentanyl-1.0 ng/mL, others-5.0 ng/mL  TAPENTADOL: 50 ng/mL  TRAMADOL: 50 ng/mL  BARBITURATES: 200 ng/mL  This test was developed and its performance characteristics  determined by [employer].  It has not been cleared or approved  by the Food and Drug Administration.   • Folate 01/16/2020 9.91  4.78 - 24.20 ng/mL Final   • Vitamin B-12 01/16/2020 587  211 - 946 pg/mL Final   • 25 Hydroxy, Vitamin D 01/16/2020 25.3* 30.0 - 100.0 ng/ml Final   • Insulin, Free 01/16/2020 19* uU/mL Final    Reference Range:  Pubertal Children and  Adults (fasting): 0 - 17   • Insulin 01/16/2020 20  uU/mL Final    Non-Diabetic:  In the absence of insulin-binding antibodies,  the free and total insulin assays are equivalent. However,  this assay is  intended for use in diabetics with insulin  autoantibody present. Measurement is performed on  acid-treated samples and, therefore, the sensitivity and  absolute values by this method may differ from our direct  insulin ICMA.  Insulin Dependent Diabetic Patients:  Free Insulin levels  vary depending on the capacity and affinity of circulating  insulin-binding antibodies and the dose of insulin given to  the patient.  Total insulin levels represent free insulin  and antibody bound insulin fractions.   • WBC 01/16/2020 10.19  3.40 - 10.80 10*3/mm3 Final   • RBC 01/16/2020 4.93  3.77 - 5.28 10*6/mm3 Final   • Hemoglobin 01/16/2020 12.1  12.0 - 15.9 g/dL Final   • Hematocrit 01/16/2020 37.9  34.0 - 46.6 % Final   • MCV 01/16/2020 76.9* 79.0 - 97.0 fL Final   • MCH 01/16/2020 24.5* 26.6 - 33.0 pg Final   • MCHC 01/16/2020 31.9  31.5 - 35.7 g/dL Final   • RDW 01/16/2020 16.0* 12.3 - 15.4 % Final   • RDW-SD 01/16/2020 43.6  37.0 - 54.0 fl Final   • MPV 01/16/2020 9.6  6.0 - 12.0 fL Final   • Platelets 01/16/2020 332  140 - 450 10*3/mm3 Final   • Neutrophil % 01/16/2020 60.2  42.7 - 76.0 % Final   • Lymphocyte % 01/16/2020 29.5  19.6 - 45.3 % Final   • Monocyte % 01/16/2020 5.6  5.0 - 12.0 % Final   • Eosinophil % 01/16/2020 3.9  0.3 - 6.2 % Final   • Basophil % 01/16/2020 0.8  0.0 - 1.5 % Final   • Neutrophils, Absolute 01/16/2020 6.13  1.70 - 7.00 10*3/mm3 Final   • Lymphocytes, Absolute 01/16/2020 3.01  0.70 - 3.10 10*3/mm3 Final   • Monocytes, Absolute 01/16/2020 0.57  0.10 - 0.90 10*3/mm3 Final   • Eosinophils, Absolute 01/16/2020 0.40  0.00 - 0.40 10*3/mm3 Final   • Basophils, Absolute 01/16/2020 0.08  0.00 - 0.20 10*3/mm3 Final   ]

## 2020-03-09 ENCOUNTER — OFFICE VISIT (OUTPATIENT)
Dept: FAMILY MEDICINE CLINIC | Facility: CLINIC | Age: 30
End: 2020-03-09

## 2020-03-09 VITALS
SYSTOLIC BLOOD PRESSURE: 130 MMHG | BODY MASS INDEX: 42.29 KG/M2 | DIASTOLIC BLOOD PRESSURE: 70 MMHG | TEMPERATURE: 98.3 F | WEIGHT: 229.8 LBS | HEIGHT: 62 IN | RESPIRATION RATE: 16 BRPM | OXYGEN SATURATION: 99 % | HEART RATE: 82 BPM

## 2020-03-09 DIAGNOSIS — G89.29 CHRONIC MIDLINE POSTERIOR NECK PAIN: ICD-10-CM

## 2020-03-09 DIAGNOSIS — M54.2 CHRONIC MIDLINE POSTERIOR NECK PAIN: ICD-10-CM

## 2020-03-09 DIAGNOSIS — J45.20 MILD INTERMITTENT ASTHMA WITHOUT COMPLICATION: ICD-10-CM

## 2020-03-09 DIAGNOSIS — G89.29 CHRONIC BILATERAL LOW BACK PAIN WITH BILATERAL SCIATICA: ICD-10-CM

## 2020-03-09 DIAGNOSIS — Z51.81 ENCOUNTER FOR THERAPEUTIC DRUG LEVEL MONITORING: Primary | ICD-10-CM

## 2020-03-09 DIAGNOSIS — M54.42 CHRONIC BILATERAL LOW BACK PAIN WITH BILATERAL SCIATICA: ICD-10-CM

## 2020-03-09 DIAGNOSIS — M54.41 CHRONIC BILATERAL LOW BACK PAIN WITH BILATERAL SCIATICA: ICD-10-CM

## 2020-03-09 PROCEDURE — 99214 OFFICE O/P EST MOD 30 MIN: CPT | Performed by: NURSE PRACTITIONER

## 2020-03-09 RX ORDER — HYDROCODONE BITARTRATE AND ACETAMINOPHEN 7.5; 325 MG/1; MG/1
1 TABLET ORAL 2 TIMES DAILY PRN
Qty: 45 TABLET | Refills: 0 | Status: SHIPPED | OUTPATIENT
Start: 2020-03-09 | End: 2020-04-14 | Stop reason: SDUPTHER

## 2020-03-09 RX ORDER — ALBUTEROL SULFATE 90 UG/1
2 AEROSOL, METERED RESPIRATORY (INHALATION) EVERY 4 HOURS PRN
Qty: 1 INHALER | Refills: 11 | Status: SHIPPED | OUTPATIENT
Start: 2020-03-09 | End: 2021-05-03 | Stop reason: SDUPTHER

## 2020-03-09 RX ORDER — NAPROXEN 500 MG/1
500 TABLET ORAL 2 TIMES DAILY PRN
Qty: 60 TABLET | Refills: 5 | Status: SHIPPED | OUTPATIENT
Start: 2020-03-09 | End: 2021-05-03 | Stop reason: SDUPTHER

## 2020-03-09 NOTE — PROGRESS NOTES
Chief Complaint   Patient presents with   • Follow-up     1 month  neck and back pain     Subjective   Marizol WATKINS is a 29 y.o. female who presents to the office for 1 month follow up of chronic neck and lower back pain.     The following portions of the patient's history were reviewed and updated as appropriate: allergies, current medications, past family history, past medical history, past social history, past surgical history and problem list.    History of Present Illness   1/16/2020 UDS appropriate  Vit D 25.3  Insulin free 19 todal 20  Iron 34, saturation 7, ferritin 9.97    Transferrin normal  TIBC normal  A1C 5.7%  B12, folate normal  CBC MCV 76.9 MCH 24.5 RDW 16.0  Hbg electrophoresis normal        UDS abnormal, positive for hydrocodone without a prescription. According to results this could occur as metabolites of tylenol #3 which she does admit to having taken one or two of in past few weeks, and was prescribed legally last year.         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     Chronic neck pain:  She has been experiencing increasing pain posterior neck over spine for past year. Cervical spine xray 3/8/19 from [facility] shows mild DDD with some spurs at C5-6. Loss of cervical lordosis. Rates pain at 3/10. May go as high as 8/10, which makes her feel like crying. Previously reported only reducing to a 4/10 with rest, much improved with PRN meds.  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 Sometimes ice and heat application will help.  She has been seeing a chiropractor since November with mild to moderate improvement noted lasting for a day or two and it returns to previous levels.     Chronic bilateral lower back pain with intermittent bilateral sciatica, usually on right. Onset over a year ago. Currently rates at 3/10. May get as high as 8/10 with bending, long term standing or walking, lifting,  "long term sitting. Resting improves the pain, and most likely to be lowest pain when has been asleep a while, however sometimes when she wakes up it is also bad and there is much stiffness.   Reports improved ability to manage ADL s and work responsibilities \"a little\", though stats \"done help much\".  States it helps for about 5-6 hours but has only been taking 1 per day.  Reports is allergic to tramadol, cant take that.  At last visit on 3/9/2020 She reported was not strongly impressed with pain relief of lortab 5mg nor with mobic and had not used much of either. She was switched at her request from Mobic back to Naproxen and a refill of hydrocodone issued. She was also referred to PT, which is the more likely treatment to bring about relief at her age.   She reports has not started therapy yet. The original order was sent in error to [place]. Will resent PT order and decrease available to #45 and frequency to only when necessary and not more often than BID PRN pain.  IMPRESSION:  CONCLUSION:  Normal lumbar spine.  Mild degenerative changes lower thoracic spine.  1.2 cm right renal calculus.  4 mm calcification to the right of the L5 transverse process,  somewhat lateral and ventral to be a ureteral calculus, perhaps a  small calcified mesenteric lymph node.     [id number]     Electronically signed by:  Jethro Vasquez MD  1/17/2020 2:59 PM CST  Workstation: [id number]    New problems today: needs refills on albuterol inhaler for mild intermittent asthma    HPI, ROS  and PE from most recent  Visit carried forward and updated as appropriate for current situation.    Past Medical History:   Diagnosis Date   • Acute bronchitis    • Anxiety    • Bipolar disease, chronic (CMS/HCC)    • Cough    • Itch of skin    • Mild depression (CMS/HCC)    • Visit for gynecologic examination           Family History   Problem Relation Age of Onset   • No Known Problems Mother    • Diabetes Father    • Heart disease Father    • " "Hypertension Maternal Grandmother    • Breast cancer Paternal Grandmother    • Colon cancer Neg Hx    • Endometrial cancer Neg Hx    • Ovarian cancer Neg Hx         Review of Systems   Constitutional: Negative.  Negative for fatigue, fever and unexpected weight change.        Always feel \"tired\"   HENT: Negative.    Eyes: Negative.    Respiratory: Negative.  Negative for cough, chest tightness and shortness of breath.    Cardiovascular: Negative.  Negative for chest pain.   Gastrointestinal: Negative.    Endocrine: Negative.    Genitourinary: Negative.  Negative for dysuria.   Musculoskeletal: Positive for arthralgias, back pain and neck pain.        Neck and shoulder pain a \"4\" like a dull ache at times \"sharp\". Lower mid back and between shoulders with intermittent sciatica  Using Ibuprofen for pain prn   Skin: Negative.  Negative for color change, pallor, rash and wound.   Allergic/Immunologic: Negative.    Neurological: Negative.    Hematological: Negative.    Psychiatric/Behavioral: Negative.  Negative for sleep disturbance and suicidal ideas.   All other systems reviewed and are negative.      Objective   Vitals:    03/09/20 1320   BP: 130/70   BP Location: Left arm   Patient Position: Sitting   Cuff Size: Adult   Pulse: 82   Resp: 16   Temp: 98.3 °F (36.8 °C)   TempSrc: Tympanic   SpO2: 99%   Weight: 104 kg (229 lb 12.8 oz)   Height: 157.5 cm (62\")   PainSc:   3   PainLoc: Back  Comment: and neck     Physical Exam   Constitutional: She is oriented to person, place, and time. She appears well-developed and well-nourished.   HENT:   Head: Normocephalic and atraumatic.   Eyes: Pupils are equal, round, and reactive to light. Conjunctivae are normal.   Neck: Normal range of motion. Neck supple.   Cardiovascular: Normal rate, regular rhythm, normal heart sounds and intact distal pulses. Exam reveals no gallop and no friction rub.   No murmur heard.  Pulmonary/Chest: Effort normal and breath sounds normal. No " respiratory distress. She has no wheezes. She has no rales. She exhibits no tenderness.   Abdominal: Soft. Bowel sounds are normal.   Musculoskeletal: Normal range of motion. She exhibits no edema, tenderness or deformity.        Lumbar back: She exhibits pain.        Back:    Lymphadenopathy:     She has no cervical adenopathy.   Neurological: She is alert and oriented to person, place, and time.   Skin: Skin is warm and dry. Capillary refill takes 2 to 3 seconds. No erythema. No pallor.   Psychiatric: She has a normal mood and affect. Her behavior is normal. Judgment and thought content normal.   Nursing note and vitals reviewed.      Assessment/Plan   Marizol was seen today for follow-up.    Diagnoses and all orders for this visit:    Encounter for therapeutic drug level monitoring  -     ToxASSURE Select 13 Discrete -    Chronic bilateral low back pain with bilateral sciatica  -     naproxen (NAPROSYN) 500 MG tablet; Take 1 tablet by mouth 2 (Two) Times a Day As Needed for Mild Pain  (back pain, menstrual pain).  -     HYDROcodone-acetaminophen (NORCO) 7.5-325 MG per tablet; Take 1 tablet by mouth 2 (Two) Times a Day As Needed for Moderate Pain  (only when necessary).  -     Ambulatory Referral to Physical Therapy Evaluate and treat; (eval and treat); Full weight bearing    Chronic midline posterior neck pain  -     naproxen (NAPROSYN) 500 MG tablet; Take 1 tablet by mouth 2 (Two) Times a Day As Needed for Mild Pain  (back pain, menstrual pain).  -     HYDROcodone-acetaminophen (NORCO) 7.5-325 MG per tablet; Take 1 tablet by mouth 2 (Two) Times a Day As Needed for Moderate Pain  (only when necessary).  -     Ambulatory Referral to Physical Therapy Evaluate and treat; (eval and treat); Full weight bearing  -     XR Spine Cervical 2 or 3 View    Mild intermittent asthma without complication  -     albuterol sulfate  (90 Base) MCG/ACT inhaler; Inhale 2 puffs Every 4 (Four) Hours As Needed for Wheezing or  Shortness of Air.         Will contract for hydrocodone today with understanding we will be decreasing dose each month until off. Encouraged that PT will provide best improvement in pain and ROM.   PHQ-2/PHQ-9 Depression Screening 3/9/2020   Little interest or pleasure in doing things 0   Feeling down, depressed, or hopeless 0   Trouble falling or staying asleep, or sleeping too much 0   Feeling tired or having little energy 0   Poor appetite or overeating 0   Feeling bad about yourself - or that you are a failure or have let yourself or your family down 0   Trouble concentrating on things, such as reading the newspaper or watching television 0   Moving or speaking so slowly that other people could have noticed. Or the opposite - being so fidgety or restless that you have been moving around a lot more than usual 0   Thoughts that you would be better off dead, or of hurting yourself in some way 0   Total Score 0   Patient understands the risks associated with this controlled medication, including tolerance and addiction.  Patient also agrees to only obtain this medication from me, and not from a another provider, unless that provider is covering for me in my absence.  Patient also agrees to be compliant in dosing, and not self adjust the dose of medication.  A signed controlled substance agreement is on file, and the patient has received a controlled substance education sheet at this a previous visit.  The patient has also signed a consent for treatment with a controlled substance as per [facility] policy. FILEMON was obtained.      GIL Darling         Return in about 4 weeks (around 4/6/2020).    There are no Patient Instructions on file for this visit.

## 2020-04-09 ENCOUNTER — TELEMEDICINE (OUTPATIENT)
Dept: FAMILY MEDICINE CLINIC | Facility: CLINIC | Age: 30
End: 2020-04-09

## 2020-04-09 DIAGNOSIS — R50.9 FEVER IN ADULT: ICD-10-CM

## 2020-04-09 DIAGNOSIS — R52 GENERALIZED BODY ACHES: ICD-10-CM

## 2020-04-09 DIAGNOSIS — B34.9 NONSPECIFIC SYNDROME SUGGESTIVE OF VIRAL ILLNESS: Primary | ICD-10-CM

## 2020-04-09 PROCEDURE — 99213 OFFICE O/P EST LOW 20 MIN: CPT | Performed by: NURSE PRACTITIONER

## 2020-04-09 RX ORDER — OSELTAMIVIR PHOSPHATE 75 MG/1
75 CAPSULE ORAL 2 TIMES DAILY
Qty: 10 CAPSULE | Refills: 0 | Status: SHIPPED | OUTPATIENT
Start: 2020-04-09 | End: 2020-04-14

## 2020-04-09 NOTE — PATIENT INSTRUCTIONS
Begin Tamiflu today  Call me if you have not heard from covid testing center for appointment by 4 pm today.  You will be scheduled for covid testing at [facility] testing site today.  Self quarantine for 6 days minimum or until covid negative and without fever for 24 hours without fever reducing meds, and off tamiflu and without fever with same criteria. If positive you will home quarantine as directed until public health states you are cleared.How to Quarantine at Home  Information for Patients and Families    These instructions are for people with confirmed or suspected COVID-19 who do not need to be hospitalized and those with confirmed COVID-19 who were hospitalized and discharged to care for themselves at home.    If you were tested through the Health Department  The Health Department will monitor your wellbeing.  If it is determined that you do not need to be hospitalized and can be isolated at home, you will be monitored by staff from your local or state health department.     If you were tested through a Commercial Lab  You will need to monitor yourself and report changes in your symptoms to your doctor.  See the section below called Monitor Your Symptoms.    Follow these steps until a healthcare provider or local or state health department says you can return to your normal activities.    Stay home except to get medical care  • Restrict activities outside your home, except for getting medical care.   • Do not go to work, school, or public areas.   • Avoid using public transportation, ride-sharing, or taxis.    Separate yourself from other people and animals in your home  People  As much as possible, you should stay in a specific room and away from other people in your home. Also, you should use a separate bathroom, if available.    Animals  You should restrict contact with pets and other animals while you are sick with COVID-19, just like you would around other people. When possible, have another member of your  household care for your animals while you are sick. If you are sick with COVID-19, avoid contact with your pet, including petting, snuggling, being kissed or licked, and sharing food. If you must care for your pet or be around animals while you are sick, wash your hands before and after you interact with pets and wear a facemask. See COVID-19 and Animals for more information.    Call ahead before visiting your doctor  If you have a medical appointment, call the healthcare provider and tell them that you have or may have COVID-19. This information will help the healthcare provider’s office take steps to keep other people from getting infected or exposed.    Wear a facemask  You should wear a facemask when you are around other people (e.g., sharing a room or vehicle) or pets and before you enter a healthcare provider’s office.     If you are not able to wear a facemask (for example, because it causes trouble breathing), then people who live with you should not stay in the same room with you, or they should wear a facemask if they enter your room.    Cover your coughs and sneezes  • Cover your mouth and nose with a tissue when you cough or sneeze.   • Throw used tissues in a lined trash can.   • Immediately wash your hands with soap and water for at least 20 seconds or, if soap and water are not available, clean your hands with an alcohol-based hand  that contains at least 60% alcohol.    Clean your hands often  • Wash your hands often with soap and water for at least 20 seconds, especially after blowing your nose, coughing, or sneezing; going to the bathroom; and before eating or preparing food.     • If soap and water are not readily available, use an alcohol-based hand  with at least 60% alcohol, covering all surfaces of your hands and rubbing them together until they feel dry.    • Soap and water are the best option if hands are visibly dirty. Avoid touching your eyes, nose, and mouth with  unwashed hands.    Avoid sharing personal household items  • You should not share dishes, drinking glasses, cups, eating utensils, towels, or bedding with other people or pets in your home.   • After using these items, they should be washed thoroughly with soap and water.    Clean all “high-touch” surfaces everyday  • High touch surfaces include counters, tabletops, doorknobs, bathroom fixtures, toilets, phones, keyboards, tablets, and bedside tables.   • Also, clean any surfaces that may have blood, stool, or body fluids on them.   • Use a household cleaning spray or wipe, according to the label instructions. Labels contain instructions for safe and effective use of the cleaning product, including precautions you should take when applying the product, such as wearing gloves and making sure you have good ventilation during use of the product.    Monitor your symptoms  • Seek prompt medical attention if your illness is worsening (e.g., difficulty breathing).   • Before seeking care, call your healthcare provider and tell them that you have, or are being evaluated for, COVID-19.   • Put on a facemask before you enter the facility.     • These steps will help the healthcare provider’s office to keep other people in the office or waiting room from getting infected or exposed.   • Persons who are placed under active monitoring or facilitated self-monitoring should follow instructions provided by their local health department or occupational health professionals, as appropriate.  • If you have a medical emergency and need to call 911, notify the dispatch personnel that you have, or are being evaluated for COVID-19. If possible, put on a facemask before emergency medical services arrive.    Discontinuing home isolation  Patients with confirmed COVID-19 should remain under home isolation precautions until the risk of secondary transmission to others is thought to be low. The decision to discontinue home isolation  precautions should be made on a case-by-case basis, in consultation with healthcare providers and state and local health departments.    The below content are for household members, intimate partners, and caregivers of a patient with symptomatic laboratory-confirmed COVID-19 or a patient under investigation:    Household members, intimate partners, and caregivers may have close contact with a person with symptomatic, laboratory-confirmed COVID-19 or a person under investigation.     Close contacts should monitor their health; they should call their healthcare provider right away if they develop symptoms suggestive of COVID-19 (e.g., fever, cough, shortness of breath)     Close contacts should also follow these recommendations:  • Make sure that you understand and can help the patient follow their healthcare provider’s instructions for medication(s) and care. You should help the patient with basic needs in the home and provide support for getting groceries, prescriptions, and other personal needs.  • Monitor the patient’s symptoms. If the patient is getting sicker, call his or her healthcare provider and tell them that the patient has laboratory-confirmed COVID-19. This will help the healthcare provider’s office take steps to keep other people in the office or waiting room from getting infected. Ask the healthcare provider to call the local or state health department for additional guidance. If the patient has a medical emergency and you need to call 911, notify the dispatch personnel that the patient has, or is being evaluated for COVID-19.  • Household members should stay in another room or be  from the patient as much as possible. Household members should use a separate bedroom and bathroom, if available.  • Prohibit visitors who do not have an essential need to be in the home.  • Household members should care for any pets in the home. Do not handle pets or other animals while sick.  For more information,  see COVID-19 and Animals.  • Make sure that shared spaces in the home have good air flow, such as by an air conditioner or an opened window, weather permitting.  • Perform hand hygiene frequently. Wash your hands often with soap and water for at least 20 seconds or use an alcohol-based hand  that contains 60 to 95% alcohol, covering all surfaces of your hands and rubbing them together until they feel dry. Soap and water should be used preferentially if hands are visibly dirty.  • Avoid touching your eyes, nose, and mouth with unwashed hands.  • The patient should wear a facemask when you are around other people. If the patient is not able to wear a facemask (for example, because it causes trouble breathing), you, as the caregiver, should wear a mask when you are in the same room as the patient.  • Wear a disposable facemask and gloves when you touch or have contact with the patient’s blood, stool, or body fluids, such as saliva, sputum, nasal mucus, vomit, or urine.   o Throw out disposable facemasks and gloves after using them. Do not reuse.  o When removing personal protective equipment, first remove and dispose of gloves. Then, immediately clean your hands with soap and water or alcohol-based hand . Next, remove and dispose of facemask, and immediately clean your hands again with soap and water or alcohol-based hand .  • Avoid sharing household items with the patient. You should not share dishes, drinking glasses, cups, eating utensils, towels, bedding, or other items. After the patient uses these items, you should wash them thoroughly (see below “Wash laundry thoroughly”).  • Clean all “high-touch” surfaces, such as counters, tabletops, doorknobs, bathroom fixtures, toilets, phones, keyboards, tablets, and bedside tables, every day. Also, clean any surfaces that may have blood, stool, or body fluids on them.   o Use a household cleaning spray or wipe, according to the label  instructions. Labels contain instructions for safe and effective use of the cleaning product including precautions you should take when applying the product, such as wearing gloves and making sure you have good ventilation during use of the product.  • Wash laundry thoroughly.   o Immediately remove and wash clothes or bedding that have blood, stool, or body fluids on them.  o Wear disposable gloves while handling soiled items and keep soiled items away from your body. Clean your hands (with soap and water or an alcohol-based hand ) immediately after removing your gloves.  o Read and follow directions on labels of laundry or clothing items and detergent. In general, using a normal laundry detergent according to washing machine instructions and dry thoroughly using the warmest temperatures recommended on the clothing label.  • Place all used disposable gloves, facemasks, and other contaminated items in a lined container before disposing of them with other household waste. Clean your hands (with soap and water or an alcohol-based hand ) immediately after handling these items. Soap and water should be used preferentially if hands are visibly dirty.  • Discuss any additional questions with your state or local health department or healthcare provider.    Adapted from information provided by the Centers for Disease Control and Prevention.  For more information, visit https://www.cdc.gov/coronavirus/2019-ncov/hcp/guidance-prevent-spread.html

## 2020-04-09 NOTE — PROGRESS NOTES
Chief Complaint   Patient presents with   • Fever   • Nausea   • Generalized Body Aches     Subjective   Marizol WATKINS is a 29 y.o. female who presents to the office by video visit due to pandemic, for chronic pain requiring hydrocodone. Also with new fever and body aches, nausea yesterday, denies SOB, cough or other symptoms.   HPI: treated for acute URI 3/16/20 with azithromycin and steroid, felt better until yesterday and these symptoms began, but without the respiratory component now.     The following portions of the patient's history were reviewed and updated as appropriate: allergies, current medications, past family history, past medical history, past social history, past surgical history and problem list.    History of Present Illness   You have chosen to receive care through a telehealth visit.  Do you consent to use a video/audio connection for your medical care today? Yes  This was an audio and video enabled telemedicine encounter.    URI nausea onset yesterday, no cough, body aches all over, crying due to increased pain.   Reports temp of 101F yesterday, continues with fever today.  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    No other complaints today.    Past Medical History:   Diagnosis Date   • Acute bronchitis    • Anxiety    • Bipolar disease, chronic (CMS/HCC)    • Cough    • Itch of skin    • Mild depression (CMS/HCC)    • Visit for gynecologic examination           Family History   Problem Relation Age of Onset   • No Known Problems Mother    • Diabetes Father    • Heart disease Father    • Hypertension Maternal Grandmother    • Breast cancer Paternal Grandmother    • Colon cancer Neg Hx    • Endometrial cancer Neg Hx    • Ovarian cancer Neg Hx         Review of Systems   Constitutional: Positive for activity change, chills and fever. Negative for unexpected weight change.   HENT: Negative.    Eyes: Negative.    Respiratory: Negative.  Negative for  cough, chest tightness and shortness of breath.    Cardiovascular: Negative.  Negative for chest pain.   Gastrointestinal: Positive for nausea.   Endocrine: Negative.    Genitourinary: Negative.  Negative for dysuria.   Musculoskeletal: Positive for myalgias.   Skin: Negative.  Negative for color change, pallor, rash and wound.   Allergic/Immunologic: Negative.    Neurological: Negative.    Hematological: Negative.    Psychiatric/Behavioral: Negative.  Negative for sleep disturbance and suicidal ideas.   All other systems reviewed and are negative.      Objective   There were no vitals filed for this visit.  Physical Exam   Constitutional: She is oriented to person, place, and time. She appears well-developed and well-nourished. No distress.   Appears ill   HENT:   Head: Normocephalic and atraumatic.   Eyes: Pupils are equal, round, and reactive to light. Conjunctivae and EOM are normal. Right eye exhibits no discharge. Left eye exhibits no discharge.   Neck: Normal range of motion. No tracheal deviation present.   Pulmonary/Chest: Effort normal. No stridor. No respiratory distress.   Musculoskeletal: Normal range of motion. She exhibits no edema, tenderness or deformity.   Neurological: She is alert and oriented to person, place, and time.   Skin: No rash noted. She is diaphoretic. No erythema. No pallor.   Psychiatric: She has a normal mood and affect. Her behavior is normal. Judgment and thought content normal.       Assessment/Plan   Marizol was seen today for fever, nausea and generalized body aches.    Diagnoses and all orders for this visit:    Nonspecific syndrome suggestive of viral illness  -     Discontinue: oseltamivir (Tamiflu) 75 MG capsule; Take 1 capsule by mouth 2 (Two) Times a Day for 5 days.  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 Given self quarantine information.   PHQ-2/PHQ-9 Depression Screening 3/9/2020   Little interest or pleasure in doing things 0   Feeling down, depressed, or hopeless 0   Trouble falling or staying asleep, or sleeping too much 0   Feeling tired or having little energy 0   Poor appetite or overeating 0   Feeling bad about yourself - or that you are a failure or have let yourself or your family down 0   Trouble concentrating on things, such as reading the newspaper or watching television 0   Moving or speaking so slowly that other people could have noticed. Or the opposite - being so fidgety or restless that you have been moving around a lot more than usual 0   Thoughts that you would be better off dead, or of hurting yourself in some way 0   Total Score 0       Crystal L Gordon, APRN         Return in about 5 days (around 4/14/2020), or if symptoms worsen or fail to improve, for follow up viral illness, rule out covid.    Patient Instructions   Begin Tamiflu today  Call me if you have not heard from covid testing center for appointment by 4 pm today.  You will be scheduled for covid testing at [facility] testing site today.  Self quarantine for 6 days minimum or until covid negative and without fever for 24 hours without fever reducing meds, and off tamiflu and without fever with same criteria. If positive you will home quarantine as directed until public health states you are cleared.How to Quarantine at Home  Information for Patients and Families    These instructions are for people with confirmed or suspected COVID-19 who do not need to be hospitalized and those with confirmed COVID-19 who were hospitalized and discharged to care for themselves at home.    If you were tested through the Health Department  The Health Department will monitor your wellbeing.  If it is determined that you do not need to be hospitalized and can be isolated at home, you  will be monitored by staff from your local or state health department.     If you were tested through a Commercial Lab  You will need to monitor yourself and report changes in your symptoms to your doctor.  See the section below called Monitor Your Symptoms.    Follow these steps until a healthcare provider or local or state health department says you can return to your normal activities.    Stay home except to get medical care  • Restrict activities outside your home, except for getting medical care.   • Do not go to work, school, or public areas.   • Avoid using public transportation, ride-sharing, or taxis.    Separate yourself from other people and animals in your home  People  As much as possible, you should stay in a specific room and away from other people in your home. Also, you should use a separate bathroom, if available.    Animals  You should restrict contact with pets and other animals while you are sick with COVID-19, just like you would around other people. When possible, have another member of your household care for your animals while you are sick. If you are sick with COVID-19, avoid contact with your pet, including petting, snuggling, being kissed or licked, and sharing food. If you must care for your pet or be around animals while you are sick, wash your hands before and after you interact with pets and wear a facemask. See COVID-19 and Animals for more information.    Call ahead before visiting your doctor  If you have a medical appointment, call the healthcare provider and tell them that you have or may have COVID-19. This information will help the healthcare provider’s office take steps to keep other people from getting infected or exposed.    Wear a facemask  You should wear a facemask when you are around other people (e.g., sharing a room or vehicle) or pets and before you enter a healthcare provider’s office.     If you are not able to wear a facemask (for example, because it causes trouble  breathing), then people who live with you should not stay in the same room with you, or they should wear a facemask if they enter your room.    Cover your coughs and sneezes  • Cover your mouth and nose with a tissue when you cough or sneeze.   • Throw used tissues in a lined trash can.   • Immediately wash your hands with soap and water for at least 20 seconds or, if soap and water are not available, clean your hands with an alcohol-based hand  that contains at least 60% alcohol.    Clean your hands often  • Wash your hands often with soap and water for at least 20 seconds, especially after blowing your nose, coughing, or sneezing; going to the bathroom; and before eating or preparing food.     • If soap and water are not readily available, use an alcohol-based hand  with at least 60% alcohol, covering all surfaces of your hands and rubbing them together until they feel dry.    • Soap and water are the best option if hands are visibly dirty. Avoid touching your eyes, nose, and mouth with unwashed hands.    Avoid sharing personal household items  • You should not share dishes, drinking glasses, cups, eating utensils, towels, or bedding with other people or pets in your home.   • After using these items, they should be washed thoroughly with soap and water.    Clean all “high-touch” surfaces everyday  • High touch surfaces include counters, tabletops, doorknobs, bathroom fixtures, toilets, phones, keyboards, tablets, and bedside tables.   • Also, clean any surfaces that may have blood, stool, or body fluids on them.   • Use a household cleaning spray or wipe, according to the label instructions. Labels contain instructions for safe and effective use of the cleaning product, including precautions you should take when applying the product, such as wearing gloves and making sure you have good ventilation during use of the product.    Monitor your symptoms  • Seek prompt medical attention if your  illness is worsening (e.g., difficulty breathing).   • Before seeking care, call your healthcare provider and tell them that you have, or are being evaluated for, COVID-19.   • Put on a facemask before you enter the facility.     • These steps will help the healthcare provider’s office to keep other people in the office or waiting room from getting infected or exposed.   • Persons who are placed under active monitoring or facilitated self-monitoring should follow instructions provided by their local health department or occupational health professionals, as appropriate.  • If you have a medical emergency and need to call 911, notify the dispatch personnel that you have, or are being evaluated for COVID-19. If possible, put on a facemask before emergency medical services arrive.    Discontinuing home isolation  Patients with confirmed COVID-19 should remain under home isolation precautions until the risk of secondary transmission to others is thought to be low. The decision to discontinue home isolation precautions should be made on a case-by-case basis, in consultation with healthcare providers and state and local health departments.    The below content are for household members, intimate partners, and caregivers of a patient with symptomatic laboratory-confirmed COVID-19 or a patient under investigation:    Household members, intimate partners, and caregivers may have close contact with a person with symptomatic, laboratory-confirmed COVID-19 or a person under investigation.     Close contacts should monitor their health; they should call their healthcare provider right away if they develop symptoms suggestive of COVID-19 (e.g., fever, cough, shortness of breath)     Close contacts should also follow these recommendations:  • Make sure that you understand and can help the patient follow their healthcare provider’s instructions for medication(s) and care. You should help the patient with basic needs in the home and  provide support for getting groceries, prescriptions, and other personal needs.  • Monitor the patient’s symptoms. If the patient is getting sicker, call his or her healthcare provider and tell them that the patient has laboratory-confirmed COVID-19. This will help the healthcare provider’s office take steps to keep other people in the office or waiting room from getting infected. Ask the healthcare provider to call the local or [facility] health department for additional guidance. If the patient has a medical emergency and you need to call 911, notify the dispatch personnel that the patient has, or is being evaluated for COVID-19.  • Household members should stay in another room or be  from the patient as much as possible. Household members should use a separate bedroom and bathroom, if available.  • Prohibit visitors who do not have an essential need to be in the home.  • Household members should care for any pets in the home. Do not handle pets or other animals while sick.  For more information, see COVID-19 and Animals.  • Make sure that shared spaces in the home have good air flow, such as by an air conditioner or an opened window, weather permitting.  • Perform hand hygiene frequently. Wash your hands often with soap and water for at least 20 seconds or use an alcohol-based hand  that contains 60 to 95% alcohol, covering all surfaces of your hands and rubbing them together until they feel dry. Soap and water should be used preferentially if hands are visibly dirty.  • Avoid touching your eyes, nose, and mouth with unwashed hands.  • The patient should wear a facemask when you are around other people. If the patient is not able to wear a facemask (for example, because it causes trouble breathing), you, as the caregiver, should wear a mask when you are in the same room as the patient.  • Wear a disposable facemask and gloves when you touch or have contact with the patient’s blood, stool, or body  fluids, such as saliva, sputum, nasal mucus, vomit, or urine.   o Throw out disposable facemasks and gloves after using them. Do not reuse.  o When removing personal protective equipment, first remove and dispose of gloves. Then, immediately clean your hands with soap and water or alcohol-based hand . Next, remove and dispose of facemask, and immediately clean your hands again with soap and water or alcohol-based hand .  • Avoid sharing household items with the patient. You should not share dishes, drinking glasses, cups, eating utensils, towels, bedding, or other items. After the patient uses these items, you should wash them thoroughly (see below “Wash laundry thoroughly”).  • Clean all “high-touch” surfaces, such as counters, tabletops, doorknobs, bathroom fixtures, toilets, phones, keyboards, tablets, and bedside tables, every day. Also, clean any surfaces that may have blood, stool, or body fluids on them.   o Use a household cleaning spray or wipe, according to the label instructions. Labels contain instructions for safe and effective use of the cleaning product including precautions you should take when applying the product, such as wearing gloves and making sure you have good ventilation during use of the product.  • Wash laundry thoroughly.   o Immediately remove and wash clothes or bedding that have blood, stool, or body fluids on them.  o Wear disposable gloves while handling soiled items and keep soiled items away from your body. Clean your hands (with soap and water or an alcohol-based hand ) immediately after removing your gloves.  o Read and follow directions on labels of laundry or clothing items and detergent. In general, using a normal laundry detergent according to washing machine instructions and dry thoroughly using the warmest temperatures recommended on the clothing label.  • Place all used disposable gloves, facemasks, and other contaminated items in a lined  container before disposing of them with other household waste. Clean your hands (with soap and water or an alcohol-based hand ) immediately after handling these items. Soap and water should be used preferentially if hands are visibly dirty.  • Discuss any additional questions with your state or local health department or healthcare provider.    Adapted from information provided by the Centers for Disease Control and Prevention.  For more information, visit https://www.cdc.gov/coronavirus/2019-ncov/hcp/guidance-prevent-spread.html

## 2020-04-14 ENCOUNTER — TELEMEDICINE (OUTPATIENT)
Dept: FAMILY MEDICINE CLINIC | Facility: CLINIC | Age: 30
End: 2020-04-14

## 2020-04-14 DIAGNOSIS — G89.29 CHRONIC MIDLINE POSTERIOR NECK PAIN: ICD-10-CM

## 2020-04-14 DIAGNOSIS — M54.2 CHRONIC MIDLINE POSTERIOR NECK PAIN: ICD-10-CM

## 2020-04-14 DIAGNOSIS — M54.42 CHRONIC BILATERAL LOW BACK PAIN WITH BILATERAL SCIATICA: ICD-10-CM

## 2020-04-14 DIAGNOSIS — G89.29 CHRONIC BILATERAL LOW BACK PAIN WITH BILATERAL SCIATICA: ICD-10-CM

## 2020-04-14 DIAGNOSIS — M54.41 CHRONIC BILATERAL LOW BACK PAIN WITH BILATERAL SCIATICA: ICD-10-CM

## 2020-04-14 PROCEDURE — 99213 OFFICE O/P EST LOW 20 MIN: CPT | Performed by: NURSE PRACTITIONER

## 2020-04-14 RX ORDER — HYDROCODONE BITARTRATE AND ACETAMINOPHEN 7.5; 325 MG/1; MG/1
1 TABLET ORAL 2 TIMES DAILY PRN
Qty: 45 TABLET | Refills: 0 | Status: SHIPPED | OUTPATIENT
Start: 2020-04-14 | End: 2020-04-14 | Stop reason: SDUPTHER

## 2020-04-14 RX ORDER — HYDROCODONE BITARTRATE AND ACETAMINOPHEN 7.5; 325 MG/1; MG/1
1 TABLET ORAL 2 TIMES DAILY PRN
Qty: 45 TABLET | Refills: 0 | Status: SHIPPED | OUTPATIENT
Start: 2020-04-14 | End: 2020-05-15 | Stop reason: SDUPTHER

## 2020-04-22 DIAGNOSIS — R73.03 PREDIABETES: ICD-10-CM

## 2020-04-22 DIAGNOSIS — E61.1 IRON DEFICIENCY: ICD-10-CM

## 2020-04-22 RX ORDER — METFORMIN HYDROCHLORIDE 500 MG/1
1000 TABLET, EXTENDED RELEASE ORAL
Qty: 30 TABLET | Refills: 5 | Status: SHIPPED | OUTPATIENT
Start: 2020-04-22 | End: 2020-08-10 | Stop reason: RX

## 2020-04-22 RX ORDER — LANOLIN ALCOHOL/MO/W.PET/CERES
325 CREAM (GRAM) TOPICAL
Qty: 90 TABLET | Refills: 0 | Status: SHIPPED | OUTPATIENT
Start: 2020-04-22 | End: 2021-02-15 | Stop reason: SDUPTHER

## 2020-05-15 ENCOUNTER — TELEMEDICINE (OUTPATIENT)
Dept: FAMILY MEDICINE CLINIC | Facility: CLINIC | Age: 30
End: 2020-05-15

## 2020-05-15 VITALS — HEIGHT: 62 IN | WEIGHT: 238 LBS | BODY MASS INDEX: 43.79 KG/M2

## 2020-05-15 DIAGNOSIS — D50.9 MICROCYTIC ANEMIA: ICD-10-CM

## 2020-05-15 DIAGNOSIS — E61.1 IRON DEFICIENCY: ICD-10-CM

## 2020-05-15 DIAGNOSIS — E66.01 CLASS 3 SEVERE OBESITY DUE TO EXCESS CALORIES WITHOUT SERIOUS COMORBIDITY WITH BODY MASS INDEX (BMI) OF 40.0 TO 44.9 IN ADULT (HCC): ICD-10-CM

## 2020-05-15 DIAGNOSIS — E55.9 VITAMIN D DEFICIENCY: ICD-10-CM

## 2020-05-15 DIAGNOSIS — R73.9 HYPERGLYCEMIA: ICD-10-CM

## 2020-05-15 DIAGNOSIS — G89.29 CHRONIC BILATERAL LOW BACK PAIN WITH BILATERAL SCIATICA: ICD-10-CM

## 2020-05-15 DIAGNOSIS — Z13.29 SCREENING FOR THYROID DISORDER: ICD-10-CM

## 2020-05-15 DIAGNOSIS — Z51.81 ENCOUNTER FOR THERAPEUTIC DRUG LEVEL MONITORING: ICD-10-CM

## 2020-05-15 DIAGNOSIS — G89.29 CHRONIC MIDLINE POSTERIOR NECK PAIN: ICD-10-CM

## 2020-05-15 DIAGNOSIS — M54.42 CHRONIC BILATERAL LOW BACK PAIN WITH BILATERAL SCIATICA: ICD-10-CM

## 2020-05-15 DIAGNOSIS — M54.41 CHRONIC BILATERAL LOW BACK PAIN WITH BILATERAL SCIATICA: ICD-10-CM

## 2020-05-15 DIAGNOSIS — M54.2 CHRONIC MIDLINE POSTERIOR NECK PAIN: ICD-10-CM

## 2020-05-15 DIAGNOSIS — K21.9 GASTROESOPHAGEAL REFLUX DISEASE, ESOPHAGITIS PRESENCE NOT SPECIFIED: ICD-10-CM

## 2020-05-15 DIAGNOSIS — R11.0 NAUSEA: Primary | ICD-10-CM

## 2020-05-15 PROCEDURE — 99214 OFFICE O/P EST MOD 30 MIN: CPT | Performed by: NURSE PRACTITIONER

## 2020-05-15 RX ORDER — HYDROCODONE BITARTRATE AND ACETAMINOPHEN 7.5; 325 MG/1; MG/1
1 TABLET ORAL 2 TIMES DAILY PRN
Qty: 45 TABLET | Refills: 0 | Status: SHIPPED | OUTPATIENT
Start: 2020-05-15 | End: 2020-06-16 | Stop reason: SDUPTHER

## 2020-05-15 RX ORDER — ONDANSETRON 4 MG/1
4 TABLET, FILM COATED ORAL EVERY 8 HOURS PRN
Qty: 60 TABLET | Refills: 3 | Status: SHIPPED | OUTPATIENT
Start: 2020-05-15 | End: 2021-03-25 | Stop reason: SDUPTHER

## 2020-05-15 RX ORDER — TOPIRAMATE 25 MG/1
25 TABLET ORAL 2 TIMES DAILY
Qty: 60 TABLET | Refills: 5 | Status: SHIPPED | OUTPATIENT
Start: 2020-05-15 | End: 2020-07-14 | Stop reason: SINTOL

## 2020-05-15 RX ORDER — FAMOTIDINE 40 MG/1
40 TABLET, FILM COATED ORAL DAILY
Qty: 90 TABLET | Refills: 1 | Status: SHIPPED | OUTPATIENT
Start: 2020-05-15 | End: 2021-01-04 | Stop reason: SDUPTHER

## 2020-05-15 NOTE — PROGRESS NOTES
Chief Complaint   Patient presents with   • Weight Loss   • Back Pain   • Heartburn     Subjective   Marizol WATKINS is a 29 y.o. female who presents to the office by video visit due to pandemic for follow up of chronic pain, GERD and weight loss    The following portions of the patient's history were reviewed and updated as appropriate: allergies, current medications, past family history, past medical history, past social history, past surgical history and problem list.    History of Present Illness   You have chosen to receive care through a telehealth visit.  Do you consent to use a video/audio connection for your medical care today? Yes  This was an audio and video enabled telemedicine encounter.     patient weight recorded from her own report today.  Body mass index is 43.53 kg/m².  Desires something to help her lose weight with goal of healthier weight.  Agrees to try topirimate.   Has tried and failed to count/ limit carbs, calories, exercise.    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  Reports pain 5/10 today after med and also after significant lifting and bending.  Feels well controlled with current medications.     GERD worsening, takes protonix in am but later in the day it seems to wear off and acid reflux returns with associated nausea/ no vomiting.  Requests Rx for Zofran today.  Agreeable to add famotidine daily.    No other complaints today but does request refills of vitamin D.    Parts of most recent relevant visit HPI, ROS  and PE may be carried forward and all are updated as appropriate for current situation.    Past Medical History:   Diagnosis Date   • Acute bronchitis    • Anxiety    • Bipolar disease, chronic (CMS/HCC)    • Cough    • Itch of skin    • Mild depression (CMS/HCC)    • Visit for gynecologic examination           Family History   Problem Relation Age of Onset   • No Known Problems Mother    • Diabetes Father    • Heart disease Father   "  • Hypertension Maternal Grandmother    • Breast cancer Paternal Grandmother    • Colon cancer Neg Hx    • Endometrial cancer Neg Hx    • Ovarian cancer Neg Hx         Review of Systems   Constitutional:        Over weight   Gastrointestinal: Positive for nausea.   Musculoskeletal: Positive for back pain.   All other systems reviewed and are negative.      Objective   Vitals:    05/15/20 1139   Weight: 108 kg (238 lb)   Height: 157.5 cm (62\")   PainSc:   5   PainLoc: Back     Physical Exam   Constitutional: She is oriented to person, place, and time. She appears well-developed and well-nourished. No distress.   Central obesity   HENT:   Head: Normocephalic and atraumatic.   Eyes: Pupils are equal, round, and reactive to light. Conjunctivae and EOM are normal. Right eye exhibits no discharge. Left eye exhibits no discharge. No scleral icterus.   Neck: Normal range of motion. Neck supple. No tracheal deviation present.   Pulmonary/Chest: Effort normal. No respiratory distress. She has no wheezes.   Musculoskeletal: Normal range of motion. She exhibits no edema, tenderness or deformity.   Neurological: She is alert and oriented to person, place, and time. No cranial nerve deficit.   Skin: Skin is dry. No rash noted. She is not diaphoretic. No erythema. No pallor.   Psychiatric: She has a normal mood and affect. Her behavior is normal. Thought content normal.       Assessment/Plan   Marizol was seen today for weight loss, back pain and heartburn.    Diagnoses and all orders for this visit:    Nausea  -     ondansetron (ZOFRAN) 4 MG tablet; Take 1 tablet by mouth Every 8 (Eight) Hours As Needed for Nausea or Vomiting.    Gastroesophageal reflux disease, esophagitis presence not specified  -     famotidine (PEPCID) 40 MG tablet; Take 1 tablet by mouth Daily.    Chronic bilateral low back pain with bilateral sciatica  -     HYDROcodone-acetaminophen (NORCO) 7.5-325 MG per tablet; Take 1 tablet by mouth 2 (Two) Times a Day As " Needed for Moderate Pain  (only when necessary).    Chronic midline posterior neck pain  -     HYDROcodone-acetaminophen (NORCO) 7.5-325 MG per tablet; Take 1 tablet by mouth 2 (Two) Times a Day As Needed for Moderate Pain  (only when necessary).    Vitamin D deficiency  -     cholecalciferol (VITAMIN D3) 1.25 MG (15723 UT) capsule; Take 1 capsule by mouth Every 7 (Seven) Days.  -     Vitamin D 25 Hydroxy; Future    Microcytic anemia  -     CBC & Differential; Future  -     Vitamin B12 & Folate; Future    Iron deficiency  -     CBC & Differential; Future    Hyperglycemia  -     Comprehensive Metabolic Panel; Future  -     Hemoglobin A1c; Future    Screening for thyroid disorder  -     T4, Free; Future  -     TSH; Future  -     T3; Future    BMI 40.0-44.9, adult (CMS/[facility])  -     Comprehensive Metabolic Panel; Future  -     Lipid Panel; Future  -     T4, Free; Future  -     TSH; Future  -     T3; Future  -     Hemoglobin A1c; Future  -     topiramate (TOPAMAX) 25 MG tablet; Take 1 tablet by mouth 2 (Two) Times a Day.    Encounter for therapeutic drug level monitoring  -     ToxASSURE Select 13 Discrete -; Future    Class 3 severe obesity due to excess calories without serious comorbidity with body mass index (BMI) of 40.0 to 44.9 in adult (CMS/[facility])         Obesity: topirimate prescribed.  GERD: worsening w/assoc nausea. Famotidine and Zofran prescribed today.  Vitamin D def: refill due today.  Chronic back pain: stable, refill hydrocodone today.  FU 12 weeks.  PHQ-2/PHQ-9 Depression Screening 3/9/2020   Little interest or pleasure in doing things 0   Feeling down, depressed, or hopeless 0   Trouble falling or staying asleep, or sleeping too much 0   Feeling tired or having little energy 0   Poor appetite or overeating 0   Feeling bad about yourself - or that you are a failure or have let yourself or your family down 0   Trouble concentrating on things, such as reading the newspaper or watching television 0   Moving or  speaking so slowly that other people could have noticed. Or the opposite - being so fidgety or restless that you have been moving around a lot more than usual 0   Thoughts that you would be better off dead, or of hurting yourself in some way 0   Total Score 0   Patient seen in office every 3 months for chronic pain requiring opiate pain medication. Compliant with medication, visits with no adverse effects noted. FILEMON and UDS current and appropriate. Patient called requesting scheduled refill at appropriate interval. Patient understands the risks associated with this controlled medication, including tolerance and addiction.  Patient also agrees to only obtain this medication from me, and not from a another provider, unless that provider is covering for me in my absence.  Patient also agrees to be compliant in dosing, and not self adjust the dose of medication.  A signed controlled substance agreement is on file, and the patient has received a controlled substance education sheet at this a previous visit.  The patient has also signed a consent for treatment with a controlled substance as per [facility] policy. FILEMON was obtained.       GIL Darling         Return in about 12 weeks (around 8/7/2020).    There are no Patient Instructions on file for this visit.

## 2020-06-16 ENCOUNTER — TELEPHONE (OUTPATIENT)
Dept: FAMILY MEDICINE CLINIC | Facility: CLINIC | Age: 30
End: 2020-06-16

## 2020-06-16 DIAGNOSIS — M54.41 CHRONIC BILATERAL LOW BACK PAIN WITH BILATERAL SCIATICA: ICD-10-CM

## 2020-06-16 DIAGNOSIS — M54.42 CHRONIC BILATERAL LOW BACK PAIN WITH BILATERAL SCIATICA: ICD-10-CM

## 2020-06-16 DIAGNOSIS — G89.29 CHRONIC BILATERAL LOW BACK PAIN WITH BILATERAL SCIATICA: ICD-10-CM

## 2020-06-16 DIAGNOSIS — M54.2 CHRONIC MIDLINE POSTERIOR NECK PAIN: ICD-10-CM

## 2020-06-16 DIAGNOSIS — G89.29 CHRONIC MIDLINE POSTERIOR NECK PAIN: ICD-10-CM

## 2020-06-16 RX ORDER — HYDROCODONE BITARTRATE AND ACETAMINOPHEN 7.5; 325 MG/1; MG/1
1 TABLET ORAL 2 TIMES DAILY PRN
Qty: 45 TABLET | Refills: 0 | Status: SHIPPED | OUTPATIENT
Start: 2020-06-16 | End: 2020-07-14 | Stop reason: SDUPTHER

## 2020-06-16 NOTE — TELEPHONE ENCOUNTER
Patient seen in office every 3 months for chronic pain requiring opiate pain medication. Compliant with medication, visits with no adverse effects noted. FILEMON and UDS current and appropriate. Patient called requesting scheduled refill at appropriate interval. Patient understands the risks associated with this controlled medication, including tolerance and addiction.  Patient also agrees to only obtain this medication from me, and not from a another provider, unless that provider is covering for me in my absence.  Patient also agrees to be compliant in dosing, and not self adjust the dose of medication.  A signed controlled substance agreement is on file, and the patient has received a controlled substance education sheet at this a previous visit.  The patient has also signed a consent for treatment with a controlled substance as per [facility] policy. FILEMON was obtained.   Refill sent for hydrocodone.     This document has been electronically signed by GIL Darling on June 16, 2020 17:01

## 2020-07-14 DIAGNOSIS — M54.2 CHRONIC MIDLINE POSTERIOR NECK PAIN: ICD-10-CM

## 2020-07-14 DIAGNOSIS — M54.42 CHRONIC BILATERAL LOW BACK PAIN WITH BILATERAL SCIATICA: ICD-10-CM

## 2020-07-14 DIAGNOSIS — G89.29 CHRONIC BILATERAL LOW BACK PAIN WITH BILATERAL SCIATICA: ICD-10-CM

## 2020-07-14 DIAGNOSIS — G89.29 CHRONIC MIDLINE POSTERIOR NECK PAIN: ICD-10-CM

## 2020-07-14 DIAGNOSIS — M54.41 CHRONIC BILATERAL LOW BACK PAIN WITH BILATERAL SCIATICA: ICD-10-CM

## 2020-07-15 ENCOUNTER — TELEPHONE (OUTPATIENT)
Dept: FAMILY MEDICINE CLINIC | Facility: CLINIC | Age: 30
End: 2020-07-15

## 2020-07-15 RX ORDER — HYDROCODONE BITARTRATE AND ACETAMINOPHEN 7.5; 325 MG/1; MG/1
1 TABLET ORAL 2 TIMES DAILY PRN
Qty: 45 TABLET | Refills: 0 | Status: SHIPPED | OUTPATIENT
Start: 2020-07-15 | End: 2020-08-10 | Stop reason: SDUPTHER

## 2020-07-15 NOTE — TELEPHONE ENCOUNTER
Patient seen in office every 3 months for chronic pain requiring opiate pain medication. Compliant with medication, visits with no adverse effects noted. FILEMON and UDS current and appropriate. Patient called requesting scheduled refill at appropriate interval. Patient understands the risks associated with this controlled medication, including tolerance and addiction.  Patient also agrees to only obtain this medication from me, and not from a another provider, unless that provider is covering for me in my absence.  Patient also agrees to be compliant in dosing, and not self adjust the dose of medication.  A signed controlled substance agreement is on file, and the patient has received a controlled substance education sheet at this a previous visit.  The patient has also signed a consent for treatment with a controlled substance as per [facility] policy. FILEMON was obtained.   Refill sent for hydrocodone.     This document has been electronically signed by GIL Darling on July 15, 2020 16:36

## 2020-08-10 ENCOUNTER — OFFICE VISIT (OUTPATIENT)
Dept: FAMILY MEDICINE CLINIC | Facility: CLINIC | Age: 30
End: 2020-08-10

## 2020-08-10 DIAGNOSIS — R73.03 PREDIABETES: ICD-10-CM

## 2020-08-10 DIAGNOSIS — G89.29 CHRONIC BILATERAL LOW BACK PAIN WITH BILATERAL SCIATICA: Primary | ICD-10-CM

## 2020-08-10 DIAGNOSIS — G89.29 CHRONIC MIDLINE POSTERIOR NECK PAIN: ICD-10-CM

## 2020-08-10 DIAGNOSIS — M54.2 CHRONIC MIDLINE POSTERIOR NECK PAIN: ICD-10-CM

## 2020-08-10 DIAGNOSIS — N91.1 SECONDARY AMENORRHEA: ICD-10-CM

## 2020-08-10 DIAGNOSIS — M54.41 CHRONIC BILATERAL LOW BACK PAIN WITH BILATERAL SCIATICA: Primary | ICD-10-CM

## 2020-08-10 DIAGNOSIS — M54.42 CHRONIC BILATERAL LOW BACK PAIN WITH BILATERAL SCIATICA: Primary | ICD-10-CM

## 2020-08-10 DIAGNOSIS — F17.200 SMOKER UNMOTIVATED TO QUIT: ICD-10-CM

## 2020-08-10 PROCEDURE — 99442 PR PHYS/QHP TELEPHONE EVALUATION 11-20 MIN: CPT | Performed by: NURSE PRACTITIONER

## 2020-08-10 RX ORDER — HYDROCODONE BITARTRATE AND ACETAMINOPHEN 7.5; 325 MG/1; MG/1
1 TABLET ORAL 2 TIMES DAILY PRN
Qty: 45 TABLET | Refills: 0 | Status: SHIPPED | OUTPATIENT
Start: 2020-08-10 | End: 2020-09-10 | Stop reason: SDUPTHER

## 2020-08-10 NOTE — PROGRESS NOTES
Chief Complaint   Patient presents with   • Back Pain   • Neck Pain     Subjective   Marizol WATKINS is a 29 y.o. female who presents to the office by telephone visit due to pandemic, for routine follow up of chronic neck and lower back pain, GERD, and weight loss.     The following portions of the patient's history were reviewed and updated as appropriate: allergies, current medications, past family history, past medical history, past social history, past surgical history and problem list.    History of Present Illness   You have chosen to receive care through a telephone visit. Do you consent to use a telephone visit for your medical care today? Yes  18 minutes medical discussion.   Obesity: was unable to tolerate topirimate for weight loss due to associated drowsiness. 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  Reports pain 6/10 today after med but also after significant lifting and bending. Feels fairly well controlled with current hydrocodone. Due for refill today.     GERD: improved with addition of famotidine..   New problems today: reports secondary amenorrhea, no period since June.   Reports is still smoking daily, discussed risks of smoking especially with possible pregnancy not willing to stop at present.   Parts of most recent relevant visit HPI, ROS  and PE may be carried forward and all are updated as appropriate for current situation.       Past Medical History:   Diagnosis Date   • Acute bronchitis    • Anxiety    • Bipolar disease, chronic (CMS/HCC)    • Cough    • Itch of skin    • Mild depression (CMS/HCC)    • Visit for gynecologic examination           Family History   Problem Relation Age of Onset   • No Known Problems Mother    • Diabetes Father    • Heart disease Father    • Hypertension Maternal Grandmother    • Breast cancer Paternal Grandmother    • Colon cancer Neg Hx    • Endometrial cancer Neg Hx    • Ovarian cancer Neg Hx      "    Review of Systems   Constitutional: Negative.  Negative for fatigue, fever and unexpected weight change.        Always feel \"tired\"   HENT: Negative.    Eyes: Negative.    Respiratory: Negative for cough, chest tightness and shortness of breath.    Cardiovascular: Negative.  Negative for chest pain.   Gastrointestinal: Negative.    Endocrine: Negative.    Genitourinary: Positive for menstrual problem. Negative for dysuria.   Musculoskeletal: Positive for arthralgias, back pain and neck pain.        Neck and shoulder pain a \"4\" like a dull ache at times \"sharp\". Lower mid back and between shoulders with intermittent sciatica  Using Ibuprofen for pain prn   Skin: Negative.  Negative for color change, pallor, rash and wound.   Allergic/Immunologic: Negative.    Neurological: Negative.    Hematological: Negative.    Psychiatric/Behavioral: Negative.  Negative for sleep disturbance and suicidal ideas.   All other systems reviewed and are negative.      Objective   Vitals:    08/10/20 1223   PainSc:   6   PainLoc: Back     Physical Exam   Constitutional: She is oriented to person, place, and time. No distress.   Pulmonary/Chest: Effort normal. No stridor. No respiratory distress.   Neurological: She is oriented to person, place, and time.   Psychiatric: She has a normal mood and affect. Her behavior is normal. Judgment and thought content normal.       Assessment/Plan   Marizol was seen today for back pain and neck pain.    Diagnoses and all orders for this visit:    Chronic bilateral low back pain with bilateral sciatica  -     HYDROcodone-acetaminophen (NORCO) 7.5-325 MG per tablet; Take 1 tablet by mouth 2 (Two) Times a Day As Needed for Moderate Pain  (only when necessary).    Chronic midline posterior neck pain  -     HYDROcodone-acetaminophen (NORCO) 7.5-325 MG per tablet; Take 1 tablet by mouth 2 (Two) Times a Day As Needed for Moderate Pain  (only when necessary).    Secondary amenorrhea  -     Pregnancy, Urine - " Urine, Clean Catch  -     hCG, Quantitative, Pregnancy    Prediabetes  -     Discontinue: metFORMIN (GLUCOPHAGE) 500 MG tablet; Take 1 tablet by mouth Daily Before Supper. For high sugar/ insulin resistance  -     metFORMIN (GLUCOPHAGE) 500 MG tablet; Take 1 tablet by mouth 2 (Two) Times a Day With Meals.    Smoker unmotivated to quit          I advised the patient of the risks in continuing to use tobacco, and have advised the patient to stop smoking. He/She is not willing to stop at present.  5 minutes were spent discussing risks and benefits of smoking/ smoking cessation.     Stable chronic neck and back pain, refill hydrocodone due and sent.  New onset secondary amenorrhea, no period since June, two negative urine pregnancy tests at home, will get urine pregnancy and serum HCG quantitative in am.  Due for other labs, previously ordered, will have drawn in am as well.   PHQ-2/PHQ-9 Depression Screening 3/9/2020   Little interest or pleasure in doing things 0   Feeling down, depressed, or hopeless 0   Trouble falling or staying asleep, or sleeping too much 0   Feeling tired or having little energy 0   Poor appetite or overeating 0   Feeling bad about yourself - or that you are a failure or have let yourself or your family down 0   Trouble concentrating on things, such as reading the newspaper or watching television 0   Moving or speaking so slowly that other people could have noticed. Or the opposite - being so fidgety or restless that you have been moving around a lot more than usual 0   Thoughts that you would be better off dead, or of hurting yourself in some way 0   Total Score 0   Patient understands the risks associated with this controlled medication, including tolerance and addiction.  Patient also agrees to only obtain this medication from me, and not from a another provider, unless that provider is covering for me in my absence.  Patient also agrees to be compliant in dosing, and not self adjust the dose  of medication.  A signed controlled substance agreement is on file, and the patient has received a controlled substance education sheet at this a previous visit.  The patient has also signed a consent for treatment with a controlled substance as per [facility] policy. FILEMON was obtained.      GIL Darling         Return in about 12 weeks (around 11/2/2020).    There are no Patient Instructions on file for this visit.

## 2020-08-11 ENCOUNTER — LAB (OUTPATIENT)
Dept: LAB | Facility: OTHER | Age: 30
End: 2020-08-11

## 2020-08-11 DIAGNOSIS — E55.9 VITAMIN D DEFICIENCY: ICD-10-CM

## 2020-08-11 DIAGNOSIS — Z51.81 ENCOUNTER FOR THERAPEUTIC DRUG LEVEL MONITORING: ICD-10-CM

## 2020-08-11 DIAGNOSIS — E61.1 IRON DEFICIENCY: ICD-10-CM

## 2020-08-11 DIAGNOSIS — R73.9 HYPERGLYCEMIA: ICD-10-CM

## 2020-08-11 DIAGNOSIS — Z13.29 SCREENING FOR THYROID DISORDER: ICD-10-CM

## 2020-08-11 DIAGNOSIS — D50.9 MICROCYTIC ANEMIA: ICD-10-CM

## 2020-08-11 LAB
ALBUMIN SERPL-MCNC: 3.8 G/DL (ref 3.5–5)
ALBUMIN/GLOB SERPL: 1.2 G/DL (ref 1.1–1.8)
ALP SERPL-CCNC: 61 U/L (ref 38–126)
ALT SERPL W P-5'-P-CCNC: 14 U/L
ANION GAP SERPL CALCULATED.3IONS-SCNC: 6 MMOL/L (ref 5–15)
AST SERPL-CCNC: 20 U/L (ref 14–36)
B-HCG UR QL: NEGATIVE
BASOPHILS # BLD AUTO: 0.05 10*3/MM3 (ref 0–0.2)
BASOPHILS NFR BLD AUTO: 0.7 % (ref 0–1.5)
BILIRUB SERPL-MCNC: 0.1 MG/DL (ref 0.2–1.3)
BUN SERPL-MCNC: 10 MG/DL (ref 7–23)
BUN/CREAT SERPL: 12.5 (ref 7–25)
CALCIUM SPEC-SCNC: 9.2 MG/DL (ref 8.4–10.2)
CHLORIDE SERPL-SCNC: 105 MMOL/L (ref 101–112)
CHOLEST SERPL-MCNC: 191 MG/DL (ref 150–200)
CO2 SERPL-SCNC: 25 MMOL/L (ref 22–30)
CREAT SERPL-MCNC: 0.8 MG/DL (ref 0.52–1.04)
DEPRECATED RDW RBC AUTO: 42.1 FL (ref 37–54)
EOSINOPHIL # BLD AUTO: 0.3 10*3/MM3 (ref 0–0.4)
EOSINOPHIL NFR BLD AUTO: 4.1 % (ref 0.3–6.2)
ERYTHROCYTE [DISTWIDTH] IN BLOOD BY AUTOMATED COUNT: 14.3 % (ref 12.3–15.4)
GFR SERPL CREATININE-BSD FRML MDRD: 84 ML/MIN/1.73 (ref 64–149)
GLOBULIN UR ELPH-MCNC: 3.1 GM/DL (ref 2.3–3.5)
GLUCOSE SERPL-MCNC: 115 MG/DL (ref 70–99)
HCT VFR BLD AUTO: 39.7 % (ref 34–46.6)
HDLC SERPL-MCNC: 41 MG/DL (ref 40–59)
HGB BLD-MCNC: 13.2 G/DL (ref 12–15.9)
LDLC SERPL CALC-MCNC: 81 MG/DL
LDLC/HDLC SERPL: 1.97 {RATIO} (ref 0–3.22)
LYMPHOCYTES # BLD AUTO: 2.34 10*3/MM3 (ref 0.7–3.1)
LYMPHOCYTES NFR BLD AUTO: 31.8 % (ref 19.6–45.3)
MCH RBC QN AUTO: 27.5 PG (ref 26.6–33)
MCHC RBC AUTO-ENTMCNC: 33.2 G/DL (ref 31.5–35.7)
MCV RBC AUTO: 82.7 FL (ref 79–97)
MONOCYTES # BLD AUTO: 0.62 10*3/MM3 (ref 0.1–0.9)
MONOCYTES NFR BLD AUTO: 8.4 % (ref 5–12)
NEUTROPHILS NFR BLD AUTO: 4.05 10*3/MM3 (ref 1.7–7)
NEUTROPHILS NFR BLD AUTO: 55 % (ref 42.7–76)
PLATELET # BLD AUTO: 246 10*3/MM3 (ref 140–450)
PMV BLD AUTO: 9.7 FL (ref 6–12)
POTASSIUM SERPL-SCNC: 3.6 MMOL/L (ref 3.4–5)
PROT SERPL-MCNC: 6.9 G/DL (ref 6.3–8.6)
RBC # BLD AUTO: 4.8 10*6/MM3 (ref 3.77–5.28)
SODIUM SERPL-SCNC: 136 MMOL/L (ref 137–145)
TRIGL SERPL-MCNC: 347 MG/DL
VLDLC SERPL-MCNC: 69.4 MG/DL
WBC # BLD AUTO: 7.36 10*3/MM3 (ref 3.4–10.8)

## 2020-08-11 PROCEDURE — 80053 COMPREHEN METABOLIC PANEL: CPT | Performed by: NURSE PRACTITIONER

## 2020-08-11 PROCEDURE — 84480 ASSAY TRIIODOTHYRONINE (T3): CPT | Performed by: NURSE PRACTITIONER

## 2020-08-11 PROCEDURE — 81025 URINE PREGNANCY TEST: CPT | Performed by: NURSE PRACTITIONER

## 2020-08-11 PROCEDURE — 82306 VITAMIN D 25 HYDROXY: CPT | Performed by: NURSE PRACTITIONER

## 2020-08-11 PROCEDURE — 80307 DRUG TEST PRSMV CHEM ANLYZR: CPT | Performed by: NURSE PRACTITIONER

## 2020-08-11 PROCEDURE — 84702 CHORIONIC GONADOTROPIN TEST: CPT | Performed by: NURSE PRACTITIONER

## 2020-08-11 PROCEDURE — 83036 HEMOGLOBIN GLYCOSYLATED A1C: CPT | Performed by: NURSE PRACTITIONER

## 2020-08-11 PROCEDURE — 82607 VITAMIN B-12: CPT | Performed by: NURSE PRACTITIONER

## 2020-08-11 PROCEDURE — 84439 ASSAY OF FREE THYROXINE: CPT | Performed by: NURSE PRACTITIONER

## 2020-08-11 PROCEDURE — G0481 DRUG TEST DEF 8-14 CLASSES: HCPCS | Performed by: NURSE PRACTITIONER

## 2020-08-11 PROCEDURE — 82746 ASSAY OF FOLIC ACID SERUM: CPT | Performed by: NURSE PRACTITIONER

## 2020-08-11 PROCEDURE — 85025 COMPLETE CBC W/AUTO DIFF WBC: CPT | Performed by: NURSE PRACTITIONER

## 2020-08-11 PROCEDURE — 84443 ASSAY THYROID STIM HORMONE: CPT | Performed by: NURSE PRACTITIONER

## 2020-08-11 PROCEDURE — 80061 LIPID PANEL: CPT | Performed by: NURSE PRACTITIONER

## 2020-08-12 LAB
25(OH)D3 SERPL-MCNC: 36.7 NG/ML (ref 30–100)
FOLATE SERPL-MCNC: 8.76 NG/ML (ref 4.78–24.2)
HBA1C MFR BLD: 5.46 % (ref 4.8–5.6)
HCG INTACT+B SERPL-ACNC: <0.5 MIU/ML
T3 SERPL-MCNC: 141 NG/DL (ref 80–200)
T4 FREE SERPL-MCNC: 1.29 NG/DL (ref 0.93–1.7)
TSH SERPL DL<=0.05 MIU/L-ACNC: 2.04 UIU/ML (ref 0.27–4.2)
VIT B12 BLD-MCNC: 552 PG/ML (ref 211–946)

## 2020-08-14 DIAGNOSIS — N91.1 SECONDARY AMENORRHEA: Primary | ICD-10-CM

## 2020-08-17 LAB
6-ACETYLMORPHINE: NEGATIVE
6MAM SERPLBLD-MCNC: NOT DETECTED NG/MG CREAT
7-AMINOCLONAZEPAM UR: NOT DETECTED NG/MG CREAT
A-OH ALPRAZ/CREAT UR: NOT DETECTED NG/MG CREAT
ALFENTANIL UR QL: NOT DETECTED NG/MG CREAT
ALPHA-HYDROXYMIDAZOLAM, URINE: NOT DETECTED NG/MG CREAT
ALPHA-HYDROXYTRIAZOLAM, URINE: NOT DETECTED NG/MG CREAT
AMOBARBITAL UR: NOT DETECTED
AMPHET UR QL CFM: NOT DETECTED NG/MG CREAT
AMPHETAMINES UR QL SCN: NEGATIVE
BARBITAL UR QL CFM: NOT DETECTED
BARBITURATES UR QL SCN: NEGATIVE
BENZODIAZ UR QL SCN: NEGATIVE
BENZOYLECGONINE UR: NOT DETECTED NG/MG CREAT
BUPRENORPHINE UR QL: NEGATIVE
BUPRENORPHINE UR QL: NOT DETECTED NG/MG CREAT
BUTABARBITAL UR QL: NOT DETECTED
BUTALBITAL UR QL: NOT DETECTED
CANNABINOIDS UR QL CFM: NEGATIVE
CLONAZEPAM UR QL: NOT DETECTED NG/MG CREAT
COCAETHYLENE UR QL CFM: NOT DETECTED NG/MG CREAT
COCAINE UR QL CFM: NEGATIVE
CODEINE UR QL: NOT DETECTED NG/MG CREAT
CONV COCAINE, UR: NOT DETECTED NG/MG CREAT
CONV REPORT SUMMARY: NORMAL
CREAT 24H UR-MCNC: 262 MG/DL
DESALKYLFLURAZ/CREAT UR: NOT DETECTED NG/MG CREAT
DESMETHYLFLUNITRAZEPAM: NOT DETECTED NG/MG CREAT
DIAZEPAM UR-MCNC: NOT DETECTED NG/MG CREAT
DIHYDROCODEINE UR: 204 NG/MG CREAT
EDDP SERPL QL: NOT DETECTED NG/MG CREAT
ETHANOL SCREEN URINE (REF): NEGATIVE
ETHANOL UR-MCNC: NOT DETECTED G/DL
FENTANYL UR QL: NEGATIVE
FENTANYL+NORFENTANYL UR QL SCN: NOT DETECTED NG/MG CREAT
FLUNITRAZEPAM SERPLBLD-MCNC: NOT DETECTED NG/MG CREAT
HYDROCODONE UR QL: 3332 NG/MG CREAT
HYDROMORPHONE UR QL: 76 NG/MG CREAT
LEVEL OF DETECTION:: NORMAL
LORAZEPAM UR-MCNC: NOT DETECTED NG/MG CREAT
LORAZEPAM/CREAT UR: NOT DETECTED NG/MG CREAT
MDA SERPLBLD-MCNC: NOT DETECTED NG/MG CREAT
MDMA UR QL SCN: NOT DETECTED NG/MG CREAT
MEPHOBARBITAL UR QL CFM: NOT DETECTED
METHADONE BLD QL SCN: NEGATIVE
METHADONE, URINE: NOT DETECTED NG/MG CREAT
METHAMPHETAMINE UR: NOT DETECTED NG/MG CREAT
MIDAZOLAM UR-MCNC: NOT DETECTED NG/MG CREAT
MORPHINE UR QL: NOT DETECTED NG/MG CREAT
N-DESMETHYLTRAMADOL, U: NOT DETECTED NG/MG CREAT
NARCOTICS UR: NEGATIVE
NORBUPRENORPHINE SERPLBLD-MCNC: NOT DETECTED NG/MG CREAT
NORCODEINE UR-MCNC: NOT DETECTED NG/MG CREAT
NORDIAZEPAM SERPL CFM-MCNC: NOT DETECTED NG/MG CREAT
NORFENTANYL UR: NOT DETECTED NG/MG CREAT
NORMORPHINE: NOT DETECTED NG/MG CREAT
NOROXYMORPHONE: NOT DETECTED NG/MG CREAT
O-DESMETHYLTRAMADOL, UR: NOT DETECTED NG/MG CREAT
OPIATES UR QL CFM: NORMAL
OPIATES, URINE, NORHYDROCODONE: 1136 NG/MG CREAT
OPIATES, URINE, NOROXYCODONE: NOT DETECTED NG/MG CREAT
OXAZEPAM UR QL: NOT DETECTED NG/MG CREAT
OXYCODONE UR QL: NEGATIVE
OXYCODONE UR: NOT DETECTED NG/MG CREAT
OXYMORPHONE UR: NOT DETECTED NG/MG CREAT
PENTOBARBITAL UR: NOT DETECTED
PHENOBARB UR QL: NOT DETECTED
SECOBARBITAL UR QL: NOT DETECTED
SUFENTANIL UR QL: NOT DETECTED NG/MG CREAT
TAPENTADOL SERPLBLD-MCNC: NEGATIVE NG/ML
TAPENTADOL UR-MCNC: NOT DETECTED NG/MG CREAT
TEMAZEPAM UR QL CFM: NOT DETECTED NG/MG CREAT
THC UR CFM-MCNC: NOT DETECTED NG/MG CREAT
THIOPENTAL UR QL CFM: NOT DETECTED
TRAMADOL UR: NOT DETECTED NG/MG CREAT

## 2020-08-25 DIAGNOSIS — E61.1 IRON DEFICIENCY: ICD-10-CM

## 2020-08-25 DIAGNOSIS — E16.1 HYPERINSULINEMIA: Primary | ICD-10-CM

## 2020-08-25 DIAGNOSIS — E78.1 HYPERTRIGLYCERIDEMIA: ICD-10-CM

## 2020-09-10 ENCOUNTER — TELEPHONE (OUTPATIENT)
Dept: FAMILY MEDICINE CLINIC | Facility: CLINIC | Age: 30
End: 2020-09-10

## 2020-09-10 DIAGNOSIS — G89.29 CHRONIC BILATERAL LOW BACK PAIN WITH BILATERAL SCIATICA: ICD-10-CM

## 2020-09-10 DIAGNOSIS — M54.42 CHRONIC BILATERAL LOW BACK PAIN WITH BILATERAL SCIATICA: ICD-10-CM

## 2020-09-10 DIAGNOSIS — M54.2 CHRONIC MIDLINE POSTERIOR NECK PAIN: ICD-10-CM

## 2020-09-10 DIAGNOSIS — K21.9 GASTROESOPHAGEAL REFLUX DISEASE, ESOPHAGITIS PRESENCE NOT SPECIFIED: ICD-10-CM

## 2020-09-10 DIAGNOSIS — G89.29 CHRONIC MIDLINE POSTERIOR NECK PAIN: ICD-10-CM

## 2020-09-10 DIAGNOSIS — M54.41 CHRONIC BILATERAL LOW BACK PAIN WITH BILATERAL SCIATICA: ICD-10-CM

## 2020-09-10 RX ORDER — HYDROCODONE BITARTRATE AND ACETAMINOPHEN 7.5; 325 MG/1; MG/1
1 TABLET ORAL 2 TIMES DAILY PRN
Qty: 45 TABLET | Refills: 0 | Status: SHIPPED | OUTPATIENT
Start: 2020-09-10 | End: 2020-10-12 | Stop reason: SDUPTHER

## 2020-09-10 RX ORDER — PANTOPRAZOLE SODIUM 40 MG/1
40 TABLET, DELAYED RELEASE ORAL DAILY
Qty: 90 TABLET | Refills: 1 | Status: SHIPPED | OUTPATIENT
Start: 2020-09-10 | End: 2021-03-25 | Stop reason: SDUPTHER

## 2020-09-10 NOTE — TELEPHONE ENCOUNTER
Patient seen in office every 3 months for chronic pain requiring opiate pain medication. Compliant with medication, visits with no adverse effects noted. FILEMON and UDS current and appropriate. Patient called requesting scheduled refill at appropriate interval. Patient understands the risks associated with this controlled medication, including tolerance and addiction.  Patient also agrees to only obtain this medication from me, and not from a another provider, unless that provider is covering for me in my absence.  Patient also agrees to be compliant in dosing, and not self adjust the dose of medication.  A signed controlled substance agreement is on file, and the patient has received a controlled substance education sheet at this a previous visit.  The patient has also signed a consent for treatment with a controlled substance as per [facility] policy. FILEMON was obtained.   Refill sent for hydrocodone.     This document has been electronically signed by GIL Darling on September 10, 2020 17:07

## 2020-10-12 ENCOUNTER — TELEPHONE (OUTPATIENT)
Dept: FAMILY MEDICINE CLINIC | Facility: CLINIC | Age: 30
End: 2020-10-12

## 2020-10-12 DIAGNOSIS — M54.2 CHRONIC MIDLINE POSTERIOR NECK PAIN: ICD-10-CM

## 2020-10-12 DIAGNOSIS — M54.41 CHRONIC BILATERAL LOW BACK PAIN WITH BILATERAL SCIATICA: ICD-10-CM

## 2020-10-12 DIAGNOSIS — G89.29 CHRONIC BILATERAL LOW BACK PAIN WITH BILATERAL SCIATICA: ICD-10-CM

## 2020-10-12 DIAGNOSIS — M54.42 CHRONIC BILATERAL LOW BACK PAIN WITH BILATERAL SCIATICA: ICD-10-CM

## 2020-10-12 DIAGNOSIS — G89.29 CHRONIC MIDLINE POSTERIOR NECK PAIN: ICD-10-CM

## 2020-10-12 RX ORDER — HYDROCODONE BITARTRATE AND ACETAMINOPHEN 7.5; 325 MG/1; MG/1
1 TABLET ORAL 2 TIMES DAILY PRN
Qty: 45 TABLET | Refills: 0 | Status: SHIPPED | OUTPATIENT
Start: 2020-10-12 | End: 2020-11-03 | Stop reason: SDUPTHER

## 2020-10-12 NOTE — TELEPHONE ENCOUNTER
Patient seen in office every 3 months for chronic pain requiring opiate pain medication. Compliant with medication, visits with no adverse effects noted. FILEMON and UDS current and appropriate. Patient called requesting scheduled refill at appropriate interval. Patient understands the risks associated with this controlled medication, including tolerance and addiction.  Patient also agrees to only obtain this medication from me, and not from a another provider, unless that provider is covering for me in my absence.  Patient also agrees to be compliant in dosing, and not self adjust the dose of medication.  A signed controlled substance agreement is on file, and the patient has received a controlled substance education sheet at this a previous visit.  The patient has also signed a consent for treatment with a controlled substance as per [facility] policy. FILEMON was obtained.   Refill sent for hydrocodone.     This document has been electronically signed by GIL Darling on October 12, 2020 11:56 CDT

## 2020-11-03 ENCOUNTER — OFFICE VISIT (OUTPATIENT)
Dept: FAMILY MEDICINE CLINIC | Facility: CLINIC | Age: 30
End: 2020-11-03

## 2020-11-03 VITALS
OXYGEN SATURATION: 96 % | WEIGHT: 236.2 LBS | RESPIRATION RATE: 16 BRPM | DIASTOLIC BLOOD PRESSURE: 66 MMHG | HEIGHT: 62 IN | BODY MASS INDEX: 43.47 KG/M2 | SYSTOLIC BLOOD PRESSURE: 104 MMHG | HEART RATE: 91 BPM | TEMPERATURE: 97.8 F

## 2020-11-03 DIAGNOSIS — G89.29 CHRONIC BILATERAL LOW BACK PAIN WITH BILATERAL SCIATICA: ICD-10-CM

## 2020-11-03 DIAGNOSIS — G89.29 CHRONIC MIDLINE POSTERIOR NECK PAIN: ICD-10-CM

## 2020-11-03 DIAGNOSIS — M54.42 CHRONIC BILATERAL LOW BACK PAIN WITH BILATERAL SCIATICA: ICD-10-CM

## 2020-11-03 DIAGNOSIS — M54.41 CHRONIC BILATERAL LOW BACK PAIN WITH BILATERAL SCIATICA: ICD-10-CM

## 2020-11-03 DIAGNOSIS — M54.2 CHRONIC MIDLINE POSTERIOR NECK PAIN: ICD-10-CM

## 2020-11-03 PROCEDURE — 99214 OFFICE O/P EST MOD 30 MIN: CPT | Performed by: NURSE PRACTITIONER

## 2020-11-03 RX ORDER — HYDROCODONE BITARTRATE AND ACETAMINOPHEN 7.5; 325 MG/1; MG/1
1 TABLET ORAL 2 TIMES DAILY PRN
Qty: 45 TABLET | Refills: 0 | Status: SHIPPED | OUTPATIENT
Start: 2020-11-03 | End: 2020-11-13 | Stop reason: SDUPTHER

## 2020-11-03 NOTE — PROGRESS NOTES
Chief Complaint   Patient presents with   • Pain     12 wk      Subjective   Marizol WATKINS is a 29 y.o. female who presents to the office for Routine follow-up of chronic pain and other chronic conditions.  The following portions of the patient's history were reviewed and updated as appropriate: allergies, current medications, past family history, past medical history, past social history, past surgical history and problem list.    History of Present Illness   Vital signs are stable and patient appears overall well.  Presents today for routine follow-up of chronic conditions including chronic pain of the lower back and the neck.    Chronic lower back pain with bilateral sciatica, dated degenerative disc disease; onset more than 1 year ago.  Gradually worsening over time.  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  Pain is managed with hydrocodone less than twice daily in the course of a month.  Compliant with use.  Denies adverse effects of medication.  Due for refill today. Reports Is having more sciatica pain over the past month, still just using 1-2 hydrocodone daily, usually only one, but notes the pain down her buttocks into her legs is affecting her sleep at night and her concentration during the day when at school in class.     Chronic neck pain due to degenerative disc disease of the cervical spine: Onset more than 1 year ago.  Gradually worsening over time.  Also managed with hydrocodone less than twice daily over the course of each month.  Compliant with use denies adverse effects of medication.  Due for refill today.    She is due for repeat labs previously ordered lipid, CBC, insulin level, iron profile.      Past Medical History:   Diagnosis Date   • Acute bronchitis    • Anxiety    • Bipolar disease, chronic (CMS/HCC)    • Cough    • Itch of skin    • Mild depression (CMS/HCC)    • Visit for gynecologic examination           Family History   Problem Relation Age of Onset   • No Known  "Problems Mother    • Diabetes Father    • Heart disease Father    • Hypertension Maternal Grandmother    • Breast cancer Paternal Grandmother    • Colon cancer Neg Hx    • Endometrial cancer Neg Hx    • Ovarian cancer Neg Hx         Review of Systems   Constitutional: Negative.  Negative for fever and unexpected weight change.   HENT: Negative.    Eyes: Negative.    Respiratory: Negative.  Negative for cough, chest tightness and shortness of breath.    Cardiovascular: Negative.  Negative for chest pain.   Gastrointestinal: Negative.    Endocrine: Negative.    Genitourinary: Negative.  Negative for dysuria.   Musculoskeletal: Positive for back pain and neck pain.   Skin: Negative.  Negative for color change, pallor, rash and wound.   Allergic/Immunologic: Negative.    Neurological: Negative.    Hematological: Negative.    Psychiatric/Behavioral: Negative.  Negative for sleep disturbance and suicidal ideas.   All other systems reviewed and are negative.      Objective   Vitals:    11/03/20 1327   BP: 104/66   Pulse: 91   Resp: 16   Temp: 97.8 °F (36.6 °C)   SpO2: 96%   Weight: 107 kg (236 lb 3.2 oz)   Height: 157.5 cm (62\")   PainSc:   6   PainLoc: Back  Comment: legs- bilateral     Physical Exam  Vitals signs and nursing note reviewed.   Constitutional:       General: She is not in acute distress.     Appearance: Normal appearance. She is well-developed. She is obese. She is not diaphoretic.   HENT:      Head: Normocephalic and atraumatic.   Eyes:      General: No scleral icterus.        Right eye: No discharge.         Left eye: No discharge.      Conjunctiva/sclera: Conjunctivae normal.      Pupils: Pupils are equal, round, and reactive to light.   Neck:      Musculoskeletal: Normal range of motion and neck supple.      Thyroid: No thyromegaly.      Vascular: No JVD.      Trachea: No tracheal deviation.   Cardiovascular:      Rate and Rhythm: Normal rate and regular rhythm.      Pulses: Normal pulses.      Heart " sounds: Normal heart sounds. No murmur. No friction rub. No gallop.    Pulmonary:      Effort: Pulmonary effort is normal. No respiratory distress.      Breath sounds: Normal breath sounds. No stridor. No wheezing, rhonchi or rales.   Chest:      Chest wall: No tenderness.   Abdominal:      General: Bowel sounds are normal.      Palpations: Abdomen is soft.   Musculoskeletal: Normal range of motion.         General: No tenderness or deformity.   Lymphadenopathy:      Cervical: No cervical adenopathy.   Skin:     General: Skin is warm and dry.      Capillary Refill: Capillary refill takes 2 to 3 seconds.      Coloration: Skin is not pale.      Findings: No erythema or rash.   Neurological:      General: No focal deficit present.      Mental Status: She is alert and oriented to person, place, and time.      Cranial Nerves: No cranial nerve deficit.      Motor: No weakness.   Psychiatric:         Mood and Affect: Mood normal.         Behavior: Behavior normal.         Thought Content: Thought content normal.         Judgment: Judgment normal.         Assessment/Plan   Diagnoses and all orders for this visit:    1. BMI 40.0-44.9, adult (CMS/HCC) (Primary)  -     orlistat (Salty) 60 MG capsule; Take 1 capsule by mouth 3 (Three) Times a Day With Meals.  Dispense: 90 capsule; Refill: 1    2. Chronic bilateral low back pain with bilateral sciatica  -     HYDROcodone-acetaminophen (NORCO) 7.5-325 MG per tablet; Take 1 tablet by mouth 2 (Two) Times a Day As Needed for Moderate Pain  (only when necessary).  Dispense: 45 tablet; Refill: 0  -     MRI Lumbar Spine Without Contrast; Future  -     Ambulatory Referral to Physical Therapy Evaluate and treat    3. Chronic midline posterior neck pain  -     HYDROcodone-acetaminophen (NORCO) 7.5-325 MG per tablet; Take 1 tablet by mouth 2 (Two) Times a Day As Needed for Moderate Pain  (only when necessary).  Dispense: 45 tablet; Refill: 0  -     Ambulatory Referral to Physical Therapy  Evaluate and treat       worsening sciatica. Advised exercise, PT, weight loss. Wants to try orlistat (Salty). Tried and failed Topiramate and phentermine, afraid of bariatric surgery.   Referral to [facility] PT again. Previously sent and [facility] never called her. Given written order for same now.   Stable chronic lower back pain chronic cervicalgia.  Hydrocodone refill sent today.  Due for routine labs previously ordered.  PHQ-2/PHQ-9 Depression Screening 11/3/2020   Little interest or pleasure in doing things 0   Feeling down, depressed, or hopeless 0   Trouble falling or staying asleep, or sleeping too much 2   Feeling tired or having little energy 1   Poor appetite or overeating 0   Feeling bad about yourself - or that you are a failure or have let yourself or your family down 0   Trouble concentrating on things, such as reading the newspaper or watching television 0   Moving or speaking so slowly that other people could have noticed. Or the opposite - being so fidgety or restless that you have been moving around a lot more than usual 0   Thoughts that you would be better off dead, or of hurting yourself in some way 0   Total Score 3   If you checked off any problems, how difficult have these problems made it for you to do your work, take care of things at home, or get along with other people? Somewhat difficult   Patient understands the risks associated with this controlled medication, including tolerance and addiction.  Patient also agrees to only obtain this medication from me, and not from a another provider, unless that provider is covering for me in my absence.  Patient also agrees to be compliant in dosing, and not self adjust the dose of medication.  A signed controlled substance agreement is on file, and the patient has received a controlled substance education sheet at this a previous visit.  The patient has also signed a consent for treatment with a controlled substance as per [facility] policy. FILEMON was  obtained.      Juhi Gordon, APRN         Return in about 12 weeks (around 1/26/2021).    There are no Patient Instructions on file for this visit.

## 2020-11-13 ENCOUNTER — TELEPHONE (OUTPATIENT)
Dept: FAMILY MEDICINE CLINIC | Facility: CLINIC | Age: 30
End: 2020-11-13

## 2020-11-13 DIAGNOSIS — M54.41 CHRONIC BILATERAL LOW BACK PAIN WITH BILATERAL SCIATICA: ICD-10-CM

## 2020-11-13 DIAGNOSIS — G89.29 CHRONIC MIDLINE POSTERIOR NECK PAIN: ICD-10-CM

## 2020-11-13 DIAGNOSIS — G89.29 CHRONIC BILATERAL LOW BACK PAIN WITH BILATERAL SCIATICA: ICD-10-CM

## 2020-11-13 DIAGNOSIS — M54.2 CHRONIC MIDLINE POSTERIOR NECK PAIN: ICD-10-CM

## 2020-11-13 DIAGNOSIS — M54.42 CHRONIC BILATERAL LOW BACK PAIN WITH BILATERAL SCIATICA: ICD-10-CM

## 2020-11-13 RX ORDER — HYDROCODONE BITARTRATE AND ACETAMINOPHEN 7.5; 325 MG/1; MG/1
1 TABLET ORAL 2 TIMES DAILY PRN
Qty: 45 TABLET | Refills: 0 | Status: SHIPPED | OUTPATIENT
Start: 2020-11-13 | End: 2020-12-14 | Stop reason: SDUPTHER

## 2020-11-13 NOTE — TELEPHONE ENCOUNTER
Pharmacist Rohit Richardson from [facility] calls to say they had but lost electronic record of hydrocodone prescription refill sent on 11/3/2020. This was not filled and cannot be unless retransmitted. Refill sent again for this purpose today.     This document has been electronically signed by GIL Darling on November 13, 2020 12:09 CST

## 2020-12-14 ENCOUNTER — TELEPHONE (OUTPATIENT)
Dept: FAMILY MEDICINE CLINIC | Facility: CLINIC | Age: 30
End: 2020-12-14

## 2020-12-14 DIAGNOSIS — M54.41 CHRONIC BILATERAL LOW BACK PAIN WITH BILATERAL SCIATICA: ICD-10-CM

## 2020-12-14 DIAGNOSIS — M54.42 CHRONIC BILATERAL LOW BACK PAIN WITH BILATERAL SCIATICA: ICD-10-CM

## 2020-12-14 DIAGNOSIS — E55.9 VITAMIN D DEFICIENCY: ICD-10-CM

## 2020-12-14 DIAGNOSIS — G89.29 CHRONIC BILATERAL LOW BACK PAIN WITH BILATERAL SCIATICA: ICD-10-CM

## 2020-12-14 DIAGNOSIS — M54.2 CHRONIC MIDLINE POSTERIOR NECK PAIN: ICD-10-CM

## 2020-12-14 DIAGNOSIS — G89.29 CHRONIC MIDLINE POSTERIOR NECK PAIN: ICD-10-CM

## 2020-12-14 RX ORDER — HYDROCODONE BITARTRATE AND ACETAMINOPHEN 7.5; 325 MG/1; MG/1
1 TABLET ORAL 2 TIMES DAILY PRN
Qty: 45 TABLET | Refills: 0 | Status: SHIPPED | OUTPATIENT
Start: 2020-12-14 | End: 2021-01-11 | Stop reason: SDUPTHER

## 2020-12-14 NOTE — TELEPHONE ENCOUNTER
Patient seen in office every 3 months for chronic pain requiring opiate pain medication. Compliant with medication, visits with no adverse effects noted. FILEMON and UDS current and appropriate. Patient called requesting scheduled refill at appropriate interval. Patient understands the risks associated with this controlled medication, including tolerance and addiction.  Patient also agrees to only obtain this medication from me, and not from a another provider, unless that provider is covering for me in my absence.  Patient also agrees to be compliant in dosing, and not self adjust the dose of medication.  A signed controlled substance agreement is on file, and the patient has received a controlled substance education sheet at this a previous visit.  The patient has also signed a consent for treatment with a controlled substance as per [facility] policy. FILEMON was obtained.   Refill sent for hydrocodone.    This document has been electronically signed by GIL Darling on December 14, 2020 16:54 CST

## 2021-01-04 DIAGNOSIS — K21.9 GASTROESOPHAGEAL REFLUX DISEASE: ICD-10-CM

## 2021-01-04 RX ORDER — FAMOTIDINE 40 MG/1
40 TABLET, FILM COATED ORAL DAILY
Qty: 90 TABLET | Refills: 1 | Status: SHIPPED | OUTPATIENT
Start: 2021-01-04 | End: 2021-08-17 | Stop reason: SDUPTHER

## 2021-01-11 ENCOUNTER — TELEPHONE (OUTPATIENT)
Dept: FAMILY MEDICINE CLINIC | Facility: CLINIC | Age: 31
End: 2021-01-11

## 2021-01-11 DIAGNOSIS — M54.42 CHRONIC BILATERAL LOW BACK PAIN WITH BILATERAL SCIATICA: ICD-10-CM

## 2021-01-11 DIAGNOSIS — M54.2 CHRONIC MIDLINE POSTERIOR NECK PAIN: ICD-10-CM

## 2021-01-11 DIAGNOSIS — G89.29 CHRONIC MIDLINE POSTERIOR NECK PAIN: ICD-10-CM

## 2021-01-11 DIAGNOSIS — M54.41 CHRONIC BILATERAL LOW BACK PAIN WITH BILATERAL SCIATICA: ICD-10-CM

## 2021-01-11 DIAGNOSIS — G89.29 CHRONIC BILATERAL LOW BACK PAIN WITH BILATERAL SCIATICA: ICD-10-CM

## 2021-01-11 RX ORDER — HYDROCODONE BITARTRATE AND ACETAMINOPHEN 7.5; 325 MG/1; MG/1
1 TABLET ORAL 2 TIMES DAILY PRN
Qty: 45 TABLET | Refills: 0 | Status: SHIPPED | OUTPATIENT
Start: 2021-01-11 | End: 2021-02-17 | Stop reason: SDUPTHER

## 2021-01-11 NOTE — TELEPHONE ENCOUNTER
Patient seen in office every 3 months for chronic pain requiring opiate pain medication. Compliant with medication, visits with no adverse effects noted. FILEMON and UDS current and appropriate. Patient called requesting scheduled refill at appropriate interval. Patient understands the risks associated with this controlled medication, including tolerance and addiction.  Patient also agrees to only obtain this medication from me, and not from a another provider, unless that provider is covering for me in my absence.  Patient also agrees to be compliant in dosing, and not self adjust the dose of medication.  A signed controlled substance agreement is on file, and the patient has received a controlled substance education sheet at this a previous visit.  The patient has also signed a consent for treatment with a controlled substance as per [facility] policy. FILEMON was obtained.   Refill sent for hydrocodone.     This document has been electronically signed by GIL Darling on January 11, 2021 11:50 CST

## 2021-01-26 ENCOUNTER — OFFICE VISIT (OUTPATIENT)
Dept: FAMILY MEDICINE CLINIC | Facility: CLINIC | Age: 31
End: 2021-01-26

## 2021-01-26 DIAGNOSIS — G89.29 CHRONIC BILATERAL LOW BACK PAIN WITH BILATERAL SCIATICA: ICD-10-CM

## 2021-01-26 DIAGNOSIS — E16.1 HYPERINSULINEMIA: ICD-10-CM

## 2021-01-26 DIAGNOSIS — Z51.81 ENCOUNTER FOR THERAPEUTIC DRUG LEVEL MONITORING: ICD-10-CM

## 2021-01-26 DIAGNOSIS — M54.2 CHRONIC MIDLINE POSTERIOR NECK PAIN: ICD-10-CM

## 2021-01-26 DIAGNOSIS — R73.03 PREDIABETES: ICD-10-CM

## 2021-01-26 DIAGNOSIS — G89.29 CHRONIC MIDLINE POSTERIOR NECK PAIN: ICD-10-CM

## 2021-01-26 DIAGNOSIS — E55.9 VITAMIN D DEFICIENCY: Primary | ICD-10-CM

## 2021-01-26 DIAGNOSIS — E78.1 HYPERTRIGLYCERIDEMIA: ICD-10-CM

## 2021-01-26 DIAGNOSIS — M54.41 CHRONIC BILATERAL LOW BACK PAIN WITH BILATERAL SCIATICA: ICD-10-CM

## 2021-01-26 DIAGNOSIS — M54.42 CHRONIC BILATERAL LOW BACK PAIN WITH BILATERAL SCIATICA: ICD-10-CM

## 2021-01-26 DIAGNOSIS — E61.1 IRON DEFICIENCY: ICD-10-CM

## 2021-01-26 PROCEDURE — 99442 PR PHYS/QHP TELEPHONE EVALUATION 11-20 MIN: CPT | Performed by: NURSE PRACTITIONER

## 2021-01-26 NOTE — PROGRESS NOTES
Subjective   Marizol WATKINS is a 30 y.o. female.   You have chosen to receive care through a telephone visit. Do you consent to use a telephone visit for your medical care today? Yes  11 minutes medical discussion.     No chief complaint on file.       History of Present Illness     Patient here for 12-week follow-up chronic lower back pain, stable.  Onset more than 1 year ago.  Most recent x-ray October 2020 shows normal lumbar spine with mild degenerative changes of the lower thoracic spine.  Pain is managed with hydrocodone 7.5 mg / 325 mg APAP twice daily as needed #45.  Most recently filled on January 11.  Recently underwent dental extraction procedure for abscessed tooth and was prescribed antibiotics and Tylenol 3 ( in a 3-day supply) by Dr. Kothari local dentist.  UDS is due and is expected to show both opiate from hydrocodone as well as this additional codeine.  Compliant with use.  Denies MARI's of medication.  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  Not due for refill at this time. Mouth still a little sore but feels much better than it did.    New complaints today:none.  Patient is also treated for vitamin D deficiency with vitamin D3 50,000 units taken weekly.  GERD treated with Pepcid 40 mg daily in addition to Protonix 40 mg p.o. daily.  Symptoms are well controlled with current medication.  Patient is hyperglycemic and prediabetic category with noted hyperinsulinemia, treated with metformin 500 mg daily.  She has had iron deficiency in the past and is currently taking ferrous sulfate 325 mg p.o. daily and is well overdue for repeat iron levels.  Urine drug screen is also due today.    Above chronic conditions are stable no current or recent exacerbations.    Parts of most recent relevant visit HPI, ROS  and PE may be carried forward and all are updated as appropriate for current situation.    Past Surgical History:   Procedure Laterality Date   •  ADENOIDECTOMY     •  SECTION     • INJECTION OF MEDICATION  2014    Rocephin(1)   • OOPHORECTOMY      ? R tube and ovary at the time of c/s   • TONSILLECTOMY        Social History     Socioeconomic History   • Marital status:      Spouse name: Not on file   • Number of children: Not on file   • Years of education: Not on file   • Highest education level: Not on file   Tobacco Use   • Smoking status: Current Every Day Smoker     Packs/day: 1.00   • Smokeless tobacco: Never Used   Substance and Sexual Activity   • Alcohol use: No     Frequency: Never   • Drug use: No   • Sexual activity: Defer      The following portions of the patient's history were reviewed and updated as appropriate: She  has a past medical history of Acute bronchitis, Anxiety, Bipolar disease, chronic (CMS/HCC), Cough, Itch of skin, Mild depression (CMS/HCC), and Visit for gynecologic examination..    Review of Systems   Constitutional: Negative.    HENT: Negative.  Negative for congestion.    Eyes: Negative.  Negative for blurred vision, double vision, photophobia and visual disturbance.   Respiratory: Negative.  Negative for cough, shortness of breath and stridor.    Cardiovascular: Negative.  Negative for chest pain, palpitations and leg swelling.   Gastrointestinal: Negative.  Negative for abdominal distention, abdominal pain, blood in stool, constipation, diarrhea, nausea, vomiting, GERD and indigestion.   Endocrine: Negative.  Negative for cold intolerance and heat intolerance.   Genitourinary: Negative.  Negative for dysuria, flank pain, frequency and urinary incontinence.   Musculoskeletal: Positive for arthralgias and back pain.   Skin: Negative.    Allergic/Immunologic: Negative.  Negative for immunocompromised state.   Neurological: Negative.    Hematological: Negative.    Psychiatric/Behavioral: Negative.  Negative for agitation, behavioral problems, decreased concentration, dysphoric mood, hallucinations,  self-injury, sleep disturbance, suicidal ideas, negative for hyperactivity, depressed mood and stress. The patient is not nervous/anxious.    All other systems reviewed and are negative.    PHQ-9 Depression Screening  Little interest or pleasure in doing things?     Feeling down, depressed, or hopeless?     Trouble falling or staying asleep, or sleeping too much?     Feeling tired or having little energy?     Poor appetite or overeating?     Feeling bad about yourself - or that you are a failure or have let yourself or your family down?     Trouble concentrating on things, such as reading the newspaper or watching television?     Moving or speaking so slowly that other people could have noticed? Or the opposite - being so fidgety or restless that you have been moving around a lot more than usual?     Thoughts that you would be better off dead, or of hurting yourself in some way?     PHQ-9 Total Score     If you checked off any problems, how difficult have these problems made it for you to do your work, take care of things at home, or get along with other people?      Patient understands the risks associated with this controlled medication, including tolerance and addiction.  Patient also agrees to only obtain this medication from me, and not from a another provider, unless that provider is covering for me in my absence.  Patient also agrees to be compliant in dosing, and not self adjust the dose of medication.  A signed controlled substance agreement is on file, and the patient has received a controlled substance education sheet at this a previous visit.  The patient has also signed a consent for treatment with a controlled substance as per [facility] policy. FILEMON was obtained.    Objective   Physical Exam  Constitutional:       General: She is not in acute distress.     Appearance: She is not ill-appearing.   Pulmonary:      Effort: Pulmonary effort is normal. No respiratory distress.      Breath sounds: No  stridor.   Neurological:      General: No focal deficit present.      Mental Status: She is alert and oriented to person, place, and time. Mental status is at baseline.   Psychiatric:         Mood and Affect: Mood normal.         Behavior: Behavior normal.         Thought Content: Thought content normal.         Judgment: Judgment normal.           Assessment/Plan   Diagnoses and all orders for this visit:    1. Vitamin D deficiency (Primary)  -     Vitamin D 25 Hydroxy    2. Hyperinsulinemia  -     CBC & Differential  -     Comprehensive Metabolic Panel  -     Hemoglobin A1c  -     Insulin, Free & Total, Serum    3. Iron deficiency  -     CBC & Differential  -     Iron Profile    4. Prediabetes  -     Comprehensive Metabolic Panel  -     Hemoglobin A1c  -     Insulin, Free & Total, Serum    5. Hypertriglyceridemia  -     Lipid Panel  -     T4, Free  -     TSH  -     T3    6. Encounter for therapeutic drug level monitoring  -     ToxASSURE Select 13 Discrete -    7. Chronic bilateral low back pain with bilateral sciatica    8. Chronic midline posterior neck pain      Return in about 12 weeks (around 4/20/2021), or if symptoms worsen or fail to improve.   Patient Instructions   Have labs collected in the next 7 days                  This document has been electronically signed by GIL Darling on January 26, 2021 13:38 CST

## 2021-01-29 DIAGNOSIS — M54.41 CHRONIC BILATERAL LOW BACK PAIN WITH BILATERAL SCIATICA: ICD-10-CM

## 2021-01-29 DIAGNOSIS — M54.42 CHRONIC BILATERAL LOW BACK PAIN WITH BILATERAL SCIATICA: ICD-10-CM

## 2021-01-29 DIAGNOSIS — G89.29 CHRONIC BILATERAL LOW BACK PAIN WITH BILATERAL SCIATICA: ICD-10-CM

## 2021-02-01 ENCOUNTER — LAB (OUTPATIENT)
Dept: LAB | Facility: OTHER | Age: 31
End: 2021-02-01

## 2021-02-01 DIAGNOSIS — E61.1 IRON DEFICIENCY: ICD-10-CM

## 2021-02-01 DIAGNOSIS — E78.1 HYPERTRIGLYCERIDEMIA: ICD-10-CM

## 2021-02-01 DIAGNOSIS — E16.1 HYPERINSULINEMIA: ICD-10-CM

## 2021-02-01 LAB
25(OH)D3 SERPL-MCNC: 19.8 NG/ML (ref 30–100)
ALBUMIN SERPL-MCNC: 3.8 G/DL (ref 3.5–5)
ALBUMIN/GLOB SERPL: 1.2 G/DL (ref 1.1–1.8)
ALP SERPL-CCNC: 56 U/L (ref 38–126)
ALT SERPL W P-5'-P-CCNC: 14 U/L
ANION GAP SERPL CALCULATED.3IONS-SCNC: 9 MMOL/L (ref 5–15)
ANISOCYTOSIS BLD QL: NORMAL
AST SERPL-CCNC: 20 U/L (ref 14–36)
BASOPHILS # BLD MANUAL: 0.07 10*3/MM3 (ref 0–0.2)
BASOPHILS NFR BLD AUTO: 1 % (ref 0–1.5)
BILIRUB SERPL-MCNC: 0.3 MG/DL (ref 0.2–1.3)
BUN SERPL-MCNC: 12 MG/DL (ref 7–23)
BUN/CREAT SERPL: 17.4 (ref 7–25)
CALCIUM SPEC-SCNC: 8.7 MG/DL (ref 8.4–10.2)
CHLORIDE SERPL-SCNC: 104 MMOL/L (ref 101–112)
CHOLEST SERPL-MCNC: 188 MG/DL (ref 150–200)
CO2 SERPL-SCNC: 24 MMOL/L (ref 22–30)
CREAT SERPL-MCNC: 0.69 MG/DL (ref 0.52–1.04)
DEPRECATED RDW RBC AUTO: 42.8 FL (ref 37–54)
ELLIPTOCYTES BLD QL SMEAR: NORMAL
EOSINOPHIL # BLD MANUAL: 0.28 10*3/MM3 (ref 0–0.4)
EOSINOPHIL NFR BLD MANUAL: 4 % (ref 0.3–6.2)
ERYTHROCYTE [DISTWIDTH] IN BLOOD BY AUTOMATED COUNT: 16.6 % (ref 12.3–15.4)
FERRITIN SERPL-MCNC: 11.1 NG/ML (ref 13–150)
GFR SERPL CREATININE-BSD FRML MDRD: 99 ML/MIN/1.73 (ref 64–149)
GLOBULIN UR ELPH-MCNC: 3.1 GM/DL (ref 2.3–3.5)
GLUCOSE SERPL-MCNC: 133 MG/DL (ref 70–99)
HBA1C MFR BLD: 5.68 % (ref 4.8–5.6)
HCT VFR BLD AUTO: 35.2 % (ref 34–46.6)
HDLC SERPL-MCNC: 42 MG/DL (ref 40–59)
HGB BLD-MCNC: 11.1 G/DL (ref 12–15.9)
IRON 24H UR-MRATE: 33 MCG/DL (ref 37–145)
IRON SATN MFR SERPL: 7 % (ref 20–50)
LARGE PLATELETS: NORMAL
LDLC SERPL CALC-MCNC: 94 MG/DL
LDLC/HDLC SERPL: 2 {RATIO} (ref 0–3.22)
LYMPHOCYTES # BLD MANUAL: 1.9 10*3/MM3 (ref 0.7–3.1)
LYMPHOCYTES NFR BLD MANUAL: 27 % (ref 19.6–45.3)
LYMPHOCYTES NFR BLD MANUAL: 9 % (ref 5–12)
MCH RBC QN AUTO: 23 PG (ref 26.6–33)
MCHC RBC AUTO-ENTMCNC: 31.5 G/DL (ref 31.5–35.7)
MCV RBC AUTO: 72.9 FL (ref 79–97)
MONOCYTES # BLD AUTO: 0.63 10*3/MM3 (ref 0.1–0.9)
NEUTROPHILS # BLD AUTO: 4.01 10*3/MM3 (ref 1.7–7)
NEUTROPHILS NFR BLD MANUAL: 57 % (ref 42.7–76)
PLATELET # BLD AUTO: 291 10*3/MM3 (ref 140–450)
PMV BLD AUTO: 9.7 FL (ref 6–12)
POTASSIUM SERPL-SCNC: 3.5 MMOL/L (ref 3.4–5)
PROT SERPL-MCNC: 6.9 G/DL (ref 6.3–8.6)
RBC # BLD AUTO: 4.83 10*6/MM3 (ref 3.77–5.28)
SCAN SLIDE: NORMAL
SMALL PLATELETS BLD QL SMEAR: ADEQUATE
SODIUM SERPL-SCNC: 137 MMOL/L (ref 137–145)
T3 SERPL-MCNC: 142 NG/DL (ref 80–200)
T4 FREE SERPL-MCNC: 1.26 NG/DL (ref 0.93–1.7)
TIBC SERPL-MCNC: 481 MCG/DL (ref 298–536)
TRANSFERRIN SERPL-MCNC: 323 MG/DL (ref 200–360)
TRIGL SERPL-MCNC: 310 MG/DL
TSH SERPL DL<=0.05 MIU/L-ACNC: 2.28 UIU/ML (ref 0.27–4.2)
VARIANT LYMPHS NFR BLD MANUAL: 2 % (ref 0–5)
VLDLC SERPL-MCNC: 52 MG/DL (ref 5–40)
WBC # BLD AUTO: 7.04 10*3/MM3 (ref 3.4–10.8)
WBC MORPH BLD: NORMAL

## 2021-02-01 PROCEDURE — 80053 COMPREHEN METABOLIC PANEL: CPT | Performed by: NURSE PRACTITIONER

## 2021-02-01 PROCEDURE — 84480 ASSAY TRIIODOTHYRONINE (T3): CPT | Performed by: NURSE PRACTITIONER

## 2021-02-01 PROCEDURE — 83525 ASSAY OF INSULIN: CPT | Performed by: NURSE PRACTITIONER

## 2021-02-01 PROCEDURE — 83527 ASSAY OF INSULIN: CPT | Performed by: NURSE PRACTITIONER

## 2021-02-01 PROCEDURE — 82306 VITAMIN D 25 HYDROXY: CPT | Performed by: NURSE PRACTITIONER

## 2021-02-01 PROCEDURE — 84443 ASSAY THYROID STIM HORMONE: CPT | Performed by: NURSE PRACTITIONER

## 2021-02-01 PROCEDURE — 80307 DRUG TEST PRSMV CHEM ANLYZR: CPT | Performed by: NURSE PRACTITIONER

## 2021-02-01 PROCEDURE — G0481 DRUG TEST DEF 8-14 CLASSES: HCPCS | Performed by: NURSE PRACTITIONER

## 2021-02-01 PROCEDURE — 80061 LIPID PANEL: CPT | Performed by: NURSE PRACTITIONER

## 2021-02-01 PROCEDURE — 84439 ASSAY OF FREE THYROXINE: CPT | Performed by: NURSE PRACTITIONER

## 2021-02-01 PROCEDURE — 84466 ASSAY OF TRANSFERRIN: CPT | Performed by: NURSE PRACTITIONER

## 2021-02-01 PROCEDURE — 82728 ASSAY OF FERRITIN: CPT | Performed by: NURSE PRACTITIONER

## 2021-02-01 PROCEDURE — 85025 COMPLETE CBC W/AUTO DIFF WBC: CPT | Performed by: NURSE PRACTITIONER

## 2021-02-01 PROCEDURE — 83540 ASSAY OF IRON: CPT | Performed by: NURSE PRACTITIONER

## 2021-02-01 PROCEDURE — 83036 HEMOGLOBIN GLYCOSYLATED A1C: CPT | Performed by: NURSE PRACTITIONER

## 2021-02-04 LAB
6MAM UR QL CFM: NEGATIVE
6MAM/CREAT UR: NOT DETECTED NG/MG CREAT
7AMINOCLONAZEPAM/CREAT UR: NOT DETECTED NG/MG CREAT
A-OH ALPRAZ/CREAT UR: NOT DETECTED NG/MG CREAT
A-OH-TRIAZOLAM/CREAT UR CFM: NOT DETECTED NG/MG CREAT
ALFENTANIL/CREAT UR CFM: NOT DETECTED NG/MG CREAT
ALPHA-HYDROXYMIDAZOLAM, URINE: NOT DETECTED NG/MG CREAT
ALPRAZ/CREAT UR CFM: NOT DETECTED NG/MG CREAT
AMOBARBITAL UR QL CFM: NOT DETECTED
AMPHET/CREAT UR: NOT DETECTED NG/MG CREAT
AMPHETAMINES UR QL CFM: NEGATIVE
BARBITAL UR QL CFM: NOT DETECTED
BARBITURATES UR QL CFM: NEGATIVE
BENZODIAZ UR QL CFM: NEGATIVE
BUPRENORPHINE UR QL CFM: NEGATIVE
BUPRENORPHINE/CREAT UR: NOT DETECTED NG/MG CREAT
BUTABARBITAL UR QL CFM: NOT DETECTED
BUTALBITAL UR QL CFM: NOT DETECTED
BZE/CREAT UR: NOT DETECTED NG/MG CREAT
CANNABINOIDS UR QL CFM: NEGATIVE
CARBOXYTHC/CREAT UR: NOT DETECTED NG/MG CREAT
CLONAZEPAM/CREAT UR CFM: NOT DETECTED NG/MG CREAT
COCAETHYLENE/CREAT UR CFM: NOT DETECTED NG/MG CREAT
COCAINE UR QL CFM: NEGATIVE
COCAINE/CREAT UR CFM: NOT DETECTED NG/MG CREAT
CODEINE/CREAT UR: NOT DETECTED NG/MG CREAT
CREAT UR-MCNC: 66 MG/DL
DESALKYLFLURAZ/CREAT UR: NOT DETECTED NG/MG CREAT
DESMETHYLFLUNITRAZEPAM: NOT DETECTED NG/MG CREAT
DHC/CREAT UR: NOT DETECTED NG/MG CREAT
DIAZEPAM/CREAT UR: NOT DETECTED NG/MG CREAT
DRUGS UR: NORMAL
EDDP/CREAT UR: NOT DETECTED NG/MG CREAT
ETHANOL UR CFM-MCNC: NOT DETECTED G/DL
ETHANOL UR QL CFM: NEGATIVE
FENTANYL UR QL CFM: NEGATIVE
FENTANYL/CREAT UR: NOT DETECTED NG/MG CREAT
FLUNITRAZEPAM UR QL CFM: NOT DETECTED NG/MG CREAT
HYDROCODONE/CREAT UR: 903 NG/MG CREAT
HYDROMORPHONE/CREAT UR: NOT DETECTED NG/MG CREAT
LEVEL OF DETECTION:: NORMAL
LORAZEPAM/CREAT UR: NOT DETECTED NG/MG CREAT
MDA/CREAT UR: NOT DETECTED NG/MG CREAT
MDMA/CREAT UR: NOT DETECTED NG/MG CREAT
MEPHOBARBITAL UR QL CFM: NOT DETECTED
METHADONE UR QL CFM: NEGATIVE
METHADONE/CREAT UR: NOT DETECTED NG/MG CREAT
METHAMPHET/CREAT UR: NOT DETECTED NG/MG CREAT
MIDAZOLAM/CREAT UR CFM: NOT DETECTED NG/MG CREAT
MORPHINE/CREAT UR: NOT DETECTED NG/MG CREAT
N-NORTRAMADOL/CREAT UR CFM: NOT DETECTED NG/MG CREAT
NARCOTICS UR: NEGATIVE
NORBUPRENORPHINE/CREAT UR: NOT DETECTED NG/MG CREAT
NORCODEINE/CREAT UR CFM: NOT DETECTED NG/MG CREAT
NORDIAZEPAM/CREAT UR: NOT DETECTED NG/MG CREAT
NORFENTANYL/CREAT UR: NOT DETECTED NG/MG CREAT
NORHYDROCODONE/CREAT UR: 376 NG/MG CREAT
NORMORPHINE UR-MCNC: NOT DETECTED NG/MG CREAT
NOROXYCODONE/CREAT UR: NOT DETECTED NG/MG CREAT
NOROXYMORPHONE/CREAT UR CFM: NOT DETECTED NG/MG CREAT
O-NORTRAMADOL UR CFM-MCNC: NOT DETECTED NG/MG CREAT
OPIATES UR QL CFM: NORMAL
OXAZEPAM/CREAT UR: NOT DETECTED NG/MG CREAT
OXYCODONE UR QL CFM: NEGATIVE
OXYCODONE/CREAT UR: NOT DETECTED NG/MG CREAT
OXYMORPHONE/CREAT UR: NOT DETECTED NG/MG CREAT
PENTOBARB UR QL CFM: NOT DETECTED
PHENOBARB UR QL CFM: NOT DETECTED
SECOBARBITAL UR QL CFM: NOT DETECTED
SUFENTANIL/CREAT UR CFM: NOT DETECTED NG/MG CREAT
TAPENTADOL UR QL CFM: NEGATIVE
TAPENTADOL/CREAT UR: NOT DETECTED NG/MG CREAT
TEMAZEPAM/CREAT UR: NOT DETECTED NG/MG CREAT
THIOPENTAL UR QL CFM: NOT DETECTED
TRAMADOL UR QL CFM: NOT DETECTED NG/MG CREAT

## 2021-02-08 LAB
INSULIN FREE SERPL-ACNC: 180 UU/ML
INSULIN SERPL-ACNC: 180 UU/ML

## 2021-02-15 DIAGNOSIS — E61.1 IRON DEFICIENCY: ICD-10-CM

## 2021-02-15 DIAGNOSIS — E16.1 HYPERINSULINEMIA: Primary | ICD-10-CM

## 2021-02-15 RX ORDER — LANOLIN ALCOHOL/MO/W.PET/CERES
325 CREAM (GRAM) TOPICAL 2 TIMES DAILY
Qty: 60 TABLET | Refills: 0 | Status: SHIPPED | OUTPATIENT
Start: 2021-02-15 | End: 2021-03-15

## 2021-02-17 ENCOUNTER — TELEPHONE (OUTPATIENT)
Dept: FAMILY MEDICINE CLINIC | Facility: CLINIC | Age: 31
End: 2021-02-17

## 2021-02-17 DIAGNOSIS — G89.29 CHRONIC BILATERAL LOW BACK PAIN WITH BILATERAL SCIATICA: ICD-10-CM

## 2021-02-17 DIAGNOSIS — M54.2 CHRONIC MIDLINE POSTERIOR NECK PAIN: ICD-10-CM

## 2021-02-17 DIAGNOSIS — E61.1 IRON DEFICIENCY: Primary | ICD-10-CM

## 2021-02-17 DIAGNOSIS — M54.41 CHRONIC BILATERAL LOW BACK PAIN WITH BILATERAL SCIATICA: ICD-10-CM

## 2021-02-17 DIAGNOSIS — E55.9 VITAMIN D DEFICIENCY: ICD-10-CM

## 2021-02-17 DIAGNOSIS — M54.42 CHRONIC BILATERAL LOW BACK PAIN WITH BILATERAL SCIATICA: ICD-10-CM

## 2021-02-17 DIAGNOSIS — G89.29 CHRONIC MIDLINE POSTERIOR NECK PAIN: ICD-10-CM

## 2021-02-17 RX ORDER — HYDROCODONE BITARTRATE AND ACETAMINOPHEN 7.5; 325 MG/1; MG/1
1 TABLET ORAL 2 TIMES DAILY PRN
Qty: 45 TABLET | Refills: 0 | Status: SHIPPED | OUTPATIENT
Start: 2021-02-17 | End: 2021-03-25 | Stop reason: SDUPTHER

## 2021-03-08 ENCOUNTER — LAB (OUTPATIENT)
Dept: ONCOLOGY | Facility: HOSPITAL | Age: 31
End: 2021-03-08

## 2021-03-08 ENCOUNTER — CONSULT (OUTPATIENT)
Dept: ONCOLOGY | Facility: CLINIC | Age: 31
End: 2021-03-08

## 2021-03-08 VITALS
TEMPERATURE: 97.6 F | HEIGHT: 62 IN | SYSTOLIC BLOOD PRESSURE: 129 MMHG | BODY MASS INDEX: 45.67 KG/M2 | HEART RATE: 113 BPM | DIASTOLIC BLOOD PRESSURE: 61 MMHG | RESPIRATION RATE: 18 BRPM | WEIGHT: 248.2 LBS

## 2021-03-08 DIAGNOSIS — N92.0 MENORRHAGIA WITH REGULAR CYCLE: ICD-10-CM

## 2021-03-08 DIAGNOSIS — E66.01 MORBIDLY OBESE (HCC): ICD-10-CM

## 2021-03-08 DIAGNOSIS — D50.9 IRON DEFICIENCY ANEMIA, UNSPECIFIED IRON DEFICIENCY ANEMIA TYPE: Primary | ICD-10-CM

## 2021-03-08 DIAGNOSIS — K90.9 IRON MALABSORPTION: ICD-10-CM

## 2021-03-08 DIAGNOSIS — D50.9 IRON DEFICIENCY ANEMIA, UNSPECIFIED IRON DEFICIENCY ANEMIA TYPE: ICD-10-CM

## 2021-03-08 PROBLEM — E61.1 IRON DEFICIENCY: Status: ACTIVE | Noted: 2021-03-08

## 2021-03-08 LAB
BASOPHILS # BLD AUTO: 0.04 10*3/MM3 (ref 0–0.2)
BASOPHILS NFR BLD AUTO: 0.4 % (ref 0–1.5)
DEPRECATED RDW RBC AUTO: 41.3 FL (ref 37–54)
EOSINOPHIL # BLD AUTO: 0.3 10*3/MM3 (ref 0–0.4)
EOSINOPHIL NFR BLD AUTO: 3.3 % (ref 0.3–6.2)
ERYTHROCYTE [DISTWIDTH] IN BLOOD BY AUTOMATED COUNT: 15.9 % (ref 12.3–15.4)
FERRITIN SERPL-MCNC: 10.89 NG/ML (ref 13–150)
FOLATE SERPL-MCNC: 8.5 NG/ML (ref 4.78–24.2)
HCT VFR BLD AUTO: 34.9 % (ref 34–46.6)
HGB BLD-MCNC: 10.8 G/DL (ref 12–15.9)
IMM GRANULOCYTES # BLD AUTO: 0.04 10*3/MM3 (ref 0–0.05)
IMM GRANULOCYTES NFR BLD AUTO: 0.4 % (ref 0–0.5)
IRON 24H UR-MRATE: 30 MCG/DL (ref 37–145)
IRON SATN MFR SERPL: 6 % (ref 20–50)
LYMPHOCYTES # BLD AUTO: 2.79 10*3/MM3 (ref 0.7–3.1)
LYMPHOCYTES NFR BLD AUTO: 30.8 % (ref 19.6–45.3)
MCH RBC QN AUTO: 22.5 PG (ref 26.6–33)
MCHC RBC AUTO-ENTMCNC: 30.9 G/DL (ref 31.5–35.7)
MCV RBC AUTO: 72.6 FL (ref 79–97)
MONOCYTES # BLD AUTO: 0.67 10*3/MM3 (ref 0.1–0.9)
MONOCYTES NFR BLD AUTO: 7.4 % (ref 5–12)
NEUTROPHILS NFR BLD AUTO: 5.22 10*3/MM3 (ref 1.7–7)
NEUTROPHILS NFR BLD AUTO: 57.7 % (ref 42.7–76)
NRBC BLD AUTO-RTO: 0 /100 WBC (ref 0–0.2)
PLATELET # BLD AUTO: 350 10*3/MM3 (ref 140–450)
PMV BLD AUTO: 9.6 FL (ref 6–12)
RBC # BLD AUTO: 4.81 10*6/MM3 (ref 3.77–5.28)
TIBC SERPL-MCNC: 495 MCG/DL (ref 298–536)
TRANSFERRIN SERPL-MCNC: 332 MG/DL (ref 200–360)
VIT B12 BLD-MCNC: 404 PG/ML (ref 211–946)
WBC # BLD AUTO: 9.06 10*3/MM3 (ref 3.4–10.8)

## 2021-03-08 PROCEDURE — 82728 ASSAY OF FERRITIN: CPT

## 2021-03-08 PROCEDURE — 82607 VITAMIN B-12: CPT

## 2021-03-08 PROCEDURE — G0463 HOSPITAL OUTPT CLINIC VISIT: HCPCS | Performed by: INTERNAL MEDICINE

## 2021-03-08 PROCEDURE — 84466 ASSAY OF TRANSFERRIN: CPT

## 2021-03-08 PROCEDURE — 85025 COMPLETE CBC W/AUTO DIFF WBC: CPT

## 2021-03-08 PROCEDURE — 99204 OFFICE O/P NEW MOD 45 MIN: CPT | Performed by: INTERNAL MEDICINE

## 2021-03-08 PROCEDURE — 82746 ASSAY OF FOLIC ACID SERUM: CPT

## 2021-03-08 PROCEDURE — 83540 ASSAY OF IRON: CPT

## 2021-03-08 RX ORDER — DIPHENHYDRAMINE HYDROCHLORIDE 50 MG/ML
50 INJECTION INTRAMUSCULAR; INTRAVENOUS AS NEEDED
Status: CANCELLED | OUTPATIENT
Start: 2021-03-15

## 2021-03-08 RX ORDER — PROCHLORPERAZINE MALEATE 5 MG/1
10 TABLET ORAL ONCE
Status: CANCELLED | OUTPATIENT
Start: 2021-03-15 | End: 2021-03-15

## 2021-03-08 RX ORDER — SODIUM CHLORIDE 9 MG/ML
250 INJECTION, SOLUTION INTRAVENOUS ONCE
Status: CANCELLED | OUTPATIENT
Start: 2021-03-15 | End: 2021-03-15

## 2021-03-08 NOTE — PROGRESS NOTES
REASON FOR CONSULTATION:   Iron deficiency anemia   Provide an opinion on any further workup or treatment                             REQUESTING PHYSICIAN:  Juhi Gordon APRN      RECORDS OBTAINED:  Records of the patients history including those obtained from the referring provider were reviewed and summarized in detail.      History of Present Illness     This is a pleasant 30 year old female who was seen in consultation at the request of Juhi Gordon APRN for evaluation of iron deficiency anemia. Patient had routine laboratory testing performed, which showed iron deficiency anemia - hg of 11.1, MCV of 72.9, ferritin of 11.10, iron saturation  of 7.     She has been taking oral iron supplements however has developed GI side effects.  Denies any active bleeding except for her regular menses which tends to be heavy.  She does not take any antiplatelets or anticoagulants.  No prior history of gastric bypass surgery.  No prior history of inflammatory bowel disease.  Denies any prior history of blood transfusions or IV iron.   No prior history of hematological disorder.   No family history hematological disorder.     I have been asked to evaluate and manage her iron deficiency anemia.         Past Medical History:   Diagnosis Date   • Acute bronchitis    • Anxiety    • Bipolar disease, chronic (CMS/HCC)    • Cough    • Itch of skin    • Mild depression (CMS/HCC)    • Visit for gynecologic examination         Past Surgical History:   Procedure Laterality Date   • ADENOIDECTOMY     •  SECTION     • INJECTION OF MEDICATION  2014    Rocephin(1)   • OOPHORECTOMY      ? R tube and ovary at the time of c/s   • TONSILLECTOMY          Current Outpatient Medications on File Prior to Visit   Medication Sig Dispense Refill   • albuterol sulfate  (90 Base) MCG/ACT inhaler Inhale 2 puffs Every 4 (Four) Hours As Needed for Wheezing or Shortness of Air. 1 inhaler 11   • calcipotriene  (DOVONEX) 0.005 % cream Apply  topically to the appropriate area as directed 2 (Two) Times a Day. 60 g 5   • cholecalciferol (VITAMIN D3) 1.25 MG (54160 UT) capsule Take 1 capsule by mouth 2 (Two) Times a Week. Must have vit D level collected in May when out of refills. 8 capsule 2   • famotidine (PEPCID) 40 MG tablet Take 1 tablet by mouth Daily. 90 tablet 1   • ferrous sulfate 325 (65 FE) MG EC tablet Take 1 tablet by mouth 2 (Two) Times a Day. For low iron. Labs due for repeat on about 3/15/21 60 tablet 0   • HYDROcodone-acetaminophen (NORCO) 7.5-325 MG per tablet Take 1 tablet by mouth 2 (Two) Times a Day As Needed for Moderate Pain  (only when necessary). 45 tablet 0   • metFORMIN (Glucophage) 1000 MG tablet Take 1 tablet by mouth 2 (Two) Times a Day With Meals. 60 tablet 5   • naproxen (NAPROSYN) 500 MG tablet Take 1 tablet by mouth 2 (Two) Times a Day As Needed for Mild Pain  (back pain, menstrual pain). 60 tablet 5   • ondansetron (ZOFRAN) 4 MG tablet Take 1 tablet by mouth Every 8 (Eight) Hours As Needed for Nausea or Vomiting. 60 tablet 3   • orlistat (Salty) 60 MG capsule Take 1 capsule by mouth 3 (Three) Times a Day With Meals. 90 capsule 1   • pantoprazole (PROTONIX) 40 MG EC tablet Take 1 tablet by mouth Daily. For acid reflux 90 tablet 1     No current facility-administered medications on file prior to visit.        ALLERGIES:    Allergies   Allergen Reactions   • Penicillins Anaphylaxis   • Tramadol Anaphylaxis and Rash   • Fluoxetine Nausea Only and Other (See Comments)     Headaches   • Latex Swelling and Hives   • Bupropion Other (See Comments)     Makes her feel bad   • Celexa [Citalopram Hydrobromide] Anxiety   • Codeine Rash   • Cymbalta [Duloxetine Hcl] Anxiety   • Effexor [Venlafaxine] Anxiety   • Geodon [Ziprasidone Hcl] Anxiety   • Lamictal [Lamotrigine] Anxiety   • Latuda [Lurasidone Hcl] Anxiety   • Paxil [Paroxetine Hcl] Anxiety   • Viibryd [Vilazodone Hcl] Anxiety   • Zoloft [Sertraline  "Hcl] Anxiety        Social History     Socioeconomic History   • Marital status:      Spouse name: Not on file   • Number of children: Not on file   • Years of education: Not on file   • Highest education level: Not on file   Tobacco Use   • Smoking status: Current Every Day Smoker     Packs/day: 1.00   • Smokeless tobacco: Never Used   Substance and Sexual Activity   • Alcohol use: No   • Drug use: No   • Sexual activity: Defer        Family History   Problem Relation Age of Onset   • No Known Problems Mother    • Diabetes Father    • Heart disease Father    • Hypertension Maternal Grandmother    • Breast cancer Paternal Grandmother    • Colon cancer Neg Hx    • Endometrial cancer Neg Hx    • Ovarian cancer Neg Hx           Objective     Vitals:    03/08/21 1452   BP: 129/61   Pulse: 113   Resp: 18   Temp: 97.6 °F (36.4 °C)   Weight: 113 kg (248 lb 3.2 oz)   Height: 157.5 cm (62\")   PainSc: 0-No pain     Current Status 3/8/2021   ECOG score 0       Physical Exam  Vitals and nursing note reviewed.   Constitutional:       Appearance: Normal appearance. She is obese.   HENT:      Nose: Nose normal. No congestion.      Mouth/Throat:      Mouth: Mucous membranes are moist.      Pharynx: No oropharyngeal exudate.   Eyes:      General: No scleral icterus.     Extraocular Movements: Extraocular movements intact.      Pupils: Pupils are equal, round, and reactive to light.      Comments: Conjunctival pallor +    Cardiovascular:      Rate and Rhythm: Normal rate and regular rhythm.      Heart sounds: No murmur.   Pulmonary:      Effort: Pulmonary effort is normal. No respiratory distress.      Breath sounds: Normal breath sounds. No stridor. No wheezing.   Abdominal:      General: Bowel sounds are normal. There is no distension.      Palpations: Abdomen is soft. There is no mass.      Tenderness: There is no abdominal tenderness.   Neurological:      General: No focal deficit present.      Mental Status: She is alert " and oriented to person, place, and time. Mental status is at baseline.   Psychiatric:         Mood and Affect: Mood normal.         Behavior: Behavior normal.         Thought Content: Thought content normal.           RECENT LABS:Independently reviewed and summarized  Hematology WBC   Date Value Ref Range Status   03/08/2021 9.06 3.40 - 10.80 10*3/mm3 Final   03/08/2019 8.2 4.0 - 11.0 10*3/uL Final     RBC   Date Value Ref Range Status   03/08/2021 4.81 3.77 - 5.28 10*6/mm3 Final   03/08/2019 4.94 (H) 4.15 - 4.87 10*6/uL Final     Hemoglobin   Date Value Ref Range Status   03/08/2021 10.8 (L) 12.0 - 15.9 g/dL Final   03/08/2019 11.4 (L) 12.7 - 14.7 g/dL Final     Hematocrit   Date Value Ref Range Status   03/08/2021 34.9 34.0 - 46.6 % Final   03/08/2019 37.4 (L) 37.9 - 43.9 % Final     Platelets   Date Value Ref Range Status   03/08/2021 350 140 - 450 10*3/mm3 Final   03/08/2019 288 150 - 450 10*3/uL Final        Lab Results   Component Value Date    GLUCOSE 133 (H) 02/01/2021    BUN 12 02/01/2021    CREATININE 0.69 02/01/2021    EGFRIFNONA 99 02/01/2021    BCR 17.4 02/01/2021    K 3.5 02/01/2021    CO2 24.0 02/01/2021    CALCIUM 8.7 02/01/2021    ALBUMIN 3.80 02/01/2021    AST 20 02/01/2021    ALT 14 02/01/2021       Marizol WATKINS reports a pain score of 0.  Given her pain assessment as noted, treatment options were discussed and the following options were decided upon as a follow-up plan to address the patient's pain: continuation of current treatment plan for pain.    Patient screened positive for depression based on a PHQ-9 score of 3 on 3/8/2021. Follow-up recommendations include: Suicide Risk Assessment performed.      Diagnosis:   (1) Iron deficiency anemia   (2) Iron malabsorption   (3) Menorrhagia   (4) Morbid obesity     All are new diagnosis/problems for me.     Assessment/Plan     (1) Iron deficiency anemia (2) Iron malabsorption     Patient with iron deficiency anemia likely secondary to chronic  menstrual blood loss.   She is taking oral iron, but with GI side effects.   We checked her CBC, ferritin and iron profile today.   This is showing worsening iron deficiency anemia despite of being on oral iron supplements.   Due to this reason I believe she could benefit from IV iron.      I had an extensive discussion with patient about diagnosis and treatment options. I recommend that we replace their iron with IV Injectafer 750 mg, 2 infusions, one week apart. I also recommend that we should check iron studies every 3 months after this initial replacement and consider IV iron treatment for ferritin drops to less than 50 or transference saturation drops to less than 20%.  In addition, she should also be checked for vitamin B12 level at least twice a year.  She should be on oral multivitamin and mineral supplements which are available over-the-counter.    I had an extensive discussion with the patient about risk versus benefits of IV iron treatment.    I discussed about various risks associated with IV iron such as allergic reaction, hypersensitivity reaction, headache, flushing, joint aches or pains, local IV infiltration and skin discoloration.  After our discussion the patient was in agreement in  proceeding with IV iron treatment for  anemia.      (3) Menorrhagia   Heavy menses likely causing iron deficiency anemia.   Recommend gynecology referral.     (4) Morbid obesity   Body mass index is 45.4 kg/m².  Counseled about diet, exercise and weight loss.

## 2021-03-09 ENCOUNTER — TELEPHONE (OUTPATIENT)
Dept: ONCOLOGY | Facility: HOSPITAL | Age: 31
End: 2021-03-09

## 2021-03-09 NOTE — TELEPHONE ENCOUNTER
Informed patient of information.   Gave date/time of appointments.  She verbalized understanding.

## 2021-03-09 NOTE — TELEPHONE ENCOUNTER
----- Message from Bell Maguire MD sent at 3/8/2021  4:03 PM CST -----  Arrange for 2 injectofer infusions.

## 2021-03-15 ENCOUNTER — INFUSION (OUTPATIENT)
Dept: ONCOLOGY | Facility: HOSPITAL | Age: 31
End: 2021-03-15

## 2021-03-15 VITALS
DIASTOLIC BLOOD PRESSURE: 69 MMHG | SYSTOLIC BLOOD PRESSURE: 133 MMHG | RESPIRATION RATE: 18 BRPM | TEMPERATURE: 97.4 F | HEART RATE: 106 BPM

## 2021-03-15 DIAGNOSIS — D50.9 IRON DEFICIENCY ANEMIA, UNSPECIFIED IRON DEFICIENCY ANEMIA TYPE: Primary | ICD-10-CM

## 2021-03-15 DIAGNOSIS — K90.9 IRON MALABSORPTION: ICD-10-CM

## 2021-03-15 PROCEDURE — 25010000002 FERRIC CARBOXYMALTOSE 750 MG/15ML SOLUTION 15 ML VIAL: Performed by: INTERNAL MEDICINE

## 2021-03-15 PROCEDURE — 96374 THER/PROPH/DIAG INJ IV PUSH: CPT | Performed by: INTERNAL MEDICINE

## 2021-03-15 PROCEDURE — 63710000001 PROCHLORPERAZINE MALEATE PER 10 MG: Performed by: INTERNAL MEDICINE

## 2021-03-15 RX ORDER — DIPHENHYDRAMINE HYDROCHLORIDE 50 MG/ML
50 INJECTION INTRAMUSCULAR; INTRAVENOUS AS NEEDED
Status: CANCELLED | OUTPATIENT
Start: 2021-03-22

## 2021-03-15 RX ORDER — SODIUM CHLORIDE 9 MG/ML
250 INJECTION, SOLUTION INTRAVENOUS ONCE
Status: COMPLETED | OUTPATIENT
Start: 2021-03-15 | End: 2021-03-15

## 2021-03-15 RX ORDER — METHYLPREDNISOLONE SODIUM SUCCINATE 125 MG/2ML
125 INJECTION, POWDER, LYOPHILIZED, FOR SOLUTION INTRAMUSCULAR; INTRAVENOUS AS NEEDED
Status: DISCONTINUED | OUTPATIENT
Start: 2021-03-15 | End: 2021-03-15 | Stop reason: HOSPADM

## 2021-03-15 RX ORDER — PROCHLORPERAZINE MALEATE 10 MG
10 TABLET ORAL ONCE
Status: COMPLETED | OUTPATIENT
Start: 2021-03-15 | End: 2021-03-15

## 2021-03-15 RX ORDER — DIPHENHYDRAMINE HYDROCHLORIDE 50 MG/ML
50 INJECTION INTRAMUSCULAR; INTRAVENOUS AS NEEDED
Status: DISCONTINUED | OUTPATIENT
Start: 2021-03-15 | End: 2021-03-15 | Stop reason: HOSPADM

## 2021-03-15 RX ORDER — SODIUM CHLORIDE 9 MG/ML
250 INJECTION, SOLUTION INTRAVENOUS ONCE
Status: CANCELLED | OUTPATIENT
Start: 2021-03-22 | End: 2021-03-22

## 2021-03-15 RX ORDER — METHYLPREDNISOLONE SODIUM SUCCINATE 125 MG/2ML
125 INJECTION, POWDER, LYOPHILIZED, FOR SOLUTION INTRAMUSCULAR; INTRAVENOUS AS NEEDED
Status: CANCELLED | OUTPATIENT
Start: 2021-03-22

## 2021-03-15 RX ORDER — PROCHLORPERAZINE MALEATE 10 MG
10 TABLET ORAL ONCE
Status: CANCELLED | OUTPATIENT
Start: 2021-03-22 | End: 2021-03-22

## 2021-03-15 RX ADMIN — SODIUM CHLORIDE 250 ML: 9 INJECTION, SOLUTION INTRAVENOUS at 14:47

## 2021-03-15 RX ADMIN — PROCHLORPERAZINE MALEATE 10 MG: 10 TABLET ORAL at 14:46

## 2021-03-15 RX ADMIN — FERRIC CARBOXYMALTOSE INJECTION 750 MG: 50 INJECTION, SOLUTION INTRAVENOUS at 15:05

## 2021-03-22 ENCOUNTER — INFUSION (OUTPATIENT)
Dept: ONCOLOGY | Facility: HOSPITAL | Age: 31
End: 2021-03-22

## 2021-03-22 VITALS
DIASTOLIC BLOOD PRESSURE: 67 MMHG | TEMPERATURE: 97.7 F | HEART RATE: 92 BPM | SYSTOLIC BLOOD PRESSURE: 115 MMHG | RESPIRATION RATE: 18 BRPM

## 2021-03-22 DIAGNOSIS — D50.9 IRON DEFICIENCY ANEMIA, UNSPECIFIED IRON DEFICIENCY ANEMIA TYPE: Primary | ICD-10-CM

## 2021-03-22 DIAGNOSIS — K90.9 IRON MALABSORPTION: ICD-10-CM

## 2021-03-22 PROCEDURE — 25010000002 FERRIC CARBOXYMALTOSE 750 MG/15ML SOLUTION 15 ML VIAL: Performed by: INTERNAL MEDICINE

## 2021-03-22 PROCEDURE — 96374 THER/PROPH/DIAG INJ IV PUSH: CPT | Performed by: INTERNAL MEDICINE

## 2021-03-22 PROCEDURE — 63710000001 PROCHLORPERAZINE MALEATE PER 10 MG: Performed by: INTERNAL MEDICINE

## 2021-03-22 RX ORDER — PROCHLORPERAZINE MALEATE 10 MG
10 TABLET ORAL ONCE
Status: COMPLETED | OUTPATIENT
Start: 2021-03-22 | End: 2021-03-22

## 2021-03-22 RX ORDER — DIPHENHYDRAMINE HYDROCHLORIDE 50 MG/ML
50 INJECTION INTRAMUSCULAR; INTRAVENOUS AS NEEDED
Status: CANCELLED | OUTPATIENT
Start: 2021-03-22

## 2021-03-22 RX ORDER — METHYLPREDNISOLONE SODIUM SUCCINATE 125 MG/2ML
125 INJECTION, POWDER, LYOPHILIZED, FOR SOLUTION INTRAMUSCULAR; INTRAVENOUS AS NEEDED
Status: DISCONTINUED | OUTPATIENT
Start: 2021-03-22 | End: 2021-03-22 | Stop reason: HOSPADM

## 2021-03-22 RX ORDER — METHYLPREDNISOLONE SODIUM SUCCINATE 125 MG/2ML
125 INJECTION, POWDER, LYOPHILIZED, FOR SOLUTION INTRAMUSCULAR; INTRAVENOUS AS NEEDED
Status: CANCELLED | OUTPATIENT
Start: 2021-03-22

## 2021-03-22 RX ORDER — SODIUM CHLORIDE 9 MG/ML
250 INJECTION, SOLUTION INTRAVENOUS ONCE
Status: CANCELLED | OUTPATIENT
Start: 2021-03-22 | End: 2021-03-22

## 2021-03-22 RX ORDER — PROCHLORPERAZINE MALEATE 10 MG
10 TABLET ORAL ONCE
Status: CANCELLED | OUTPATIENT
Start: 2021-03-22 | End: 2021-03-22

## 2021-03-22 RX ORDER — SODIUM CHLORIDE 9 MG/ML
250 INJECTION, SOLUTION INTRAVENOUS ONCE
Status: COMPLETED | OUTPATIENT
Start: 2021-03-22 | End: 2021-03-22

## 2021-03-22 RX ORDER — DIPHENHYDRAMINE HYDROCHLORIDE 50 MG/ML
50 INJECTION INTRAMUSCULAR; INTRAVENOUS AS NEEDED
Status: DISCONTINUED | OUTPATIENT
Start: 2021-03-22 | End: 2021-03-22 | Stop reason: HOSPADM

## 2021-03-22 RX ADMIN — SODIUM CHLORIDE 250 ML: 9 INJECTION, SOLUTION INTRAVENOUS at 10:56

## 2021-03-22 RX ADMIN — FERRIC CARBOXYMALTOSE INJECTION 750 MG: 50 INJECTION, SOLUTION INTRAVENOUS at 11:13

## 2021-03-22 RX ADMIN — PROCHLORPERAZINE MALEATE 10 MG: 10 TABLET ORAL at 10:56

## 2021-03-25 ENCOUNTER — TELEPHONE (OUTPATIENT)
Dept: FAMILY MEDICINE CLINIC | Facility: CLINIC | Age: 31
End: 2021-03-25

## 2021-03-25 DIAGNOSIS — R11.0 NAUSEA: ICD-10-CM

## 2021-03-25 DIAGNOSIS — G89.29 CHRONIC BILATERAL LOW BACK PAIN WITH BILATERAL SCIATICA: ICD-10-CM

## 2021-03-25 DIAGNOSIS — M54.42 CHRONIC BILATERAL LOW BACK PAIN WITH BILATERAL SCIATICA: ICD-10-CM

## 2021-03-25 DIAGNOSIS — E16.1 HYPERINSULINEMIA: ICD-10-CM

## 2021-03-25 DIAGNOSIS — M54.41 CHRONIC BILATERAL LOW BACK PAIN WITH BILATERAL SCIATICA: ICD-10-CM

## 2021-03-25 DIAGNOSIS — K21.9 GASTROESOPHAGEAL REFLUX DISEASE: ICD-10-CM

## 2021-03-25 DIAGNOSIS — M54.2 CHRONIC MIDLINE POSTERIOR NECK PAIN: ICD-10-CM

## 2021-03-25 DIAGNOSIS — G89.29 CHRONIC MIDLINE POSTERIOR NECK PAIN: ICD-10-CM

## 2021-03-25 RX ORDER — HYDROCODONE BITARTRATE AND ACETAMINOPHEN 7.5; 325 MG/1; MG/1
1 TABLET ORAL 2 TIMES DAILY PRN
Qty: 45 TABLET | Refills: 0 | Status: SHIPPED | OUTPATIENT
Start: 2021-03-25 | End: 2021-04-26 | Stop reason: SDUPTHER

## 2021-03-25 RX ORDER — PANTOPRAZOLE SODIUM 40 MG/1
40 TABLET, DELAYED RELEASE ORAL DAILY
Qty: 90 TABLET | Refills: 1 | Status: SHIPPED | OUTPATIENT
Start: 2021-03-25 | End: 2021-08-17 | Stop reason: SDUPTHER

## 2021-03-25 RX ORDER — ONDANSETRON 4 MG/1
4 TABLET, FILM COATED ORAL EVERY 8 HOURS PRN
Qty: 60 TABLET | Refills: 3 | Status: SHIPPED | OUTPATIENT
Start: 2021-03-25 | End: 2021-08-17 | Stop reason: SDUPTHER

## 2021-03-25 NOTE — TELEPHONE ENCOUNTER
Patient seen in office every 3 months for chronic pain requiring opiate pain medication. Compliant with medication, visits with no adverse effects noted. FILEMON and UDS current and appropriate. Patient called requesting scheduled refill at appropriate interval. Patient understands the risks associated with this controlled medication, including tolerance and addiction.  Patient also agrees to only obtain this medication from me, and not from a another provider, unless that provider is covering for me in my absence.  Patient also agrees to be compliant in dosing, and not self adjust the dose of medication.  A signed controlled substance agreement is on file, and the patient has received a controlled substance education sheet at this a previous visit.  The patient has also signed a consent for treatment with a controlled substance as per [facility] policy. FILEMON was obtained.   Refill sent for hydrocodone.     This document has been electronically signed by GIL Darling on March 25, 2021 13:28 CDT

## 2021-03-25 NOTE — TELEPHONE ENCOUNTER
----- Message from GIL Lagos sent at 3/25/2021 10:44 AM CDT -----  No It doesn't work so stop it. Take it off her med list  ----- Message -----  From: Andreia Alvares MA  Sent: 3/25/2021   9:38 AM CDT  To: GIL Lagos    Patient wants to know if she still needs to take her iron medicine since she has had two infusions

## 2021-04-05 ENCOUNTER — TELEMEDICINE (OUTPATIENT)
Dept: FAMILY MEDICINE CLINIC | Facility: CLINIC | Age: 31
End: 2021-04-05

## 2021-04-05 DIAGNOSIS — F17.200 SMOKER UNMOTIVATED TO QUIT: Primary | Chronic | ICD-10-CM

## 2021-04-05 DIAGNOSIS — E55.9 VITAMIN D DEFICIENCY: ICD-10-CM

## 2021-04-05 DIAGNOSIS — M54.41 CHRONIC BILATERAL LOW BACK PAIN WITH BILATERAL SCIATICA: Chronic | ICD-10-CM

## 2021-04-05 DIAGNOSIS — M54.2 CHRONIC MIDLINE POSTERIOR NECK PAIN: Chronic | ICD-10-CM

## 2021-04-05 DIAGNOSIS — R73.03 PREDIABETES: ICD-10-CM

## 2021-04-05 DIAGNOSIS — E16.1 HYPERINSULINEMIA: ICD-10-CM

## 2021-04-05 DIAGNOSIS — E88.81 INSULIN RESISTANCE: ICD-10-CM

## 2021-04-05 DIAGNOSIS — D50.0 IRON DEFICIENCY ANEMIA DUE TO CHRONIC BLOOD LOSS: ICD-10-CM

## 2021-04-05 DIAGNOSIS — Z51.81 ENCOUNTER FOR THERAPEUTIC DRUG LEVEL MONITORING: ICD-10-CM

## 2021-04-05 DIAGNOSIS — E78.1 HYPERTRIGLYCERIDEMIA: ICD-10-CM

## 2021-04-05 DIAGNOSIS — G89.29 CHRONIC BILATERAL LOW BACK PAIN WITH BILATERAL SCIATICA: Chronic | ICD-10-CM

## 2021-04-05 DIAGNOSIS — M54.42 CHRONIC BILATERAL LOW BACK PAIN WITH BILATERAL SCIATICA: Chronic | ICD-10-CM

## 2021-04-05 DIAGNOSIS — G89.29 CHRONIC MIDLINE POSTERIOR NECK PAIN: Chronic | ICD-10-CM

## 2021-04-05 PROCEDURE — 99214 OFFICE O/P EST MOD 30 MIN: CPT | Performed by: NURSE PRACTITIONER

## 2021-04-05 NOTE — PROGRESS NOTES
Subjective   Marizol WATKINS is a 30 y.o. female.   You have chosen to receive care through a telehealth visit.  Do you consent to use a video/audio connection for your medical care today? Yes  This was an audio and video enabled telemedicine encounter.        Chief Complaint   Patient presents with   • Back Pain     12 WEEKS F/U        History of Present Illness     Patient presents for routine 12-week follow-up of chronic lower back pain chronic neck pain secondary to degenerative disc disease of the cervical and lumbar spine.  Pain is managed with hydrocodone with good results reported.  Compliant with use.  No adverse medication effects reported.  Not due for refill for several weeks.  Will call closer to time due.    Current medication continues to allow her more active participation in ADLs and home care and in the care of her child.    Since most recent visit she has seen hematology for iron deficiency anemia with intolerance to oral iron, and has received 2 Injectafer infusions.  She reports having much more energy and feeling much better since the iron infusions.  She has a follow-up scheduled for this week.  Labs are current will need labs in May for routine monitoring of chronic conditions and for routine monitoring of effects of daily medications.    No other complaints today.    Parts of most recent relevant visit HPI, ROS  and PE may be carried forward and all are updated as appropriate for current situation.    Past Surgical History:   Procedure Laterality Date   • ADENOIDECTOMY     •  SECTION     • INJECTION OF MEDICATION  2014    Rocephin(1)   • OOPHORECTOMY      ? R tube and ovary at the time of c/s   • TONSILLECTOMY        Social History     Socioeconomic History   • Marital status:      Spouse name: Not on file   • Number of children: Not on file   • Years of education: Not on file   • Highest education level: Not on file   Tobacco Use   • Smoking status: Current  Every Day Smoker     Packs/day: 1.00   • Smokeless tobacco: Never Used   Substance and Sexual Activity   • Alcohol use: No   • Drug use: No   • Sexual activity: Defer      The following portions of the patient's history were reviewed and updated as appropriate: She  has a past medical history of Acute bronchitis, Anxiety, Bipolar disease, chronic (CMS/HCC), Cough, Itch of skin, Mild depression (CMS/HCC), and Visit for gynecologic examination..    Review of Systems   Constitutional: Negative.    HENT: Negative.  Negative for congestion.    Eyes: Negative.  Negative for blurred vision, double vision, photophobia and visual disturbance.   Respiratory: Negative.  Negative for cough, shortness of breath, wheezing and stridor.    Cardiovascular: Negative.  Negative for chest pain, palpitations and leg swelling.   Gastrointestinal: Negative.  Negative for abdominal distention, abdominal pain, blood in stool, constipation, diarrhea, nausea, vomiting, GERD and indigestion.   Endocrine: Negative.  Negative for cold intolerance and heat intolerance.   Genitourinary: Negative.  Negative for dysuria, flank pain, frequency and urinary incontinence.   Musculoskeletal: Positive for back pain and neck pain. Negative for arthralgias.   Skin: Negative.    Allergic/Immunologic: Negative.  Negative for immunocompromised state.   Neurological: Negative.    Hematological: Negative.    Psychiatric/Behavioral: Negative.  Negative for agitation, behavioral problems, decreased concentration, dysphoric mood, hallucinations, self-injury, sleep disturbance, suicidal ideas, negative for hyperactivity, depressed mood and stress. The patient is not nervous/anxious.    All other systems reviewed and are negative.    PHQ-9 Depression Screening  Little interest or pleasure in doing things?     Feeling down, depressed, or hopeless?     Trouble falling or staying asleep, or sleeping too much?     Feeling tired or having little energy?     Poor appetite  or overeating?     Feeling bad about yourself - or that you are a failure or have let yourself or your family down?     Trouble concentrating on things, such as reading the newspaper or watching television?     Moving or speaking so slowly that other people could have noticed? Or the opposite - being so fidgety or restless that you have been moving around a lot more than usual?     Thoughts that you would be better off dead, or of hurting yourself in some way?     PHQ-9 Total Score     If you checked off any problems, how difficult have these problems made it for you to do your work, take care of things at home, or get along with other people?      Patient understands the risks associated with this controlled medication, including tolerance and addiction.  Patient also agrees to only obtain this medication from me, and not from a another provider, unless that provider is covering for me in my absence.  Patient also agrees to be compliant in dosing, and not self adjust the dose of medication.  A signed controlled substance agreement is on file, and the patient has received a controlled substance education sheet at this a previous visit.  The patient has also signed a consent for treatment with a controlled substance as per [facility] policy. FILEMON was obtained.   Objective    Vitals:    04/05/21 1630   PainSc:   4   PainLoc: Back         Physical Exam  Vitals and nursing note reviewed.   Constitutional:       General: She is not in acute distress.     Appearance: Normal appearance. She is well-developed. She is obese. She is not ill-appearing or diaphoretic.   HENT:      Head: Normocephalic and atraumatic.   Eyes:      General: No scleral icterus.        Right eye: No discharge.         Left eye: No discharge.      Conjunctiva/sclera: Conjunctivae normal.      Pupils: Pupils are equal, round, and reactive to light.   Neck:      Trachea: No tracheal deviation.   Pulmonary:      Effort: Pulmonary effort is normal.  No respiratory distress.      Breath sounds: No stridor. No wheezing.   Musculoskeletal:         General: No tenderness or deformity. Normal range of motion.      Cervical back: Normal range of motion and neck supple.   Skin:     General: Skin is dry.      Coloration: Skin is not pale.      Findings: No erythema or rash.   Neurological:      General: No focal deficit present.      Mental Status: She is alert and oriented to person, place, and time. Mental status is at baseline.      Cranial Nerves: No cranial nerve deficit.   Psychiatric:         Mood and Affect: Mood normal.         Behavior: Behavior normal.         Thought Content: Thought content normal.     Labs collected on 3/8/2021 ordered by Dr. Maguire, hematology.  Iron 30, iron pro, iron saturation 6%, transferrin 332, TIBC 495.  Ordered and managed by hematology.  Same date vitamin B12 level 404 low normal.  If still low in 3 months we will discuss medications.  3/8/2021 also collected CBC with hemoglobin 10.8/hematocrit 34.9.  MCV 72.6/MCH 22.5/MCHC 30.9/RDW 15.9.  Consistent with known iron deficiency anemia and since then has received 2 Injectafer infusions.  Managed by Dr. Maguire, hematology. discussed with patient, who feels much better after infusion. she si scheduled to see him in follow up on 5/3/2021.      Assessment/Plan   Diagnoses and all orders for this visit:    1. Smoker unmotivated to quit (Primary)  Comments:  Not ready to quit counseling provided 4/5/2021.    2. Chronic bilateral low back pain with bilateral sciatica  Comments:  Stable 4/5/2021.  Continue current medications.  Not due for refill hydrocodone yet.    3. Chronic midline posterior neck pain  Comments:  Stable 4/5/2021.  Continue current medications.  Not due for refill hydrocodone today.    4. Prediabetes  -     Hemoglobin A1c; Future  -     Comprehensive Metabolic Panel; Future  -     Insulin, Free & Total, Serum; Future    5. Vitamin D deficiency  -     Vitamin D 25  Hydroxy; Future    6. Insulin resistance  -     Insulin, Free & Total, Serum; Future    7. Encounter for therapeutic drug level monitoring  -     ToxASSURE Select 13 Discrete -; Future    8. Hypertriglyceridemia  -     Lipid Panel; Future    9. Hyperinsulinemia  -     Hemoglobin A1c; Future  -     Comprehensive Metabolic Panel; Future  -     Insulin, Free & Total, Serum; Future    10. Iron deficiency anemia due to chronic blood loss  -     CBC & Differential; Future    Return in about 12 weeks (around 6/28/2021).  Labs due in May               This document has been electronically signed by GIL Darling on April 5, 2021 19:17 CDT

## 2021-04-26 ENCOUNTER — TELEPHONE (OUTPATIENT)
Dept: FAMILY MEDICINE CLINIC | Facility: CLINIC | Age: 31
End: 2021-04-26

## 2021-04-26 DIAGNOSIS — M54.42 CHRONIC BILATERAL LOW BACK PAIN WITH BILATERAL SCIATICA: ICD-10-CM

## 2021-04-26 DIAGNOSIS — G89.29 CHRONIC BILATERAL LOW BACK PAIN WITH BILATERAL SCIATICA: ICD-10-CM

## 2021-04-26 DIAGNOSIS — G89.29 CHRONIC MIDLINE POSTERIOR NECK PAIN: ICD-10-CM

## 2021-04-26 DIAGNOSIS — M54.41 CHRONIC BILATERAL LOW BACK PAIN WITH BILATERAL SCIATICA: ICD-10-CM

## 2021-04-26 DIAGNOSIS — M54.2 CHRONIC MIDLINE POSTERIOR NECK PAIN: ICD-10-CM

## 2021-04-26 RX ORDER — HYDROCODONE BITARTRATE AND ACETAMINOPHEN 7.5; 325 MG/1; MG/1
1 TABLET ORAL 2 TIMES DAILY PRN
Qty: 45 TABLET | Refills: 0 | Status: SHIPPED | OUTPATIENT
Start: 2021-04-26 | End: 2021-06-07 | Stop reason: SDUPTHER

## 2021-04-27 NOTE — TELEPHONE ENCOUNTER
Patient seen in office every 3 months for chronic pain requiring opiate pain medication. Compliant with medication, visits with no adverse effects noted. FILEMON and UDS current and appropriate. Patient called requesting scheduled refill at appropriate interval. Patient understands the risks associated with this controlled medication, including tolerance and addiction.  Patient also agrees to only obtain this medication from me, and not from a another provider, unless that provider is covering for me in my absence.  Patient also agrees to be compliant in dosing, and not self adjust the dose of medication.  A signed controlled substance agreement is on file, and the patient has received a controlled substance education sheet at this a previous visit.  The patient has also signed a consent for treatment with a controlled substance as per [facility] policy. FILEMON was obtained.   Refill sent for hydrocodone.     This document has been electronically signed by GIL Darling on April 26, 2021 19:41 CDT

## 2021-04-28 DIAGNOSIS — D50.9 IRON DEFICIENCY ANEMIA, UNSPECIFIED IRON DEFICIENCY ANEMIA TYPE: Primary | ICD-10-CM

## 2021-05-03 ENCOUNTER — LAB (OUTPATIENT)
Dept: ONCOLOGY | Facility: HOSPITAL | Age: 31
End: 2021-05-03

## 2021-05-03 ENCOUNTER — OFFICE VISIT (OUTPATIENT)
Dept: ONCOLOGY | Facility: CLINIC | Age: 31
End: 2021-05-03

## 2021-05-03 VITALS
BODY MASS INDEX: 45.78 KG/M2 | HEART RATE: 91 BPM | RESPIRATION RATE: 18 BRPM | SYSTOLIC BLOOD PRESSURE: 115 MMHG | DIASTOLIC BLOOD PRESSURE: 78 MMHG | HEIGHT: 62 IN | TEMPERATURE: 97.8 F | WEIGHT: 248.8 LBS

## 2021-05-03 DIAGNOSIS — J45.20 MILD INTERMITTENT ASTHMA WITHOUT COMPLICATION: ICD-10-CM

## 2021-05-03 DIAGNOSIS — G89.29 CHRONIC BILATERAL LOW BACK PAIN WITH BILATERAL SCIATICA: ICD-10-CM

## 2021-05-03 DIAGNOSIS — M54.2 CHRONIC MIDLINE POSTERIOR NECK PAIN: ICD-10-CM

## 2021-05-03 DIAGNOSIS — D50.9 IRON DEFICIENCY ANEMIA, UNSPECIFIED IRON DEFICIENCY ANEMIA TYPE: ICD-10-CM

## 2021-05-03 DIAGNOSIS — M54.41 CHRONIC BILATERAL LOW BACK PAIN WITH BILATERAL SCIATICA: ICD-10-CM

## 2021-05-03 DIAGNOSIS — N92.0 MENORRHAGIA WITH REGULAR CYCLE: Primary | ICD-10-CM

## 2021-05-03 DIAGNOSIS — G89.29 CHRONIC MIDLINE POSTERIOR NECK PAIN: ICD-10-CM

## 2021-05-03 DIAGNOSIS — M54.42 CHRONIC BILATERAL LOW BACK PAIN WITH BILATERAL SCIATICA: ICD-10-CM

## 2021-05-03 LAB
BASOPHILS # BLD AUTO: 0.05 10*3/MM3 (ref 0–0.2)
BASOPHILS NFR BLD AUTO: 0.5 % (ref 0–1.5)
DEPRECATED RDW RBC AUTO: 61.8 FL (ref 37–54)
EOSINOPHIL # BLD AUTO: 0.27 10*3/MM3 (ref 0–0.4)
EOSINOPHIL NFR BLD AUTO: 2.7 % (ref 0.3–6.2)
ERYTHROCYTE [DISTWIDTH] IN BLOOD BY AUTOMATED COUNT: 21.8 % (ref 12.3–15.4)
FERRITIN SERPL-MCNC: 181.2 NG/ML (ref 13–150)
FOLATE SERPL-MCNC: 7.32 NG/ML (ref 4.78–24.2)
HCT VFR BLD AUTO: 40.7 % (ref 34–46.6)
HGB BLD-MCNC: 13.6 G/DL (ref 12–15.9)
IMM GRANULOCYTES # BLD AUTO: 0.04 10*3/MM3 (ref 0–0.05)
IMM GRANULOCYTES NFR BLD AUTO: 0.4 % (ref 0–0.5)
IRON 24H UR-MRATE: 57 MCG/DL (ref 37–145)
IRON SATN MFR SERPL: 16 % (ref 20–50)
LYMPHOCYTES # BLD AUTO: 2.56 10*3/MM3 (ref 0.7–3.1)
LYMPHOCYTES NFR BLD AUTO: 25.9 % (ref 19.6–45.3)
MCH RBC QN AUTO: 27.3 PG (ref 26.6–33)
MCHC RBC AUTO-ENTMCNC: 33.4 G/DL (ref 31.5–35.7)
MCV RBC AUTO: 81.7 FL (ref 79–97)
MONOCYTES # BLD AUTO: 0.58 10*3/MM3 (ref 0.1–0.9)
MONOCYTES NFR BLD AUTO: 5.9 % (ref 5–12)
NEUTROPHILS NFR BLD AUTO: 6.37 10*3/MM3 (ref 1.7–7)
NEUTROPHILS NFR BLD AUTO: 64.6 % (ref 42.7–76)
NRBC BLD AUTO-RTO: 0 /100 WBC (ref 0–0.2)
PLATELET # BLD AUTO: 283 10*3/MM3 (ref 140–450)
PMV BLD AUTO: 9.2 FL (ref 6–12)
RBC # BLD AUTO: 4.98 10*6/MM3 (ref 3.77–5.28)
TIBC SERPL-MCNC: 361 MCG/DL (ref 298–536)
TRANSFERRIN SERPL-MCNC: 242 MG/DL (ref 200–360)
VIT B12 BLD-MCNC: 378 PG/ML (ref 211–946)
WBC # BLD AUTO: 9.87 10*3/MM3 (ref 3.4–10.8)

## 2021-05-03 PROCEDURE — 85025 COMPLETE CBC W/AUTO DIFF WBC: CPT

## 2021-05-03 PROCEDURE — 83540 ASSAY OF IRON: CPT

## 2021-05-03 PROCEDURE — 82607 VITAMIN B-12: CPT

## 2021-05-03 PROCEDURE — 99212 OFFICE O/P EST SF 10 MIN: CPT | Performed by: NURSE PRACTITIONER

## 2021-05-03 PROCEDURE — 82746 ASSAY OF FOLIC ACID SERUM: CPT

## 2021-05-03 PROCEDURE — 82728 ASSAY OF FERRITIN: CPT

## 2021-05-03 PROCEDURE — G0463 HOSPITAL OUTPT CLINIC VISIT: HCPCS | Performed by: NURSE PRACTITIONER

## 2021-05-03 PROCEDURE — 84466 ASSAY OF TRANSFERRIN: CPT

## 2021-05-03 RX ORDER — ALBUTEROL SULFATE 90 UG/1
2 AEROSOL, METERED RESPIRATORY (INHALATION) EVERY 4 HOURS PRN
Qty: 1 G | Refills: 11 | Status: SHIPPED | OUTPATIENT
Start: 2021-05-03 | End: 2021-05-11

## 2021-05-03 RX ORDER — NAPROXEN 500 MG/1
500 TABLET ORAL 2 TIMES DAILY PRN
Qty: 60 TABLET | Refills: 5 | Status: SHIPPED | OUTPATIENT
Start: 2021-05-03 | End: 2022-02-23 | Stop reason: SDUPTHER

## 2021-05-05 ENCOUNTER — PRIOR AUTHORIZATION (OUTPATIENT)
Dept: FAMILY MEDICINE CLINIC | Facility: CLINIC | Age: 31
End: 2021-05-05

## 2021-05-05 NOTE — TELEPHONE ENCOUNTER
Marizol Elkins   [id number]   Albuterol sulfate  The brand product is preferred over the generic product. Also, the brand product may require an additional authorization for other utilization management.

## 2021-05-06 ENCOUNTER — TELEPHONE (OUTPATIENT)
Dept: ONCOLOGY | Facility: HOSPITAL | Age: 31
End: 2021-05-06

## 2021-05-06 RX ORDER — FOLIC ACID 1 MG/1
1 TABLET ORAL DAILY
Qty: 90 TABLET | Refills: 1 | Status: SHIPPED | OUTPATIENT
Start: 2021-05-06 | End: 2021-07-19

## 2021-05-06 RX ORDER — LANOLIN ALCOHOL/MO/W.PET/CERES
1000 CREAM (GRAM) TOPICAL DAILY
Qty: 90 TABLET | Refills: 1 | Status: SHIPPED | OUTPATIENT
Start: 2021-05-06 | End: 2021-11-23

## 2021-05-06 NOTE — PROGRESS NOTES
"DATE OF VISIT: 5/3/67520            HISTORY OF PRESENT ILLNESS:   Pt being seen today in Dr. Alexander absence  The patient was seen in March in consultation by Dr. Alexander for anemia; it was found that she had iron deficiency anemia most likely to menstural blood loss; she was given IV iron due to inability to tolerate PO iron.  She is here today to reassess her iron levels.    Past Medical History, Past Surgical History, Social History, Family History have been reviewed and are without significant changes except as mentioned.    Review of Systems   A comprehensive 14 point review of systems was performed and was negative except as mentioned.    Medications:  The current medication list was reviewed in the EMR    ALLERGIES:    Allergies   Allergen Reactions   • Penicillins Anaphylaxis   • Tramadol Anaphylaxis and Rash   • Fluoxetine Nausea Only and Other (See Comments)     Headaches   • Latex Swelling and Hives   • Bupropion Other (See Comments)     Makes her feel bad   • Celexa [Citalopram Hydrobromide] Anxiety   • Codeine Rash   • Cymbalta [Duloxetine Hcl] Anxiety   • Effexor [Venlafaxine] Anxiety   • Geodon [Ziprasidone Hcl] Anxiety   • Lamictal [Lamotrigine] Anxiety   • Latuda [Lurasidone Hcl] Anxiety   • Paxil [Paroxetine Hcl] Anxiety   • Viibryd [Vilazodone Hcl] Anxiety   • Zoloft [Sertraline Hcl] Anxiety       Objective      Vitals:    05/03/21 1324   BP: 115/78   Pulse: 91   Resp: 18   Temp: 97.8 °F (36.6 °C)   TempSrc: Temporal   Weight: 113 kg (248 lb 12.8 oz)   Height: 157.5 cm (62\")   PainSc: 0-No pain     Current Status 5/3/2021   ECOG score 0       Physical Exam    Vitals and nursing note reviewed.   Constitutional:       Appearance: Normal appearance.   HENT:      Nose: Nose normal. No congestion.      Mouth/Throat:      Mouth: Mucous membranes are moist.      Pharynx: No oropharyngeal exudate.   Eyes:      General: No scleral icterus.     Extraocular Movements: Extraocular movements intact.      Pupils: " Pupils are equal, round, and reactive to light.        Cardiovascular:      Rate and Rhythm: Normal rate and regular rhythm.      Heart sounds: No murmur.   Pulmonary:      Effort: Pulmonary effort is normal. No respiratory distress.      Breath sounds: Normal breath sounds. No stridor. No wheezing.   Abdominal:      General: Bowel sounds are normal. There is no distension.      Palpations: Abdomen is soft. There is no mass.      Tenderness: There is no abdominal tenderness.   Neurological:      General: No focal deficit present.      Mental Status: She is alert and oriented to person, place, and time. Mental status is at baseline.   Psychiatric:         Mood and Affect: Mood normal.         Behavior: Behavior normal.         Thought Content: Thought content normal.      RECENT LABS:  Glucose   Date Value Ref Range Status   02/01/2021 133 (H) 70 - 99 mg/dL Final     Sodium   Date Value Ref Range Status   02/01/2021 137 137 - 145 mmol/L Final     Potassium   Date Value Ref Range Status   02/01/2021 3.5 3.4 - 5.0 mmol/L Final     CO2   Date Value Ref Range Status   02/01/2021 24.0 22.0 - 30.0 mmol/L Final     Chloride   Date Value Ref Range Status   02/01/2021 104 101 - 112 mmol/L Final     Anion Gap   Date Value Ref Range Status   02/01/2021 9.0 5.0 - 15.0 mmol/L Final     Creatinine   Date Value Ref Range Status   02/01/2021 0.69 0.52 - 1.04 mg/dL Final     BUN   Date Value Ref Range Status   02/01/2021 12 7 - 23 mg/dL Final     BUN/Creatinine Ratio   Date Value Ref Range Status   02/01/2021 17.4 7.0 - 25.0 Final     Calcium   Date Value Ref Range Status   02/01/2021 8.7 8.4 - 10.2 mg/dL Final     eGFR Non  Amer   Date Value Ref Range Status   02/01/2021 99 64 - 149 mL/min/1.73 Final     Alkaline Phosphatase   Date Value Ref Range Status   02/01/2021 56 38 - 126 U/L Final     Total Protein   Date Value Ref Range Status   02/01/2021 6.9 6.3 - 8.6 g/dL Final     ALT (SGPT)   Date Value Ref Range Status    02/01/2021 14 <=35 U/L Final     AST (SGOT)   Date Value Ref Range Status   02/01/2021 20 14 - 36 U/L Final     Total Bilirubin   Date Value Ref Range Status   02/01/2021 0.3 0.2 - 1.3 mg/dL Final     Albumin   Date Value Ref Range Status   02/01/2021 3.80 3.50 - 5.00 g/dL Final     Globulin   Date Value Ref Range Status   02/01/2021 3.1 2.3 - 3.5 gm/dL Final     Lab Results   Component Value Date    WBC 9.87 05/03/2021    HGB 13.6 05/03/2021    HCT 40.7 05/03/2021    MCV 81.7 05/03/2021     05/03/2021     Lab Results   Component Value Date    NEUTROABS 6.37 05/03/2021    IRON 57 05/03/2021    IRON 30 (L) 03/08/2021    IRON 33 (L) 02/01/2021    TIBC 361 05/03/2021    TIBC 495 03/08/2021    TIBC 481 02/01/2021    LABIRON 16 (L) 05/03/2021    LABIRON 6 (L) 03/08/2021    LABIRON 7 (L) 02/01/2021    FERRITIN 181.20 (H) 05/03/2021    FERRITIN 10.89 (L) 03/08/2021    FERRITIN 11.10 (L) 02/01/2021    BTCADJOP81 378 05/03/2021    BTOYZCCN69 404 03/08/2021    QHUDKDQR33 552 08/11/2020    FOLATE 7.32 05/03/2021    FOLATE 8.50 03/08/2021    FOLATE 8.76 08/11/2020     Lab Results   Component Value Date    HCGQUANT <0.50 08/11/2020             RADIOLOGY DATA :  No radiology results for the last 7 days        Assessment/Plan     Iron deficiceny anemia secondary to menorrhagia; unable to tolerate PO iron  IV iron given last month; Hgb today is 13.6 and iron saturation has improved from 6% to 16% and ferritin has improved from 11 to 181.  Will need to monitor; will recheck labs again in 3  Mos.  Will make referral to GYN for menorrhgia               PHQ-9 Total Score: 1     Marizol WATKINS reports a pain score of 0.  Given her pain assessment as noted, treatment options were discussed and the following options were decided upon as a follow-up plan to address the patient's pain: no pain reported today.         Maria Esther Butler, GIL  5/6/2021  14:04 CDT        Part of this note may be an electronic transcription/translation of  spoken language to printed text using the Dragon Dictation System.          CC:

## 2021-05-06 NOTE — TELEPHONE ENCOUNTER
----- Message from GIL Aguilar sent at 5/6/2021  2:15 PM CDT -----  Let her know b-12 nad folate low; sending prescription to pharmacy

## 2021-05-11 DIAGNOSIS — J45.20 MILD INTERMITTENT ASTHMA WITHOUT COMPLICATION: Primary | ICD-10-CM

## 2021-05-11 RX ORDER — ALBUTEROL SULFATE 90 UG/1
2 AEROSOL, METERED RESPIRATORY (INHALATION) EVERY 6 HOURS PRN
Qty: 18 G | Refills: 11 | Status: SHIPPED | OUTPATIENT
Start: 2021-05-11 | End: 2021-07-13 | Stop reason: SDUPTHER

## 2021-06-07 DIAGNOSIS — M54.41 CHRONIC BILATERAL LOW BACK PAIN WITH BILATERAL SCIATICA: ICD-10-CM

## 2021-06-07 DIAGNOSIS — G89.29 CHRONIC BILATERAL LOW BACK PAIN WITH BILATERAL SCIATICA: ICD-10-CM

## 2021-06-07 DIAGNOSIS — M54.2 CHRONIC MIDLINE POSTERIOR NECK PAIN: ICD-10-CM

## 2021-06-07 DIAGNOSIS — M54.42 CHRONIC BILATERAL LOW BACK PAIN WITH BILATERAL SCIATICA: ICD-10-CM

## 2021-06-07 DIAGNOSIS — G89.29 CHRONIC MIDLINE POSTERIOR NECK PAIN: ICD-10-CM

## 2021-06-08 ENCOUNTER — TELEPHONE (OUTPATIENT)
Dept: FAMILY MEDICINE CLINIC | Facility: CLINIC | Age: 31
End: 2021-06-08

## 2021-06-08 RX ORDER — HYDROCODONE BITARTRATE AND ACETAMINOPHEN 7.5; 325 MG/1; MG/1
1 TABLET ORAL 2 TIMES DAILY PRN
Qty: 45 TABLET | Refills: 0 | Status: SHIPPED | OUTPATIENT
Start: 2021-06-08 | End: 2021-07-13 | Stop reason: SDUPTHER

## 2021-06-09 NOTE — TELEPHONE ENCOUNTER
Patient seen in office every 3 months for chronic pain requiring opiate pain medication. Compliant with medication, visits with no adverse effects noted. FILEMON and UDS current and appropriate. Patient called requesting scheduled refill at appropriate interval. Patient understands the risks associated with this controlled medication, including tolerance and addiction.  Patient also agrees to only obtain this medication from me, and not from a another provider, unless that provider is covering for me in my absence.  Patient also agrees to be compliant in dosing, and not self adjust the dose of medication.  A signed controlled substance agreement is on file, and the patient has received a controlled substance education sheet at this a previous visit.  The patient has also signed a consent for treatment with a controlled substance as per [facility] policy. FILEMON was obtained.   Refill sent for hydrocodone.     This document has been electronically signed by GIL Darling on June 8, 2021 19:10 CDT

## 2021-07-13 DIAGNOSIS — M54.2 CHRONIC MIDLINE POSTERIOR NECK PAIN: ICD-10-CM

## 2021-07-13 DIAGNOSIS — E55.9 VITAMIN D DEFICIENCY: ICD-10-CM

## 2021-07-13 DIAGNOSIS — M54.41 CHRONIC BILATERAL LOW BACK PAIN WITH BILATERAL SCIATICA: ICD-10-CM

## 2021-07-13 DIAGNOSIS — G89.29 CHRONIC MIDLINE POSTERIOR NECK PAIN: ICD-10-CM

## 2021-07-13 DIAGNOSIS — G89.29 CHRONIC BILATERAL LOW BACK PAIN WITH BILATERAL SCIATICA: ICD-10-CM

## 2021-07-13 DIAGNOSIS — M54.42 CHRONIC BILATERAL LOW BACK PAIN WITH BILATERAL SCIATICA: ICD-10-CM

## 2021-07-13 DIAGNOSIS — J45.20 MILD INTERMITTENT ASTHMA WITHOUT COMPLICATION: ICD-10-CM

## 2021-07-15 ENCOUNTER — TELEPHONE (OUTPATIENT)
Dept: FAMILY MEDICINE CLINIC | Facility: CLINIC | Age: 31
End: 2021-07-15

## 2021-07-15 RX ORDER — ALBUTEROL SULFATE 90 UG/1
2 AEROSOL, METERED RESPIRATORY (INHALATION) EVERY 6 HOURS PRN
Qty: 18 G | Refills: 11 | Status: SHIPPED | OUTPATIENT
Start: 2021-07-15 | End: 2021-10-05 | Stop reason: SDUPTHER

## 2021-07-15 RX ORDER — HYDROCODONE BITARTRATE AND ACETAMINOPHEN 7.5; 325 MG/1; MG/1
1 TABLET ORAL 2 TIMES DAILY PRN
Qty: 45 TABLET | Refills: 0 | Status: SHIPPED | OUTPATIENT
Start: 2021-07-15 | End: 2021-08-17 | Stop reason: SDUPTHER

## 2021-07-15 NOTE — TELEPHONE ENCOUNTER
Patient seen in office every 3 months for chronic pain requiring opiate pain medication. Compliant with medication, visits with no adverse effects noted. FILEMON and UDS current and appropriate. Patient called requesting scheduled refill at appropriate interval. Patient understands the risks associated with this controlled medication, including tolerance and addiction.  Patient also agrees to only obtain this medication from me, and not from a another provider, unless that provider is covering for me in my absence.  Patient also agrees to be compliant in dosing, and not self adjust the dose of medication.  A signed controlled substance agreement is on file, and the patient has received a controlled substance education sheet at this a previous visit.  The patient has also signed a consent for treatment with a controlled substance as per [facility] policy. FILEMON was obtained.   Refill sent for hydrocodone.     This document has been electronically signed by GIL Darling on July 15, 2021 10:26 CDT

## 2021-07-19 ENCOUNTER — OFFICE VISIT (OUTPATIENT)
Dept: FAMILY MEDICINE CLINIC | Facility: CLINIC | Age: 31
End: 2021-07-19

## 2021-07-19 ENCOUNTER — LAB (OUTPATIENT)
Dept: LAB | Facility: OTHER | Age: 31
End: 2021-07-19

## 2021-07-19 VITALS
OXYGEN SATURATION: 99 % | RESPIRATION RATE: 16 BRPM | HEART RATE: 88 BPM | TEMPERATURE: 99.3 F | SYSTOLIC BLOOD PRESSURE: 118 MMHG | DIASTOLIC BLOOD PRESSURE: 74 MMHG | BODY MASS INDEX: 45.86 KG/M2 | HEIGHT: 62 IN | WEIGHT: 249.2 LBS

## 2021-07-19 DIAGNOSIS — E55.9 VITAMIN D DEFICIENCY: ICD-10-CM

## 2021-07-19 DIAGNOSIS — G89.29 CHRONIC MIDLINE POSTERIOR NECK PAIN: Chronic | ICD-10-CM

## 2021-07-19 DIAGNOSIS — E78.1 HYPERTRIGLYCERIDEMIA: ICD-10-CM

## 2021-07-19 DIAGNOSIS — M54.41 CHRONIC BILATERAL LOW BACK PAIN WITH BILATERAL SCIATICA: Chronic | ICD-10-CM

## 2021-07-19 DIAGNOSIS — E16.1 HYPERINSULINEMIA: ICD-10-CM

## 2021-07-19 DIAGNOSIS — M54.42 CHRONIC BILATERAL LOW BACK PAIN WITH BILATERAL SCIATICA: Chronic | ICD-10-CM

## 2021-07-19 DIAGNOSIS — Z51.81 ENCOUNTER FOR THERAPEUTIC DRUG LEVEL MONITORING: ICD-10-CM

## 2021-07-19 DIAGNOSIS — G89.29 CHRONIC BILATERAL LOW BACK PAIN WITH BILATERAL SCIATICA: Chronic | ICD-10-CM

## 2021-07-19 DIAGNOSIS — E66.1 CLASS 3 DRUG-INDUCED OBESITY WITH SERIOUS COMORBIDITY AND BODY MASS INDEX (BMI) OF 45.0 TO 49.9 IN ADULT (HCC): ICD-10-CM

## 2021-07-19 DIAGNOSIS — M54.2 CHRONIC MIDLINE POSTERIOR NECK PAIN: Chronic | ICD-10-CM

## 2021-07-19 DIAGNOSIS — E61.1 IRON DEFICIENCY: ICD-10-CM

## 2021-07-19 DIAGNOSIS — D50.0 IRON DEFICIENCY ANEMIA DUE TO CHRONIC BLOOD LOSS: ICD-10-CM

## 2021-07-19 DIAGNOSIS — Z13.6 SCREENING, ISCHEMIC HEART DISEASE: ICD-10-CM

## 2021-07-19 DIAGNOSIS — R73.03 PREDIABETES: ICD-10-CM

## 2021-07-19 DIAGNOSIS — E88.81 INSULIN RESISTANCE: ICD-10-CM

## 2021-07-19 DIAGNOSIS — E66.01 MORBID (SEVERE) OBESITY DUE TO EXCESS CALORIES (HCC): Primary | ICD-10-CM

## 2021-07-19 LAB
ALBUMIN SERPL-MCNC: 3.8 G/DL (ref 3.5–5.2)
ALBUMIN/GLOB SERPL: 1.6 G/DL
ALP SERPL-CCNC: 60 U/L (ref 39–117)
ALT SERPL W P-5'-P-CCNC: 7 U/L (ref 1–33)
ANION GAP SERPL CALCULATED.3IONS-SCNC: 13 MMOL/L (ref 5–15)
AST SERPL-CCNC: 10 U/L (ref 1–32)
BASOPHILS # BLD AUTO: 0.07 10*3/MM3 (ref 0–0.2)
BASOPHILS NFR BLD AUTO: 0.7 % (ref 0–1.5)
BILIRUB SERPL-MCNC: 0.2 MG/DL (ref 0–1.2)
BUN SERPL-MCNC: 8 MG/DL (ref 6–20)
BUN/CREAT SERPL: 10 (ref 7–25)
CALCIUM SPEC-SCNC: 8.5 MG/DL (ref 8.6–10.5)
CHLORIDE SERPL-SCNC: 101 MMOL/L (ref 98–107)
CHOLEST SERPL-MCNC: 197 MG/DL (ref 0–200)
CO2 SERPL-SCNC: 22 MMOL/L (ref 22–29)
CREAT SERPL-MCNC: 0.8 MG/DL (ref 0.57–1)
DEPRECATED RDW RBC AUTO: 43.7 FL (ref 37–54)
EOSINOPHIL # BLD AUTO: 0.35 10*3/MM3 (ref 0–0.4)
EOSINOPHIL NFR BLD AUTO: 3.5 % (ref 0.3–6.2)
ERYTHROCYTE [DISTWIDTH] IN BLOOD BY AUTOMATED COUNT: 13.8 % (ref 12.3–15.4)
GFR SERPL CREATININE-BSD FRML MDRD: 83 ML/MIN/1.73
GLOBULIN UR ELPH-MCNC: 2.4 GM/DL
GLUCOSE SERPL-MCNC: 106 MG/DL (ref 65–99)
HCT VFR BLD AUTO: 40.5 % (ref 34–46.6)
HDLC SERPL-MCNC: 40 MG/DL (ref 40–60)
HGB BLD-MCNC: 14.1 G/DL (ref 12–15.9)
LDLC SERPL CALC-MCNC: 114 MG/DL (ref 0–100)
LDLC/HDLC SERPL: 2.71 {RATIO}
LYMPHOCYTES # BLD AUTO: 2.43 10*3/MM3 (ref 0.7–3.1)
LYMPHOCYTES NFR BLD AUTO: 24.5 % (ref 19.6–45.3)
MCH RBC QN AUTO: 31.1 PG (ref 26.6–33)
MCHC RBC AUTO-ENTMCNC: 34.8 G/DL (ref 31.5–35.7)
MCV RBC AUTO: 89.4 FL (ref 79–97)
MONOCYTES # BLD AUTO: 0.61 10*3/MM3 (ref 0.1–0.9)
MONOCYTES NFR BLD AUTO: 6.1 % (ref 5–12)
NEUTROPHILS NFR BLD AUTO: 6.47 10*3/MM3 (ref 1.7–7)
NEUTROPHILS NFR BLD AUTO: 65.2 % (ref 42.7–76)
PLATELET # BLD AUTO: 260 10*3/MM3 (ref 140–450)
PMV BLD AUTO: 9.5 FL (ref 6–12)
POTASSIUM SERPL-SCNC: 3.6 MMOL/L (ref 3.5–5.2)
PROT SERPL-MCNC: 6.2 G/DL (ref 6–8.5)
RBC # BLD AUTO: 4.53 10*6/MM3 (ref 3.77–5.28)
SODIUM SERPL-SCNC: 136 MMOL/L (ref 136–145)
TRIGL SERPL-MCNC: 244 MG/DL (ref 0–150)
VLDLC SERPL-MCNC: 43 MG/DL (ref 5–40)
WBC # BLD AUTO: 9.93 10*3/MM3 (ref 3.4–10.8)

## 2021-07-19 PROCEDURE — 82306 VITAMIN D 25 HYDROXY: CPT | Performed by: NURSE PRACTITIONER

## 2021-07-19 PROCEDURE — G0481 DRUG TEST DEF 8-14 CLASSES: HCPCS | Performed by: NURSE PRACTITIONER

## 2021-07-19 PROCEDURE — 83525 ASSAY OF INSULIN: CPT | Performed by: NURSE PRACTITIONER

## 2021-07-19 PROCEDURE — 93000 ELECTROCARDIOGRAM COMPLETE: CPT | Performed by: NURSE PRACTITIONER

## 2021-07-19 PROCEDURE — 80053 COMPREHEN METABOLIC PANEL: CPT | Performed by: NURSE PRACTITIONER

## 2021-07-19 PROCEDURE — 80307 DRUG TEST PRSMV CHEM ANLYZR: CPT | Performed by: NURSE PRACTITIONER

## 2021-07-19 PROCEDURE — 80061 LIPID PANEL: CPT | Performed by: NURSE PRACTITIONER

## 2021-07-19 PROCEDURE — 84466 ASSAY OF TRANSFERRIN: CPT | Performed by: NURSE PRACTITIONER

## 2021-07-19 PROCEDURE — 85025 COMPLETE CBC W/AUTO DIFF WBC: CPT | Performed by: NURSE PRACTITIONER

## 2021-07-19 PROCEDURE — 83527 ASSAY OF INSULIN: CPT | Performed by: NURSE PRACTITIONER

## 2021-07-19 PROCEDURE — 83036 HEMOGLOBIN GLYCOSYLATED A1C: CPT | Performed by: NURSE PRACTITIONER

## 2021-07-19 PROCEDURE — 83540 ASSAY OF IRON: CPT | Performed by: NURSE PRACTITIONER

## 2021-07-19 PROCEDURE — 99214 OFFICE O/P EST MOD 30 MIN: CPT | Performed by: NURSE PRACTITIONER

## 2021-07-19 RX ORDER — ERGOCALCIFEROL 1.25 MG/1
CAPSULE ORAL
COMMUNITY
Start: 2021-07-15 | End: 2021-07-19 | Stop reason: SDUPTHER

## 2021-07-19 RX ORDER — PHENTERMINE AND TOPIRAMATE 3.75; 23 MG/1; MG/1
1 CAPSULE, EXTENDED RELEASE ORAL DAILY
Qty: 30 CAPSULE | Refills: 0 | Status: SHIPPED | OUTPATIENT
Start: 2021-07-19 | End: 2021-07-20 | Stop reason: SDUPTHER

## 2021-07-19 NOTE — PROGRESS NOTES
Subjective   Marizol WATKINS is a 30 y.o. female.       Chief Complaint   Patient presents with   • Pain     12 week f/u        Pain  This is a chronic problem. The current episode started more than 1 year ago. The problem occurs constantly. The problem has been gradually worsening. Associated symptoms include neck pain. Pertinent negatives include no abdominal pain, arthralgias, chest pain, congestion, coughing, nausea or vomiting. The symptoms are aggravated by bending and twisting. She has tried oral narcotics, acetaminophen, ice, heat, lying down, NSAIDs, position changes, walking and rest for the symptoms. The treatment provided significant (hydrocodone with good relief) relief.        Chronic lower back pain due to DDD lumbar spine. Chronic pain of posterior neck due to DDD cervical spine with repeated strains. Managed with hydrocodone with reported good relief. 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 Compliant with use. Not due for refill today.       Obeisity, gradually worsening over time. Has tried topiramate in the past and by itself was ineffective. Has been hearing about Qsymia, combo of topirimate and phentermine, wants to try this for weight loss.       No other complaints today.   Parts of most recent relevant visit HPI, ROS  and PE may be carried forward and all are updated as appropriate for current situation.    Past Surgical History:   Procedure Laterality Date   • ADENOIDECTOMY     •  SECTION     • INJECTION OF MEDICATION  2014    Rocephin(1)   • OOPHORECTOMY      ? R tube and ovary at the time of c/s   • TONSILLECTOMY        Social History     Socioeconomic History   • Marital status:      Spouse name: Not on file   • Number of children: Not on file   • Years of education: Not on file   • Highest education level: Not on file   Tobacco Use   • Smoking status: Current Every Day Smoker      Packs/day: 1.00   • Smokeless tobacco: Never Used   Substance and Sexual Activity   • Alcohol use: No   • Drug use: No   • Sexual activity: Defer      The following portions of the patient's history were reviewed and updated as appropriate: She  has a past medical history of Acute bronchitis, Anxiety, Bipolar disease, chronic (CMS/HCC), Cough, Itch of skin, Mild depression (CMS/HCC), and Visit for gynecologic examination..    Review of Systems   Constitutional: Negative.    HENT: Negative.  Negative for congestion.    Eyes: Negative.  Negative for blurred vision, double vision, photophobia and visual disturbance.   Respiratory: Negative.  Negative for cough, shortness of breath, wheezing and stridor.    Cardiovascular: Negative.  Negative for chest pain, palpitations and leg swelling.   Gastrointestinal: Negative.  Negative for abdominal distention, abdominal pain, blood in stool, constipation, diarrhea, nausea, vomiting, GERD and indigestion.   Endocrine: Negative.  Negative for cold intolerance and heat intolerance.   Genitourinary: Negative.  Negative for dysuria, flank pain, frequency and urinary incontinence.   Musculoskeletal: Positive for back pain and neck pain. Negative for arthralgias.   Skin: Negative.    Allergic/Immunologic: Negative.  Negative for immunocompromised state.   Neurological: Negative.    Hematological: Negative.    Psychiatric/Behavioral: Negative.  Negative for agitation, behavioral problems, decreased concentration, dysphoric mood, hallucinations, self-injury, sleep disturbance, suicidal ideas, negative for hyperactivity, depressed mood and stress. The patient is not nervous/anxious.    All other systems reviewed and are negative.    PHQ-9 Depression Screening  Little interest or pleasure in doing things?     Feeling down, depressed, or hopeless?     Trouble falling or staying asleep, or sleeping too much?     Feeling tired or having little energy?     Poor appetite or overeating?      "  Feeling bad about yourself - or that you are a failure or have let yourself or your family down?     Trouble concentrating on things, such as reading the newspaper or watching television?     Moving or speaking so slowly that other people could have noticed? Or the opposite - being so fidgety or restless that you have been moving around a lot more than usual?     Thoughts that you would be better off dead, or of hurting yourself in some way?     PHQ-9 Total Score     If you checked off any problems, how difficult have these problems made it for you to do your work, take care of things at home, or get along with other people?      Patient understands the risks associated with this controlled medication, including tolerance and addiction.  Patient also agrees to only obtain this medication from me, and not from a another provider, unless that provider is covering for me in my absence.  Patient also agrees to be compliant in dosing, and not self adjust the dose of medication.  A signed controlled substance agreement is on file, and the patient has received a controlled substance education sheet at this a previous visit.  The patient has also signed a consent for treatment with a controlled substance as per [facility] policy. FILEMON was obtained.    Objective    Vitals:    07/19/21 1410   BP: 118/74   BP Location: Left arm   Patient Position: Sitting   Cuff Size: Adult   Pulse: 88   Resp: 16   Temp: 99.3 °F (37.4 °C)   SpO2: 99%   Weight: 113 kg (249 lb 3.2 oz)   Height: 157.5 cm (62\")   PainSc:   4   PainLoc: Back  Comment: neck   Body mass index is 45.58 kg/m².      ECG 12 Lead    Date/Time: 7/19/2021 3:47 PM  Performed by: Juhi Gordon APRN  Authorized by: Juhi Gordon APRN   Comparison: not compared with previous ECG   Previous ECG: no previous ECG available  Rhythm: sinus rhythm  Rate: normal  Conduction: conduction normal  ST Segments: ST segments normal  T Waves: T waves normal  QRS axis: " normal  Other: no other findings    Clinical impression: normal ECG  Comments: EKG:   Indication: screening for ischemic heart disease before stimulant prescription for weight loss   Vent Rate: 86  bpm   TESFAYE:  168  ms   QRS: 78 ms   QT/QTc:368/ 413  ms   Interpretation: NSR normal EKG  Previous EKG: no previous tracing for comparison.              Physical Exam  Vitals and nursing note reviewed.   Constitutional:       General: She is not in acute distress.     Appearance: Normal appearance. She is well-developed. She is obese. She is not ill-appearing or diaphoretic.   HENT:      Head: Normocephalic and atraumatic.   Eyes:      General: No scleral icterus.        Right eye: No discharge.         Left eye: No discharge.      Conjunctiva/sclera: Conjunctivae normal.      Pupils: Pupils are equal, round, and reactive to light.   Neck:      Thyroid: No thyromegaly.      Vascular: No carotid bruit or JVD.      Trachea: No tracheal deviation.   Cardiovascular:      Rate and Rhythm: Normal rate and regular rhythm.      Pulses: Normal pulses.      Heart sounds: Normal heart sounds. No murmur heard.   No friction rub. No gallop.    Pulmonary:      Effort: Pulmonary effort is normal. No respiratory distress.      Breath sounds: Normal breath sounds. No stridor. No wheezing, rhonchi or rales.   Chest:      Chest wall: No tenderness.   Abdominal:      General: Bowel sounds are normal.      Palpations: Abdomen is soft.   Musculoskeletal:         General: No tenderness or deformity. Normal range of motion.      Cervical back: Normal range of motion and neck supple. No rigidity or tenderness.      Right lower leg: No edema.      Left lower leg: No edema.   Lymphadenopathy:      Cervical: No cervical adenopathy.   Skin:     General: Skin is warm and dry.      Capillary Refill: Capillary refill takes 2 to 3 seconds.      Coloration: Skin is not jaundiced or pale.      Findings: No bruising, erythema, lesion or rash.   Neurological:       General: No focal deficit present.      Mental Status: She is alert and oriented to person, place, and time. Mental status is at baseline.      Cranial Nerves: No cranial nerve deficit.      Motor: No weakness.      Gait: Gait normal.   Psychiatric:         Mood and Affect: Mood normal.         Behavior: Behavior normal.         Thought Content: Thought content normal.         Judgment: Judgment normal.     stable chronic lower back pain and and posterior neck pain. No refill hydrocodone needed at present.   Routine labs are due, were collected today, but not resulted at time of visit. Will call pt with results.     Assessment/Plan   Diagnoses and all orders for this visit:    1. Morbid (severe) obesity due to excess calories (CMS/[facility]) (Primary)  -     Phentermine-Topiramate (Qsymia) 3.75-23 MG capsule sustained-release 24 hr; Take 1 capsule by mouth Daily.  Dispense: 30 capsule; Refill: 0  -     ECG 12 Lead    2. Chronic bilateral low back pain with bilateral sciatica    3. Chronic midline posterior neck pain    4. Class 3 drug-induced obesity with serious comorbidity and body mass index (BMI) of 45.0 to 49.9 in adult (CMS/HCC)  -     Phentermine-Topiramate (Qsymia) 3.75-23 MG capsule sustained-release 24 hr; Take 1 capsule by mouth Daily.  Dispense: 30 capsule; Refill: 0  -     ECG 12 Lead    5. Screening, ischemic heart disease  -     ECG 12 Lead        Return in about 4 weeks (around 8/16/2021).           This document has been electronically signed by GIL Darling on July 19, 2021 15:50 CDT

## 2021-07-20 DIAGNOSIS — E66.1 CLASS 3 DRUG-INDUCED OBESITY WITH SERIOUS COMORBIDITY AND BODY MASS INDEX (BMI) OF 45.0 TO 49.9 IN ADULT (HCC): ICD-10-CM

## 2021-07-20 DIAGNOSIS — E66.01 MORBID (SEVERE) OBESITY DUE TO EXCESS CALORIES (HCC): ICD-10-CM

## 2021-07-20 LAB
25(OH)D3 SERPL-MCNC: 31.9 NG/ML (ref 30–100)
HBA1C MFR BLD: 5.04 % (ref 4.8–5.6)
IRON 24H UR-MRATE: 53 MCG/DL (ref 37–145)
IRON SATN MFR SERPL: 14 % (ref 20–50)
TIBC SERPL-MCNC: 380 MCG/DL (ref 298–536)
TRANSFERRIN SERPL-MCNC: 255 MG/DL (ref 200–360)

## 2021-07-20 RX ORDER — PHENTERMINE AND TOPIRAMATE 3.75; 23 MG/1; MG/1
1 CAPSULE, EXTENDED RELEASE ORAL DAILY
Qty: 30 CAPSULE | Refills: 0 | Status: SHIPPED | OUTPATIENT
Start: 2021-07-20 | End: 2021-10-22

## 2021-07-25 LAB
6MAM UR QL CFM: NEGATIVE
6MAM/CREAT UR: NOT DETECTED NG/MG CREAT
7AMINOCLONAZEPAM/CREAT UR: NOT DETECTED NG/MG CREAT
A-OH ALPRAZ/CREAT UR: NOT DETECTED NG/MG CREAT
A-OH-TRIAZOLAM/CREAT UR CFM: NOT DETECTED NG/MG CREAT
ALFENTANIL/CREAT UR CFM: NOT DETECTED NG/MG CREAT
ALPHA-HYDROXYMIDAZOLAM, URINE: NOT DETECTED NG/MG CREAT
ALPRAZ/CREAT UR CFM: NOT DETECTED NG/MG CREAT
AMOBARBITAL UR QL CFM: NOT DETECTED
AMPHET/CREAT UR: NOT DETECTED NG/MG CREAT
AMPHETAMINES UR QL CFM: NEGATIVE
BARBITAL UR QL CFM: NOT DETECTED
BARBITURATES UR QL CFM: NEGATIVE
BENZODIAZ UR QL CFM: NEGATIVE
BUPRENORPHINE UR QL CFM: NEGATIVE
BUPRENORPHINE/CREAT UR: NOT DETECTED NG/MG CREAT
BUTABARBITAL UR QL CFM: NOT DETECTED
BUTALBITAL UR QL CFM: NOT DETECTED
BZE/CREAT UR: NOT DETECTED NG/MG CREAT
CANNABINOIDS UR QL CFM: NEGATIVE
CARBOXYTHC/CREAT UR: NOT DETECTED NG/MG CREAT
CLONAZEPAM/CREAT UR CFM: NOT DETECTED NG/MG CREAT
COCAETHYLENE/CREAT UR CFM: NOT DETECTED NG/MG CREAT
COCAINE UR QL CFM: NEGATIVE
COCAINE/CREAT UR CFM: NOT DETECTED NG/MG CREAT
CODEINE/CREAT UR: NOT DETECTED NG/MG CREAT
CREAT UR-MCNC: 227 MG/DL
DESALKYLFLURAZ/CREAT UR: NOT DETECTED NG/MG CREAT
DESMETHYLFLUNITRAZEPAM: NOT DETECTED NG/MG CREAT
DHC/CREAT UR: 96 NG/MG CREAT
DIAZEPAM/CREAT UR: NOT DETECTED NG/MG CREAT
DRUGS UR: NORMAL
EDDP/CREAT UR: NOT DETECTED NG/MG CREAT
ETHANOL UR CFM-MCNC: NOT DETECTED G/DL
ETHANOL UR QL CFM: NEGATIVE
FENTANYL UR QL CFM: NEGATIVE
FENTANYL/CREAT UR: NOT DETECTED NG/MG CREAT
FLUNITRAZEPAM UR QL CFM: NOT DETECTED NG/MG CREAT
HYDROCODONE/CREAT UR: 251 NG/MG CREAT
HYDROMORPHONE/CREAT UR: NOT DETECTED NG/MG CREAT
INSULIN FREE SERPL-ACNC: 66 UU/ML
INSULIN SERPL-ACNC: 66 UU/ML
LEVEL OF DETECTION:: NORMAL
LORAZEPAM/CREAT UR: NOT DETECTED NG/MG CREAT
MDA/CREAT UR: NOT DETECTED NG/MG CREAT
MDMA/CREAT UR: NOT DETECTED NG/MG CREAT
MEPHOBARBITAL UR QL CFM: NOT DETECTED
METHADONE UR QL CFM: NEGATIVE
METHADONE/CREAT UR: NOT DETECTED NG/MG CREAT
METHAMPHET/CREAT UR: NOT DETECTED NG/MG CREAT
MIDAZOLAM/CREAT UR CFM: NOT DETECTED NG/MG CREAT
MORPHINE/CREAT UR: NOT DETECTED NG/MG CREAT
N-NORTRAMADOL/CREAT UR CFM: NOT DETECTED NG/MG CREAT
NARCOTICS UR: NEGATIVE
NORBUPRENORPHINE/CREAT UR: NOT DETECTED NG/MG CREAT
NORCODEINE/CREAT UR CFM: NOT DETECTED NG/MG CREAT
NORDIAZEPAM/CREAT UR: NOT DETECTED NG/MG CREAT
NORFENTANYL/CREAT UR: NOT DETECTED NG/MG CREAT
NORHYDROCODONE/CREAT UR: 223 NG/MG CREAT
NORMORPHINE UR-MCNC: NOT DETECTED NG/MG CREAT
NOROXYCODONE/CREAT UR: NOT DETECTED NG/MG CREAT
NOROXYMORPHONE/CREAT UR CFM: NOT DETECTED NG/MG CREAT
O-NORTRAMADOL UR CFM-MCNC: NOT DETECTED NG/MG CREAT
OPIATES UR QL CFM: NORMAL
OXAZEPAM/CREAT UR: NOT DETECTED NG/MG CREAT
OXYCODONE UR QL CFM: NEGATIVE
OXYCODONE/CREAT UR: NOT DETECTED NG/MG CREAT
OXYMORPHONE/CREAT UR: NOT DETECTED NG/MG CREAT
PENTOBARB UR QL CFM: NOT DETECTED
PHENOBARB UR QL CFM: NOT DETECTED
SECOBARBITAL UR QL CFM: NOT DETECTED
SUFENTANIL/CREAT UR CFM: NOT DETECTED NG/MG CREAT
TAPENTADOL UR QL CFM: NEGATIVE
TAPENTADOL/CREAT UR: NOT DETECTED NG/MG CREAT
TEMAZEPAM/CREAT UR: NOT DETECTED NG/MG CREAT
THIOPENTAL UR QL CFM: NOT DETECTED
TRAMADOL UR QL CFM: NOT DETECTED NG/MG CREAT

## 2021-08-12 ENCOUNTER — OFFICE VISIT (OUTPATIENT)
Dept: OBSTETRICS AND GYNECOLOGY | Facility: CLINIC | Age: 31
End: 2021-08-12

## 2021-08-12 ENCOUNTER — LAB (OUTPATIENT)
Dept: LAB | Facility: OTHER | Age: 31
End: 2021-08-12

## 2021-08-12 VITALS
DIASTOLIC BLOOD PRESSURE: 72 MMHG | SYSTOLIC BLOOD PRESSURE: 118 MMHG | HEIGHT: 62 IN | BODY MASS INDEX: 45.27 KG/M2 | WEIGHT: 246 LBS

## 2021-08-12 DIAGNOSIS — N88.2 CERVICAL STENOSIS (UTERINE CERVIX): ICD-10-CM

## 2021-08-12 DIAGNOSIS — I27.20 PULMONARY HYPERTENSION (HCC): ICD-10-CM

## 2021-08-12 DIAGNOSIS — D50.9 IRON DEFICIENCY ANEMIA, UNSPECIFIED IRON DEFICIENCY ANEMIA TYPE: ICD-10-CM

## 2021-08-12 DIAGNOSIS — N93.9 ABNORMAL UTERINE BLEEDING: Primary | ICD-10-CM

## 2021-08-12 DIAGNOSIS — Z12.4 CERVICAL CANCER SCREENING: ICD-10-CM

## 2021-08-12 PROBLEM — N92.0 MENORRHAGIA WITH REGULAR CYCLE: Status: RESOLVED | Noted: 2021-03-08 | Resolved: 2021-08-12

## 2021-08-12 PROBLEM — E61.1 IRON DEFICIENCY: Status: RESOLVED | Noted: 2021-03-08 | Resolved: 2021-08-12

## 2021-08-12 LAB — BNP SERPL-MCNC: <5 PG/ML (ref 0–100)

## 2021-08-12 PROCEDURE — 83880 ASSAY OF NATRIURETIC PEPTIDE: CPT | Performed by: OBSTETRICS & GYNECOLOGY

## 2021-08-12 PROCEDURE — 99243 OFF/OP CNSLTJ NEW/EST LOW 30: CPT | Performed by: OBSTETRICS & GYNECOLOGY

## 2021-08-12 RX ORDER — MISOPROSTOL 200 UG/1
TABLET ORAL
Qty: 2 TABLET | Refills: 0 | Status: SHIPPED | OUTPATIENT
Start: 2021-08-12 | End: 2021-11-23

## 2021-08-12 RX ORDER — FOLIC ACID 1 MG/1
TABLET ORAL
COMMUNITY
Start: 2021-07-19 | End: 2021-11-23

## 2021-08-12 NOTE — PROGRESS NOTES
[facility]  Gynecology Consult  Date of Service: 2021  Referring provider: GIL Aguilar    CC: Iron deficiency anemia    HPI  Marizol WATKINS is a 30 y.o.  premenopausal female who presents as a referral from GIL Willoughby secondary to heavy menses in the setting of iron deficiency anemia.    She has a history of iron deficiency anemia and was referred to Dr. Alexander in 2021.  She received 2 infusions of Injectafer in late 2021.  Her starting Hgb prior to treatment was 11.1.  The lowest Hgb in our system was 10.8 on 3/8/2021.    She states that she has always had heavy and irregular periods.  She said that they are every 21-35 days, lasting 7 days.  She will go through 1 tampon/hour.  Reports fatigue.  She has been on Naproxen for several months which has not helped.  She has been on DepoProvera in the past but gained 70 lbs.    She has 1 child with her prior .  She has been  for 4 years and although she would like to have a child with her current , she does not want to pursue fertility testing.    She has had lab work done in the past few months.   2021 14:00   WBC 9.93   RBC 4.53   Hemoglobin 14.1   Hematocrit 40.5   RDW 13.8   MCV 89.4   MCH 31.1   MCHC 34.8   MPV 9.5   Platelets 260      2021 08:45   TSH Baseline 2.280   Free T4 1.26   T3, Total 142.0      2021 14:00   Hemoglobin A1C 5.04   Insulin 66   Insulin, Free 66 (H)     ROS  Review of Systems   Constitutional: Positive for fatigue and unexpected weight change.   Eyes: Negative.    Respiratory: Positive for shortness of breath.    Cardiovascular: Negative.    Gastrointestinal: Positive for nausea.   Endocrine: Negative.    Genitourinary: Positive for dyspareunia, frequency, menstrual problem, urgency and vaginal bleeding.   Musculoskeletal: Positive for back pain, neck pain and neck stiffness.   Skin: Negative.    Allergic/Immunologic: Negative.    Neurological:  Positive for numbness.   Hematological: Negative.    Psychiatric/Behavioral: Positive for dysphoric mood and sleep disturbance. The patient is nervous/anxious.      GYN HISTORY  Menarche: age 8  Menses: Irregular, every 21-35 days, lasts 7 days, ++ heavy, no intermenstrual bleeding  History of STIs: Gonorrhea,   Last pap smear:   Abnormal pap smear history: None  Contraception: None  S/p ?RSO at time of  for large ovarian cyst     OB HISTORY  OB History    Para Term  AB Living   3 1 1   2 1   SAB TAB Ectopic Molar Multiple Live Births   2         1      # Outcome Date GA Lbr Brennen/2nd Weight Sex Delivery Anes PTL Lv   3 Term 2010     CS-Unspec   LUPILLO   2 SAB      SAB      1 SAB      SAB        PAST MEDICAL HISTORY  Past Medical History:   Diagnosis Date   • Abnormal Pap smear of cervix    • Anxiety    • Arthritis    • Asthma    • Bipolar disease, chronic (CMS/HCC)    • Endometriosis    • Gonorrhea    • Kidney stone    • Mild depression (CMS/HCC)    • Polycystic ovary syndrome    • PONV (postoperative nausea and vomiting)    • Prediabetes    • Pulmonary arterial hypertension (CMS/HCC)      PAST SURGICAL HISTORY  Past Surgical History:   Procedure Laterality Date   •  SECTION  2010   • SALPINGO OOPHORECTOMY Right 2010    Large ovarian cyst, ?R side, at time of  removed   • TONSILLECTOMY AND ADENOIDECTOMY     • WISDOM TOOTH EXTRACTION       FAMILY HISTORY  Family History   Problem Relation Age of Onset   • Ovarian cancer Mother    • Diabetes Father    • Heart disease Father    • Hypertension Maternal Grandmother    • Breast cancer Paternal Grandmother    • Colon cancer Neg Hx    • Endometrial cancer Neg Hx    • Ovarian cancer Neg Hx      SOCIAL HISTORY  Social History     Socioeconomic History   • Marital status:      Spouse name: Not on file   • Number of children: Not on file   • Years of education: Not on file   • Highest education level: Not on file   Tobacco  Use   • Smoking status: Current Every Day Smoker     Packs/day: 1.00     Years: 10.00     Pack years: 10.00   • Smokeless tobacco: Never Used   Substance and Sexual Activity   • Alcohol use: No   • Drug use: No   • Sexual activity: Yes     Partners: Male     Birth control/protection: None     ALLERGIES  Allergies   Allergen Reactions   • Penicillins Anaphylaxis   • Tramadol Anaphylaxis and Rash   • Fluoxetine Nausea Only and Other (See Comments)     Headaches   • Latex Swelling and Hives   • Bupropion Other (See Comments)     Makes her feel bad   • Celexa [Citalopram Hydrobromide] Anxiety   • Codeine Rash   • Cymbalta [Duloxetine Hcl] Anxiety   • Effexor [Venlafaxine] Anxiety   • Geodon [Ziprasidone Hcl] Anxiety   • Lamictal [Lamotrigine] Anxiety   • Latuda [Lurasidone Hcl] Anxiety   • Paxil [Paroxetine Hcl] Anxiety   • Viibryd [Vilazodone Hcl] Anxiety   • Zoloft [Sertraline Hcl] Anxiety     HOME MEDICATIONS  Prior to Admission medications    Medication Sig Start Date End Date Taking? Authorizing Provider   calcipotriene (DOVONEX) 0.005 % cream Apply  topically to the appropriate area as directed 2 (Two) Times a Day. 1/28/20   Evan, GIL Barr   cholecalciferol (VITAMIN D3) 1.25 MG (21432 UT) capsule Take 1 capsule by mouth 2 (Two) Times a Week. Must have vit D level collected in May when out of refills. 7/15/21   Juhi Gordon APRN   famotidine (PEPCID) 40 MG tablet Take 1 tablet by mouth Daily. 1/4/21   Juhi Gordon APRN   HYDROcodone-acetaminophen (NORCO) 7.5-325 MG per tablet Take 1 tablet by mouth 2 (Two) Times a Day As Needed for Moderate Pain  (only when necessary). 7/15/21   Juhi Gordon APRN   metFORMIN (Glucophage) 1000 MG tablet Take 1 tablet by mouth 2 (Two) Times a Day With Meals. 3/25/21   Juhi Gordon APRN   naproxen (Naprosyn) 500 MG tablet Take 1 tablet by mouth 2 (Two) Times a Day As Needed for Mild Pain  (back pain, menstrual pain). 5/3/21   Juhi Gordon  "GIL   ondansetron (ZOFRAN) 4 MG tablet Take 1 tablet by mouth Every 8 (Eight) Hours As Needed for Nausea or Vomiting. 3/25/21   Gordon, Juhi SmithGIL gottlieb   pantoprazole (PROTONIX) 40 MG EC tablet Take 1 tablet by mouth Daily. For acid reflux 3/25/21   Gordon, Juhi GIL Navarro   Phentermine-Topiramate (Qsymia) 3.75-23 MG capsule sustained-release 24 hr Take 1 capsule by mouth Daily. 7/20/21   Gordon, Juhi GIL Navarro   Ventolin  (90 Base) MCG/ACT inhaler Inhale 2 puffs Every 6 (Six) Hours As Needed for Wheezing or Shortness of Air. Ventolin name brand only. 7/15/21   Gordon, Juhi SmithGIL gottlieb   vitamin B-12 (CYANOCOBALAMIN) 1000 MCG tablet Take 1 tablet by mouth Daily. 5/6/21   Maria Esther Butler APRN     PE  /72   Ht 157.5 cm (62\")   Wt 112 kg (246 lb)   LMP 07/20/2021 (Exact Date)   BMI 44.99 kg/m²        General: Alert, healthy, no distress, well nourished and well developed.  Neurologic: Alert, oriented to person, place, and time.  Gait normal.  Cranial nerves II-XII grossly intact.  HEENT: Normocephalic, atraumatic.  Extraocular muscles intact, pupils equal and reactive times two.    Neck: Supple, no adenopathy, thyroid normal size, non-tender, without nodularity, trachea midline.  Lungs: Normal respiratory effort.  Clear to auscultation bilaterally.  No wheezes, rhonci, or rales.  Heart: Regular rate and rhythm.  No murmer, rub or gallop.  Abdomen: Soft, non-tender, non-distended,no masses, no hepatosplenomegaly, no hernia.  Skin: No rash, no lesions.  Extremities: No cyanosis, clubbing or edema.  PELVIC EXAM:  External Genitalia/Vulva: Anatomy is normal, no significant redness of labia, no discharge on vulvar tissues, [place]'s and Bartholin's glands are normal, no ulcers, no condylomatous lesions.  Urethral meatus: Normal, no lesions, no prolapse.  Urethra: Normal, no masses, no tenderness with palpation.  Bladder: Normal, no fullness, no masses, no tenderness with palpation.  Vagina: Vaginal " tissues are not inflamed, normal color and texture, no significant discharge present.  Pelvic support adequate.  Cervix: Normal, no lesions, no purulent discharge, no cervical motion tenderness.  Pap smear obtained.  Uterus: Normal size, shape, and consistency.  Good mobility noted.  Minimal descent noted with good support.  Adnexa: Normal size and shape bilaterally, no palpable mass bilaterally and non-tender bilaterally.  Rectal: Normal, no masses or polyps, confirms bimanual exam, perianal normal, no lesions; FELIPE deferred.    See procedure note for EMB.    IMPRESSION  Marizol WATKINS is a 30 y.o.  presenting with AUB, unknown etiology.  Discussed treatment options including NSAIDs, TXA as well as hormonal options (OCPs, POPs, DepoProvera, contraceptive ring/patch, Nexplanon, Mirena IUD).  She has failed NSAIDs and had significant weight gain with DepoProvera and declines.  Discussed surgical options including endometrial ablation, hysterectomy.  Discussed failure rate of endometrial ablation, especially at a young age and discussed that her endometrium would be difficult to sample in the future which could delay diagnosis of cancer.  She is interested in definitive surgical management.  EMB aborted today due to patient intolerance as well as cervical stenosis.  Will obtain TVUS and retry EMB.  Discussed that due to her obesity and risk of hyperplasia/malignancy, this is needed prior to surgery; voices understanding.    PLAN    1. Abnormal uterine bleeding  - US Non-ob Transvaginal; Future  - RTC for US/EMB w/ Cytotec ripening    2. Iron deficiency anemia, unspecified iron deficiency anemia type    3. Pulmonary hypertension (CMS/HCC)  Per chart review.  Patient is unaware of diagnosis.  Will obtain cardiac clearance prior to surgery.  - Adult Transthoracic Echo Complete w/ Color, Spectral and Contrast if necessary per protocol; Future  - Ambulatory Referral to Cardiology  - BNP; Future         Thank you for  allowing me to participate in the care of this patient.  Please contact me with any questions or concerns.    This document has been electronically signed by Sia Sunshine MD on August 12, 2021 12:00 CDT.    CC: GIL Aguilar

## 2021-08-12 NOTE — PROGRESS NOTES
[facility]  Gynecology  Procedure Note: Endometrial Biopsy    Date of procedure: 8/12/2021    LMP: Patient's last menstrual period was 07/20/2021 (exact date).    Procedure documentation:    The cervix was grasped anterior at the 12 o'clock position.  Due to cervical stenosis and patient intolerance of procedure, aborted.  Tenaculum was removed from the cervix with scant bleeding. She tolerated the procedure well.    Post procedure instructions: If there is any significant fever or excessive bleeding or pain she is to call immediately.    Sia Sunshine MD  8/12/2021  11:34 CDT

## 2021-08-17 DIAGNOSIS — M54.42 CHRONIC BILATERAL LOW BACK PAIN WITH BILATERAL SCIATICA: ICD-10-CM

## 2021-08-17 DIAGNOSIS — K21.9 GASTROESOPHAGEAL REFLUX DISEASE: ICD-10-CM

## 2021-08-17 DIAGNOSIS — G89.29 CHRONIC BILATERAL LOW BACK PAIN WITH BILATERAL SCIATICA: ICD-10-CM

## 2021-08-17 DIAGNOSIS — E55.9 VITAMIN D DEFICIENCY: ICD-10-CM

## 2021-08-17 DIAGNOSIS — M54.2 CHRONIC MIDLINE POSTERIOR NECK PAIN: ICD-10-CM

## 2021-08-17 DIAGNOSIS — R11.0 NAUSEA: ICD-10-CM

## 2021-08-17 DIAGNOSIS — G89.29 CHRONIC MIDLINE POSTERIOR NECK PAIN: ICD-10-CM

## 2021-08-17 DIAGNOSIS — M54.41 CHRONIC BILATERAL LOW BACK PAIN WITH BILATERAL SCIATICA: ICD-10-CM

## 2021-08-18 LAB
LAB AP CASE REPORT: NORMAL
PATH INTERP SPEC-IMP: NORMAL

## 2021-08-19 ENCOUNTER — TELEPHONE (OUTPATIENT)
Dept: FAMILY MEDICINE CLINIC | Facility: CLINIC | Age: 31
End: 2021-08-19

## 2021-08-19 RX ORDER — PANTOPRAZOLE SODIUM 40 MG/1
40 TABLET, DELAYED RELEASE ORAL DAILY
Qty: 90 TABLET | Refills: 1 | Status: SHIPPED | OUTPATIENT
Start: 2021-08-19 | End: 2021-10-05 | Stop reason: SDUPTHER

## 2021-08-19 RX ORDER — ONDANSETRON 4 MG/1
4 TABLET, FILM COATED ORAL EVERY 8 HOURS PRN
Qty: 60 TABLET | Refills: 3 | Status: SHIPPED | OUTPATIENT
Start: 2021-08-19 | End: 2022-02-23 | Stop reason: SDUPTHER

## 2021-08-19 RX ORDER — FAMOTIDINE 40 MG/1
40 TABLET, FILM COATED ORAL DAILY
Qty: 90 TABLET | Refills: 1 | Status: SHIPPED | OUTPATIENT
Start: 2021-08-19 | End: 2021-11-16 | Stop reason: SDUPTHER

## 2021-08-19 RX ORDER — HYDROCODONE BITARTRATE AND ACETAMINOPHEN 7.5; 325 MG/1; MG/1
1 TABLET ORAL 2 TIMES DAILY PRN
Qty: 45 TABLET | Refills: 0 | Status: SHIPPED | OUTPATIENT
Start: 2021-08-19 | End: 2021-10-05 | Stop reason: SDUPTHER

## 2021-08-19 NOTE — TELEPHONE ENCOUNTER
Patient seen in office every 3 months for chronic pain requiring opiate pain medication. Compliant with medication, visits with no adverse effects noted. FILEMON and UDS current and appropriate. Patient called requesting scheduled refill at appropriate interval. Patient understands the risks associated with this controlled medication, including tolerance and addiction.  Patient also agrees to only obtain this medication from me, and not from a another provider, unless that provider is covering for me in my absence.  Patient also agrees to be compliant in dosing, and not self adjust the dose of medication.  A signed controlled substance agreement is on file, and the patient has received a controlled substance education sheet at this a previous visit.  The patient has also signed a consent for treatment with a controlled substance as per [facility] policy. FILEMON was obtained.   Refill sent for hydrocodone.     This document has been electronically signed by GIL Darling on August 19, 2021 12:23 CDT

## 2021-08-19 NOTE — TELEPHONE ENCOUNTER
Patient seen in office every 3 months for chronic pain requiring opiate pain medication. Compliant with medication, visits with no adverse effects noted. FILEMON and UDS current and appropriate. Patient called requesting scheduled refill at appropriate interval. Patient understands the risks associated with this controlled medication, including tolerance and addiction.  Patient also agrees to only obtain this medication from me, and not from a another provider, unless that provider is covering for me in my absence.  Patient also agrees to be compliant in dosing, and not self adjust the dose of medication.  A signed controlled substance agreement is on file, and the patient has received a controlled substance education sheet at this a previous visit.  The patient has also signed a consent for treatment with a controlled substance as per [facility] policy. FILEMON was obtained.   Refill sent for hydrocodone.     This document has been electronically signed by GIL Darling on August 19, 2021 12:15 CDT

## 2021-08-30 ENCOUNTER — APPOINTMENT (OUTPATIENT)
Dept: ONCOLOGY | Facility: CLINIC | Age: 31
End: 2021-08-30

## 2021-08-30 ENCOUNTER — APPOINTMENT (OUTPATIENT)
Dept: ONCOLOGY | Facility: HOSPITAL | Age: 31
End: 2021-08-30

## 2021-09-02 ENCOUNTER — PRIOR AUTHORIZATION (OUTPATIENT)
Dept: FAMILY MEDICINE CLINIC | Facility: CLINIC | Age: 31
End: 2021-09-02

## 2021-09-02 NOTE — TELEPHONE ENCOUNTER
MARIA T DUKES Key: [id number] - Rx #: [id number] help? Call us at [phone]  Archivedon August 25  HYDROcodone-Acetaminophen 10-325MG tablets  Form [employer] [place] Medicaid ePA Form 2017 NCPDP  Pa not sent medication in chart is HYDROcodone-acetaminophen (NORCO) 7.5-325 MG per tablet

## 2021-10-05 ENCOUNTER — TELEPHONE (OUTPATIENT)
Dept: FAMILY MEDICINE CLINIC | Facility: CLINIC | Age: 31
End: 2021-10-05

## 2021-10-05 DIAGNOSIS — M54.41 CHRONIC BILATERAL LOW BACK PAIN WITH BILATERAL SCIATICA: ICD-10-CM

## 2021-10-05 DIAGNOSIS — M54.2 CHRONIC MIDLINE POSTERIOR NECK PAIN: ICD-10-CM

## 2021-10-05 DIAGNOSIS — J45.20 MILD INTERMITTENT ASTHMA WITHOUT COMPLICATION: ICD-10-CM

## 2021-10-05 DIAGNOSIS — G89.29 CHRONIC MIDLINE POSTERIOR NECK PAIN: ICD-10-CM

## 2021-10-05 DIAGNOSIS — M54.42 CHRONIC BILATERAL LOW BACK PAIN WITH BILATERAL SCIATICA: ICD-10-CM

## 2021-10-05 DIAGNOSIS — K21.9 GASTROESOPHAGEAL REFLUX DISEASE: ICD-10-CM

## 2021-10-05 DIAGNOSIS — G89.29 CHRONIC BILATERAL LOW BACK PAIN WITH BILATERAL SCIATICA: ICD-10-CM

## 2021-10-05 RX ORDER — ALBUTEROL SULFATE 90 UG/1
2 AEROSOL, METERED RESPIRATORY (INHALATION) EVERY 6 HOURS PRN
Qty: 18 G | Refills: 11 | Status: SHIPPED | OUTPATIENT
Start: 2021-10-05 | End: 2021-11-16 | Stop reason: SDUPTHER

## 2021-10-05 RX ORDER — HYDROCODONE BITARTRATE AND ACETAMINOPHEN 7.5; 325 MG/1; MG/1
1 TABLET ORAL 2 TIMES DAILY PRN
Qty: 45 TABLET | Refills: 0 | Status: SHIPPED | OUTPATIENT
Start: 2021-10-05 | End: 2021-11-16 | Stop reason: SDUPTHER

## 2021-10-05 RX ORDER — PANTOPRAZOLE SODIUM 40 MG/1
40 TABLET, DELAYED RELEASE ORAL DAILY
Qty: 90 TABLET | Refills: 1 | Status: SHIPPED | OUTPATIENT
Start: 2021-10-05 | End: 2021-11-16 | Stop reason: SDUPTHER

## 2021-10-12 ENCOUNTER — TELEPHONE (OUTPATIENT)
Dept: OBSTETRICS AND GYNECOLOGY | Facility: CLINIC | Age: 31
End: 2021-10-12

## 2021-10-12 DIAGNOSIS — N88.2 CERVICAL STENOSIS (UTERINE CERVIX): Primary | ICD-10-CM

## 2021-10-12 NOTE — TELEPHONE ENCOUNTER
"Called and spoke with patient regarding results. Let her know US was normal and advised her to keep appt with cardiology to follow up.    Patient states she is still wanting to do surgery but \"prefers to wait until January after the holidays, and would like to be seen in [place]\".  Got her scheduled to see dr benito in [place] for EMB and let patient know that orders are in for her ultrasound and she can call and schedule that at her convenience.  Patient verbalized understanding  "

## 2021-10-12 NOTE — TELEPHONE ENCOUNTER
----- Message from Sia Sunshine MD sent at 10/11/2021 11:56 AM CDT -----  Please let her know that the ultrasound of her heart was normal but she should still see Cardiology to make sure safe for surgery.  Will follow-up recommendations from them.  If she still wants a hysterectomy, she needs to reschedule her appointment for EMB and she should take the Cytotec as prescribed the night before/day of her biopsy.  She also needs to do her pelvic ultrasound too.

## 2021-10-22 ENCOUNTER — OFFICE VISIT (OUTPATIENT)
Dept: CARDIOLOGY | Facility: CLINIC | Age: 31
End: 2021-10-22

## 2021-10-22 VITALS
WEIGHT: 250.8 LBS | HEART RATE: 105 BPM | SYSTOLIC BLOOD PRESSURE: 110 MMHG | OXYGEN SATURATION: 98 % | DIASTOLIC BLOOD PRESSURE: 84 MMHG | HEIGHT: 62 IN | BODY MASS INDEX: 46.15 KG/M2

## 2021-10-22 DIAGNOSIS — Z01.810 PRE-OPERATIVE CARDIOVASCULAR EXAMINATION: Primary | ICD-10-CM

## 2021-10-22 PROCEDURE — 93000 ELECTROCARDIOGRAM COMPLETE: CPT | Performed by: INTERNAL MEDICINE

## 2021-10-22 PROCEDURE — 99213 OFFICE O/P EST LOW 20 MIN: CPT | Performed by: NURSE PRACTITIONER

## 2021-10-22 NOTE — PROGRESS NOTES
Pre-op Exam (chief complaint)      History of Present Illness    Mrs. Marizol Elkins is a 30-year-old  female with PMH of anxiety, arthritis, bipolar disease, PCOS, prediabetes, iron deficiency anemia who presents today for evaluation of questionable pulmonary arterial hypertension.  Patient was evaluated by Dr. Sia Sunshine for heavy menses in the setting of iron deficiency anemia.  Patient follows with Dr. Alexander; and has received infusions of Injectafer.  Surgical options were discussed with patient including endometrial ablation and hysterectomy.  While being evaluated her chart there was a questionable diagnosis of pulmonary hypertension.  Dr. Sia Sunshine patient ordered echocardiogram, BNP and referred to cardiology for clearance prior to surgery.      proBNP-normal  Echocardiogram showing normal biventricular function and dimensions there is no evidence of diastolic dysfunction present, normal valvular function, RSVP is normal at 15.8 mmHg with no evidence of pulmonary hypertension present.    Patient denies any cardiac symptoms such as chest pain, palpitations, shortness of breath, edema, dizziness or syncope.  EKG today in office showing normal sinus rhythm, normal EKG, no acute st-t changes. Patient denies any previous complications with anesthesia.     Mother family hx- HTN, glaucoma.     Cardiac Risk Factors: obesity      Past Medical History:   Diagnosis Date   • Anxiety    • Arthritis    • Asthma    • Bipolar disease, chronic (HCC)    • Endometriosis    • Gonorrhea    • Kidney stone    • Mild depression (HCC)    • Polycystic ovary syndrome    • PONV (postoperative nausea and vomiting)    • Prediabetes      Past Surgical History:   Procedure Laterality Date   •  SECTION     • SALPINGO OOPHORECTOMY Right 2010    Large ovarian cyst, ?R side, at time of  removed   • TONSILLECTOMY AND ADENOIDECTOMY     • WISDOM TOOTH EXTRACTION       Social History     Socioeconomic History   •  Marital status:    Tobacco Use   • Smoking status: Current Every Day Smoker     Packs/day: 1.00     Years: 10.00     Pack years: 10.00   • Smokeless tobacco: Never Used   Substance and Sexual Activity   • Alcohol use: No   • Drug use: No   • Sexual activity: Yes     Partners: Male     Birth control/protection: None     Family History   Problem Relation Age of Onset   • Ovarian cancer Mother    • Diabetes Father    • Heart disease Father    • Hypertension Maternal Grandmother    • Breast cancer Paternal Grandmother    • Colon cancer Neg Hx    • Endometrial cancer Neg Hx    • Ovarian cancer Neg Hx        ALLERGIES:  Allergies   Allergen Reactions   • Penicillins Anaphylaxis   • Tramadol Anaphylaxis and Rash   • Fluoxetine Nausea Only and Other (See Comments)     Headaches   • Latex Swelling and Hives   • Bupropion Other (See Comments)     Makes her feel bad   • Celexa [Citalopram Hydrobromide] Anxiety   • Codeine Rash   • Cymbalta [Duloxetine Hcl] Anxiety   • Effexor [Venlafaxine] Anxiety   • Geodon [Ziprasidone Hcl] Anxiety   • Lamictal [Lamotrigine] Anxiety   • Latuda [Lurasidone Hcl] Anxiety   • Paxil [Paroxetine Hcl] Anxiety   • Viibryd [Vilazodone Hcl] Anxiety   • Zoloft [Sertraline Hcl] Anxiety         Review of Systems   Constitutional: Negative for chills, fever, malaise/fatigue and weight gain.   HENT: Negative for nosebleeds and tinnitus.    Eyes: Negative for blurred vision and double vision.   Cardiovascular: Negative for chest pain, dyspnea on exertion, irregular heartbeat, leg swelling, palpitations and syncope.   Respiratory: Negative for cough, shortness of breath, sleep disturbances due to breathing and snoring.    Endocrine: Negative for polydipsia, polyphagia and polyuria.   Hematologic/Lymphatic: Negative for bleeding problem. Does not bruise/bleed easily.   Skin: Negative for color change and suspicious lesions.   Musculoskeletal: Negative for falls and myalgias.   Gastrointestinal:  Negative for bloating, heartburn and hematochezia.   Genitourinary: Negative for dysuria and hematuria.   Neurological: Negative for dizziness, headaches, seizures, vertigo and weakness.   Psychiatric/Behavioral: Negative for altered mental status and depression. The patient does not have insomnia and is not nervous/anxious.    Allergic/Immunologic: Negative for environmental allergies and persistent infections.       Current Outpatient Medications   Medication Sig Dispense Refill   • famotidine (PEPCID) 40 MG tablet Take 1 tablet by mouth Daily. 90 tablet 1   • HYDROcodone-acetaminophen (NORCO) 7.5-325 MG per tablet Take 1 tablet by mouth 2 (Two) Times a Day As Needed for Moderate Pain  (only when necessary). 45 tablet 0   • metFORMIN (Glucophage) 1000 MG tablet Take 1 tablet by mouth 2 (Two) Times a Day With Meals. 60 tablet 5   • miSOPROStol (Cytotec) 200 MCG tablet Place 1 tablet in the vagina 8 hours prior to biopsy; place 1 tablet in the vagina 4 hours prior to biopsy. 2 tablet 0   • naproxen (Naprosyn) 500 MG tablet Take 1 tablet by mouth 2 (Two) Times a Day As Needed for Mild Pain  (back pain, menstrual pain). 60 tablet 5   • ondansetron (ZOFRAN) 4 MG tablet Take 1 tablet by mouth Every 8 (Eight) Hours As Needed for Nausea or Vomiting. 60 tablet 3   • pantoprazole (PROTONIX) 40 MG EC tablet Take 1 tablet by mouth Daily. For acid reflux 90 tablet 1   • Ventolin  (90 Base) MCG/ACT inhaler Inhale 2 puffs Every 6 (Six) Hours As Needed for Wheezing or Shortness of Air. Ventolin name brand only. 18 g 11   • calcipotriene (DOVONEX) 0.005 % cream Apply  topically to the appropriate area as directed 2 (Two) Times a Day. 60 g 5   • cholecalciferol (VITAMIN D3) 1.25 MG (90050 UT) capsule Take 1 capsule by mouth 2 (Two) Times a Week. Must have vit D level collected in May when out of refills. 8 capsule 2   • folic acid (FOLVITE) 1 MG tablet      • vitamin B-12 (CYANOCOBALAMIN) 1000 MCG tablet Take 1 tablet by  "mouth Daily. 90 tablet 1     No current facility-administered medications for this visit.       OBJECTIVE:    Physical Exam:   Vitals reviewed.   Constitutional:       General: Not in acute distress.     Appearance: Well-groomed. Obese. Not toxic-appearing or diaphoretic.   Eyes:      General: Lids are normal.      Conjunctiva/sclera: Conjunctivae normal.   HENT:      Head: Normocephalic and atraumatic.      Right Ear: External ear normal.      Left Ear: External ear normal.   Neck:      Vascular: No JVD.   Pulmonary:      Effort: Pulmonary effort is normal. No respiratory distress.      Breath sounds: Normal breath sounds. No decreased breath sounds. No wheezing. No rales.   Chest:      Chest wall: Not tender to palpatation.   Cardiovascular:      PMI at left midclavicular line. Normal rate. Regular rhythm. Normal S1 with normal intensity. Normal S2 with normal intensity.      Murmurs: There is no murmur.      No gallop. No S3 and S4 gallop. No click. No rub.   Pulses:     Intact distal pulses. No decreased pulses.   Edema:     Peripheral edema absent.   Abdominal:      General: Bowel sounds are normal. There is no distension.      Palpations: Abdomen is soft.      Tenderness: There is no abdominal tenderness.   Musculoskeletal:      Cervical back: Normal range of motion and neck supple. Skin:     General: Skin is warm and dry.      Coloration: Skin is not pale.      Findings: No erythema or rash.   Neurological:      Mental Status: Alert and oriented to person, place, and time.      Gait: Gait normal.   Psychiatric:         Behavior: Behavior normal.         Thought Content: Thought content normal.         Judgment: Judgment normal.       Vitals:    10/22/21 0916   BP: 110/84   BP Location: Left arm   Patient Position: Sitting   Cuff Size: Adult   Pulse: 105   SpO2: 98%   Weight: 114 kg (250 lb 12.8 oz)   Height: 157.5 cm (62\")       DATA REVIEWED:   Results for orders placed in visit on 10/06/21    Adult " Transthoracic Echo Complete w/ Color, Spectral and Contrast if necessary per protocol    Interpretation Summary  · Left ventricular ejection fraction appears to be 66 - 70%. Left ventricular systolic function is normal.  · Left ventricular diastolic function was normal.  · Estimated right ventricular systolic pressure from tricuspid regurgitation is normal (<35 mmHg).      No radiology results for the last 30 days.    Labs: BMP, CBC, LIPID, TSH  Lab Results   Component Value Date    GLUCOSE 106 (H) 07/19/2021    CALCIUM 8.5 (L) 07/19/2021     07/19/2021    K 3.6 07/19/2021    CO2 22.0 07/19/2021     07/19/2021    BUN 8 07/19/2021    CREATININE 0.80 07/19/2021    EGFRIFNONA 83 07/19/2021    BCR 10.0 07/19/2021    ANIONGAP 13.0 07/19/2021     Lab Results   Component Value Date    WBC 9.93 07/19/2021    HGB 14.1 07/19/2021    HCT 40.5 07/19/2021    MCV 89.4 07/19/2021     07/19/2021     Lab Results   Component Value Date    CHOL 197 07/19/2021    CHOL 188 02/01/2021    CHOL 191 08/11/2020     Lab Results   Component Value Date    TRIG 244 (H) 07/19/2021    TRIG 310 (H) 02/01/2021    TRIG 347 (H) 08/11/2020     Lab Results   Component Value Date    HDL 40 07/19/2021    HDL 42 02/01/2021    HDL 41 08/11/2020     No components found for: LDLCALC  Lab Results   Component Value Date     (H) 07/19/2021    LDL 94 02/01/2021    LDL 81 08/11/2020     No results found for: HDLLDLRATIO  No components found for: CHOLHDL  Lab Results   Component Value Date    TSH 2.280 02/01/2021     No results found for: PROBNP  EKG:         TTE:     Left Ventricle Left ventricular ejection fraction appears to be 66 - 70%. Left ventricular systolic function is normal.   Septal wall motion is normal. Normal left ventricular cavity size and wall thickness noted. All left ventricular wall segments contract normally. Left ventricular diastolic function was normal.   Right Ventricle Normal right ventricular cavity size, wall  thickness, systolic function and septal motion noted.   Left Atrium Normal left atrial size and volume noted.   Right Atrium Normal right atrial cavity size noted. The inferior vena cava is normally sized. Normal IVC inspiratory collapse of greater than 50% noted. Normal IVC flow pattern noted.   Aortic Valve The aortic valve is not well visualized. The aortic valve is grossly normal in structure. The aortic valve was poorly visualized but appears trileaflet. No significant aortic valve regurgitation is present. No hemodynamically significant aortic valve stenosis is present.   Mitral Valve The mitral valve is grossly normal in structure. Trace mitral valve regurgitation is present. No significant mitral valve stenosis is present.   Tricuspid Valve Physiologic tricuspid valve regurgitation is present. Estimated right ventricular systolic pressure from tricuspid regurgitation is normal (<35 mmHg). No evidence of pulmonary hypertension is present. No evidence of significant tricuspid valve stenosis is present.   Pulmonic Valve The pulmonic valve is grossly normal in structure. There is trace pulmonic valve regurgitation present. There is no pulmonic valve stenosis present.   Greater Vessels No dilation of the aortic root is present. No dilation of the sinuses of Valsalva is present.   Pericardium There is no evidence of pericardial effusion. . There is evidence of a fat pad present.         The following portions of the patient's history were reviewed and updated as appropriate: allergies, current medications, past family history, past medical history, past social history, past surgical history and problem list.  Old records reviewed and pertinent information is included in the above objective data.     ASSESSMENT/PLAN:       Diagnosis Plan   1. Pre-operative cardiovascular examination  ECG 12 Lead     This is a 30-year-old  female with above medical history presenting with questionable history of pulmonary  "artery hypertension.  Patient denies known history of this.  Likely was entered incorrectly into medical history.  Patient is asymptomatic and doing well      proBNP-normal     Echocardiogram showing normal biventricular function and dimensions there is no evidence of diastolic dysfunction present, normal valvular function, RSVP is normal at 15.8 mmHg with no evidence of pulmonary hypertension present.    Pre-Op Evaluation Assessment  30 y.o. female with planned surgery: endometrial ablation and hysterectomy  Known risk factors for perioperative complications: Anemia.      Cardiac Risk Estimation: per the Revised Cardiac Risk Index (Circ. 100:1043, 1999), the patient's risk factors for cardiac complications include \"high-risk\" surgery (intraperitoneal, intrathoracic, or suprainguinal vascular), putting her in: RCI RISK CLASS II (1 risk factor, risk of major cardiac compl. appr. 1.3%).     Pre-Op Evaluation Plan  1. Preoperative workup as follows ECG. EKG normal today in office. Echocardiogram unremarkable as discussed above.  2. Change in medication regimen before surgery: none, continue medication regimen including morning of surgery, with sip of water.  3. Prophylaxis for cardiac events with perioperative beta-blockers: not indicated.  4. Invasive hemodynamic monitoring perioperatively: at the discretion of anesthesiologist.  5. Deep vein thrombosis prophylaxis:regimen to be chosen by surgical team.      Cardiovascular Risk Estimates provided by the RCRI are provisional and no guarantee of outcome. [facility] does not provide \"surgical clearance,\" but only provides a risk assessment and management options.  The final decision for operative intervention is at the discretion of the operating physician.    Marizol WATKINS  reports that she has been smoking. She has a 10.00 pack-year smoking history. She has never used smokeless tobacco.. I have educated her on the risk of diseases from using tobacco products " such as cancer, COPD and heart disease.     I advised her to quit and she is not willing to quit.    I spent 3.5 minutes counseling the patient.         Follow up: PRN.             This document has been electronically signed by GIL Parikh on October 22, 2021 13:01 CDT

## 2021-10-31 LAB
QT INTERVAL: 372 MS
QTC INTERVAL: 462 MS

## 2021-11-16 DIAGNOSIS — M54.41 CHRONIC BILATERAL LOW BACK PAIN WITH BILATERAL SCIATICA: ICD-10-CM

## 2021-11-16 DIAGNOSIS — K21.9 GASTROESOPHAGEAL REFLUX DISEASE: ICD-10-CM

## 2021-11-16 DIAGNOSIS — E16.1 HYPERINSULINEMIA: ICD-10-CM

## 2021-11-16 DIAGNOSIS — G89.29 CHRONIC MIDLINE POSTERIOR NECK PAIN: ICD-10-CM

## 2021-11-16 DIAGNOSIS — M54.2 CHRONIC MIDLINE POSTERIOR NECK PAIN: ICD-10-CM

## 2021-11-16 DIAGNOSIS — J45.20 MILD INTERMITTENT ASTHMA WITHOUT COMPLICATION: ICD-10-CM

## 2021-11-16 DIAGNOSIS — G89.29 CHRONIC BILATERAL LOW BACK PAIN WITH BILATERAL SCIATICA: ICD-10-CM

## 2021-11-16 DIAGNOSIS — M54.42 CHRONIC BILATERAL LOW BACK PAIN WITH BILATERAL SCIATICA: ICD-10-CM

## 2021-11-18 ENCOUNTER — TELEPHONE (OUTPATIENT)
Dept: FAMILY MEDICINE CLINIC | Facility: CLINIC | Age: 31
End: 2021-11-18

## 2021-11-18 RX ORDER — ALBUTEROL SULFATE 90 UG/1
2 AEROSOL, METERED RESPIRATORY (INHALATION) EVERY 6 HOURS PRN
Qty: 18 G | Refills: 11 | Status: SHIPPED | OUTPATIENT
Start: 2021-11-18 | End: 2022-02-23 | Stop reason: SDUPTHER

## 2021-11-18 RX ORDER — FAMOTIDINE 40 MG/1
40 TABLET, FILM COATED ORAL DAILY
Qty: 90 TABLET | Refills: 1 | Status: SHIPPED | OUTPATIENT
Start: 2021-11-18 | End: 2022-01-24 | Stop reason: SDUPTHER

## 2021-11-18 RX ORDER — PANTOPRAZOLE SODIUM 40 MG/1
40 TABLET, DELAYED RELEASE ORAL DAILY
Qty: 90 TABLET | Refills: 1 | Status: SHIPPED | OUTPATIENT
Start: 2021-11-18 | End: 2022-01-24 | Stop reason: SDUPTHER

## 2021-11-18 RX ORDER — HYDROCODONE BITARTRATE AND ACETAMINOPHEN 7.5; 325 MG/1; MG/1
1 TABLET ORAL 2 TIMES DAILY PRN
Qty: 45 TABLET | Refills: 0 | Status: SHIPPED | OUTPATIENT
Start: 2021-11-18 | End: 2021-12-21 | Stop reason: SDUPTHER

## 2021-11-18 NOTE — TELEPHONE ENCOUNTER
Patient seen in office every 3 months for chronic pain requiring opiate pain medication. Compliant with medication, visits with no adverse effects noted. FILEMON and UDS current and appropriate. Patient called requesting scheduled refill at appropriate interval. Patient understands the risks associated with this controlled medication, including tolerance and addiction.  Patient also agrees to only obtain this medication from me, and not from a another provider, unless that provider is covering for me in my absence.  Patient also agrees to be compliant in dosing, and not self adjust the dose of medication.  A signed controlled substance agreement is on file, and the patient has received a controlled substance education sheet at this a previous visit.  The patient has also signed a consent for treatment with a controlled substance as per [facility] policy. FILEMON was obtained.   Refill sent for hydrocodone.     This document has been electronically signed by GIL Darling on November 18, 2021 16:11 CST

## 2021-11-23 ENCOUNTER — OFFICE VISIT (OUTPATIENT)
Dept: FAMILY MEDICINE CLINIC | Facility: CLINIC | Age: 31
End: 2021-11-23

## 2021-11-23 VITALS
OXYGEN SATURATION: 99 % | HEART RATE: 86 BPM | BODY MASS INDEX: 46.45 KG/M2 | RESPIRATION RATE: 16 BRPM | SYSTOLIC BLOOD PRESSURE: 128 MMHG | TEMPERATURE: 98.6 F | DIASTOLIC BLOOD PRESSURE: 80 MMHG | HEIGHT: 62 IN | WEIGHT: 252.4 LBS

## 2021-11-23 DIAGNOSIS — M54.2 CHRONIC MIDLINE POSTERIOR NECK PAIN: ICD-10-CM

## 2021-11-23 DIAGNOSIS — M54.42 CHRONIC BILATERAL LOW BACK PAIN WITH BILATERAL SCIATICA: ICD-10-CM

## 2021-11-23 DIAGNOSIS — E66.01 MORBID (SEVERE) OBESITY DUE TO EXCESS CALORIES (HCC): ICD-10-CM

## 2021-11-23 DIAGNOSIS — G89.29 CHRONIC BILATERAL LOW BACK PAIN WITH BILATERAL SCIATICA: ICD-10-CM

## 2021-11-23 DIAGNOSIS — M54.41 CHRONIC BILATERAL LOW BACK PAIN WITH BILATERAL SCIATICA: ICD-10-CM

## 2021-11-23 DIAGNOSIS — Z23 NEED FOR VACCINATION: Primary | ICD-10-CM

## 2021-11-23 DIAGNOSIS — G89.29 CHRONIC MIDLINE POSTERIOR NECK PAIN: ICD-10-CM

## 2021-11-23 PROCEDURE — 90686 IIV4 VACC NO PRSV 0.5 ML IM: CPT | Performed by: NURSE PRACTITIONER

## 2021-11-23 PROCEDURE — 99214 OFFICE O/P EST MOD 30 MIN: CPT | Performed by: NURSE PRACTITIONER

## 2021-11-23 PROCEDURE — 90471 IMMUNIZATION ADMIN: CPT | Performed by: NURSE PRACTITIONER

## 2021-11-23 RX ORDER — PHENTERMINE HYDROCHLORIDE 37.5 MG/1
37.5 TABLET ORAL
Qty: 30 TABLET | Refills: 0 | Status: SHIPPED | OUTPATIENT
Start: 2021-11-23 | End: 2021-12-21 | Stop reason: SDUPTHER

## 2021-11-23 NOTE — PROGRESS NOTES
Subjective   Marizol WATKINS is a 31 y.o. female.       Chief Complaint   Patient presents with   • Follow-up     12 week         History of Present Illness     Here for routine follow-up of chronic lower back pain and chronic cervicalgia.  Pain secondary to osteoarthritic changes with degenerative disc disease of the C-spine and L-spine.  Pain is managed with 1-2 Lortab 7.5 mg/25 mg APAP tablets per day #45/30 days.  Reports adequate relief with current medication.  Not due for refill today last refill sent in on 2021.  Compliant with use.    Worsening obesity Body mass index is 46.16 kg/m².  Weight in office today is 252 pounds 6.4 ounces.  Height is 62 inches.  Last office visit she was prescribed Qsymia, which was cost prohibitive at over $300 per month.  She does not want to take topiramate short acting tablets due to previous side effects, but is interested in taking the other ingredient in Qsymia, phentermine to assist in beginning weight loss.  Cardiac eval ordered by Dr. Sunshine was negative.  Labs are stable.  Discussed prescribing requirements that I follow for phentermine.  She does qualify at this time for month 1 of 3 and phentermine will be prescribed.    Other complaints today.None  Parts of most recent relevant visit HPI, ROS  and PE may be carried forward and all are updated as appropriate for current situation.    Past Surgical History:   Procedure Laterality Date   •  SECTION     • SALPINGO OOPHORECTOMY Right 2010    Large ovarian cyst, ?R side, at time of  removed   • TONSILLECTOMY AND ADENOIDECTOMY     • WISDOM TOOTH EXTRACTION        Social History     Socioeconomic History   • Marital status:    Tobacco Use   • Smoking status: Current Every Day Smoker     Packs/day: 1.00     Years: 10.00     Pack years: 10.00   • Smokeless tobacco: Never Used   Substance and Sexual Activity   • Alcohol use: No   • Drug use: No   • Sexual activity: Yes     Partners: Male      Birth control/protection: None      The following portions of the patient's history were reviewed and updated as appropriate: allergies, current medications, past family history, past medical history, past social history, past surgical history and problem list.    Review of Systems   Constitutional: Positive for unexpected weight gain.   HENT: Negative.  Negative for congestion.    Eyes: Negative.  Negative for blurred vision, double vision, photophobia and visual disturbance.   Respiratory: Negative.  Negative for cough, shortness of breath, wheezing and stridor.    Cardiovascular: Negative.  Negative for chest pain, palpitations and leg swelling.   Gastrointestinal: Negative.  Negative for abdominal distention, abdominal pain, blood in stool, constipation, diarrhea, nausea, vomiting, GERD and indigestion.   Endocrine: Negative.  Negative for cold intolerance and heat intolerance.   Genitourinary: Negative.  Negative for dysuria, flank pain, frequency and urinary incontinence.   Musculoskeletal: Positive for back pain and neck pain. Negative for arthralgias.   Skin: Negative.    Allergic/Immunologic: Negative.  Negative for immunocompromised state.   Neurological: Negative.    Hematological: Negative.    Psychiatric/Behavioral: Negative for agitation, behavioral problems, decreased concentration, dysphoric mood, hallucinations, self-injury, sleep disturbance, suicidal ideas, negative for hyperactivity, depressed mood and stress. The patient is nervous/anxious.    All other systems reviewed and are negative.    PHQ-9 Depression Screening  Little interest or pleasure in doing things?     Feeling down, depressed, or hopeless?     Trouble falling or staying asleep, or sleeping too much?     Feeling tired or having little energy?     Poor appetite or overeating?     Feeling bad about yourself - or that you are a failure or have let yourself or your family down?     Trouble concentrating on things, such as reading the  "newspaper or watching television?     Moving or speaking so slowly that other people could have noticed? Or the opposite - being so fidgety or restless that you have been moving around a lot more than usual?     Thoughts that you would be better off dead, or of hurting yourself in some way?     PHQ-9 Total Score     If you checked off any problems, how difficult have these problems made it for you to do your work, take care of things at home, or get along with other people?      Patient understands the risks associated with this controlled medication, including tolerance and addiction.  Patient also agrees to only obtain this medication from me, and not from a another provider, unless that provider is covering for me in my absence.  Patient also agrees to be compliant in dosing, and not self adjust the dose of medication.  A signed controlled substance agreement is on file, and the patient has received a controlled substance education sheet at this a previous visit.  The patient has also signed a consent for treatment with a controlled substance as per [facility] policy. FILEMON was obtained.    Objective    Vitals:    11/23/21 0915   BP: 128/80   BP Location: Left arm   Patient Position: Sitting   Cuff Size: Adult   Pulse: 86   Resp: 16   Temp: 98.6 °F (37 °C)   SpO2: 99%   Weight: 114 kg (252 lb 6.4 oz)   Height: 157.5 cm (62\")   PainSc:   4   PainLoc: Back     Body mass index is 46.16 kg/m².    Physical Exam  Vitals and nursing note reviewed.   Constitutional:       General: She is not in acute distress.     Appearance: Normal appearance. She is well-developed. She is obese. She is not ill-appearing or diaphoretic.   HENT:      Head: Normocephalic and atraumatic.   Eyes:      General: No scleral icterus.        Right eye: No discharge.         Left eye: No discharge.      Conjunctiva/sclera: Conjunctivae normal.      Pupils: Pupils are equal, round, and reactive to light.   Neck:      Thyroid: No thyromegaly. "      Vascular: No carotid bruit or JVD.      Trachea: No tracheal deviation.   Cardiovascular:      Rate and Rhythm: Normal rate and regular rhythm.      Pulses: Normal pulses.      Heart sounds: Normal heart sounds. No murmur heard.  No friction rub. No gallop.    Pulmonary:      Effort: Pulmonary effort is normal. No respiratory distress.      Breath sounds: Normal breath sounds. No stridor. No wheezing, rhonchi or rales.   Chest:      Chest wall: No tenderness.   Abdominal:      General: Bowel sounds are normal.      Palpations: Abdomen is soft.   Musculoskeletal:         General: No tenderness or deformity. Normal range of motion.      Cervical back: Normal range of motion and neck supple. No rigidity or tenderness.      Right lower leg: No edema.      Left lower leg: No edema.   Lymphadenopathy:      Cervical: No cervical adenopathy.   Skin:     General: Skin is warm and dry.      Capillary Refill: Capillary refill takes 2 to 3 seconds.      Coloration: Skin is not jaundiced or pale.      Findings: No bruising, erythema, lesion or rash.   Neurological:      General: No focal deficit present.      Mental Status: She is alert and oriented to person, place, and time. Mental status is at baseline.      Motor: No weakness.      Gait: Gait normal.   Psychiatric:         Mood and Affect: Mood normal.         Behavior: Behavior normal.         Thought Content: Thought content normal.         Judgment: Judgment normal.       Stable chronic neck and back pain, no refill hydrocodone due today. Worsening morbid obesity, rx phentermine sent. Education as follows:  Increase intake of water to 6-8 glasses per day.  Cut out concentrated sugars and reduce simple carbs.  Count calories, measure servings  Use a smart phone melissa to help with counting calories and tracking weight loss, healthy lifestyle modifications.     Demonstrate at least a 1-2 pound / month weight loss with a healthy BP to continue on phentermine    Be aware  that my routine practice is to allow qualified persons take phentermine for 3 months consecutively, then a one month holiday off it to prove the presence of lifestyle changes which are sustained, and may repeat this cycle as long as qualified and appropriate for phentermine.    If BMI falls below 27, the cycle will not continue    Flu and pneumonia vaccine administered.    Assessment/Plan   Diagnoses and all orders for this visit:    1. Need for vaccination (Primary)  -     FluLaval/Fluarix/Fluzone >6 Months  -     pneumococcal polysaccharide 23-valent (PNEUMOVAX-23) vaccine 0.5 mL    2. Chronic bilateral low back pain with bilateral sciatica    3. Chronic midline posterior neck pain    4. Body mass index (BMI) of 45.0 to 49.9 in adult (HCC)  -     phentermine (ADIPEX-P) 37.5 MG tablet; Take 1 tablet by mouth Every Morning Before Breakfast.  Dispense: 30 tablet; Refill: 0    5. Morbid (severe) obesity due to excess calories (HCC)  -     phentermine (ADIPEX-P) 37.5 MG tablet; Take 1 tablet by mouth Every Morning Before Breakfast.  Dispense: 30 tablet; Refill: 0      Return in about 4 weeks (around 12/21/2021).               This document has been electronically signed by GIL Darling on November 23, 2021 10:07 CST

## 2021-12-21 ENCOUNTER — OFFICE VISIT (OUTPATIENT)
Dept: FAMILY MEDICINE CLINIC | Facility: CLINIC | Age: 31
End: 2021-12-21

## 2021-12-21 VITALS
HEIGHT: 62 IN | TEMPERATURE: 98.9 F | WEIGHT: 250.9 LBS | OXYGEN SATURATION: 99 % | HEART RATE: 86 BPM | BODY MASS INDEX: 46.17 KG/M2 | SYSTOLIC BLOOD PRESSURE: 122 MMHG | DIASTOLIC BLOOD PRESSURE: 78 MMHG | RESPIRATION RATE: 16 BRPM

## 2021-12-21 DIAGNOSIS — E66.01 MORBID (SEVERE) OBESITY DUE TO EXCESS CALORIES (HCC): ICD-10-CM

## 2021-12-21 DIAGNOSIS — G89.29 CHRONIC MIDLINE POSTERIOR NECK PAIN: ICD-10-CM

## 2021-12-21 DIAGNOSIS — G89.29 CHRONIC BILATERAL LOW BACK PAIN WITH BILATERAL SCIATICA: ICD-10-CM

## 2021-12-21 DIAGNOSIS — M54.41 CHRONIC BILATERAL LOW BACK PAIN WITH BILATERAL SCIATICA: ICD-10-CM

## 2021-12-21 DIAGNOSIS — M54.2 CHRONIC MIDLINE POSTERIOR NECK PAIN: ICD-10-CM

## 2021-12-21 DIAGNOSIS — M54.42 CHRONIC BILATERAL LOW BACK PAIN WITH BILATERAL SCIATICA: ICD-10-CM

## 2021-12-21 PROCEDURE — 99214 OFFICE O/P EST MOD 30 MIN: CPT | Performed by: NURSE PRACTITIONER

## 2021-12-21 RX ORDER — PHENTERMINE HYDROCHLORIDE 37.5 MG/1
37.5 TABLET ORAL
Qty: 30 TABLET | Refills: 0 | Status: SHIPPED | OUTPATIENT
Start: 2021-12-21 | End: 2022-01-28 | Stop reason: SDUPTHER

## 2021-12-21 RX ORDER — HYDROCODONE BITARTRATE AND ACETAMINOPHEN 7.5; 325 MG/1; MG/1
1 TABLET ORAL 2 TIMES DAILY PRN
Qty: 45 TABLET | Refills: 0 | Status: SHIPPED | OUTPATIENT
Start: 2021-12-21 | End: 2022-01-24 | Stop reason: SDUPTHER

## 2021-12-21 NOTE — PROGRESS NOTES
Subjective   Marizol WATKINS is a 31 y.o. female.       Chief Complaint   Patient presents with   • Weight Check        History of Present Illness     Body mass index is 45.89 kg/m².  Weight today is 250 pounds 14.4 ounces.  Height remains 62 inches.  This reflects a 2 pound weight loss since last month and she qualifies again for another 30 days of phentermine.  No adverse effects of medication are reported.  To continue counting calories and keep caloric intake between 1015 100 corina/day.  Increase water intake and cut out all sugary drinks.  Add weightbearing exercise such as walking 30 minutes briskly daily.    Also here for routine follow-up of chronic bilateral low back pain with bilateral sciatica and chronic midline posterior neck pain.  Pain is managed with hydrocodone due for refill today.  Reports adequate relief with current medications.  Compliant with use.    New complaints today: None.      Past Surgical History:   Procedure Laterality Date   •  SECTION     • SALPINGO OOPHORECTOMY Right 2010    Large ovarian cyst, ?R side, at time of  removed   • TONSILLECTOMY AND ADENOIDECTOMY     • WISDOM TOOTH EXTRACTION        Social History     Socioeconomic History   • Marital status:    Tobacco Use   • Smoking status: Current Every Day Smoker     Packs/day: 1.00     Years: 10.00     Pack years: 10.00   • Smokeless tobacco: Never Used   Substance and Sexual Activity   • Alcohol use: No   • Drug use: No   • Sexual activity: Yes     Partners: Male     Birth control/protection: None      The following portions of the patient's history were reviewed and updated as appropriate: allergies, current medications, past family history, past medical history, past social history, past surgical history and problem list.    Review of Systems   Constitutional: Negative.    HENT: Negative.  Negative for congestion.    Eyes: Negative.  Negative for blurred vision, double vision, photophobia and visual  disturbance.   Respiratory: Negative.  Negative for cough, shortness of breath, wheezing and stridor.    Cardiovascular: Negative.  Negative for chest pain, palpitations and leg swelling.   Gastrointestinal: Negative.  Negative for abdominal distention, abdominal pain, blood in stool, constipation, diarrhea, nausea, vomiting, GERD and indigestion.   Endocrine: Negative.  Negative for cold intolerance and heat intolerance.   Genitourinary: Negative.  Negative for dysuria, flank pain, frequency and urinary incontinence.   Musculoskeletal: Positive for back pain. Negative for arthralgias.   Skin: Negative.    Allergic/Immunologic: Negative.  Negative for immunocompromised state.   Neurological: Negative.    Hematological: Negative.    Psychiatric/Behavioral: Negative.  Negative for agitation, behavioral problems, decreased concentration, dysphoric mood, hallucinations, self-injury, sleep disturbance, suicidal ideas, negative for hyperactivity, depressed mood and stress. The patient is not nervous/anxious.    All other systems reviewed and are negative.    PHQ-9 Depression Screening  Little interest or pleasure in doing things?     Feeling down, depressed, or hopeless?     Trouble falling or staying asleep, or sleeping too much?     Feeling tired or having little energy?     Poor appetite or overeating?     Feeling bad about yourself - or that you are a failure or have let yourself or your family down?     Trouble concentrating on things, such as reading the newspaper or watching television?     Moving or speaking so slowly that other people could have noticed? Or the opposite - being so fidgety or restless that you have been moving around a lot more than usual?     Thoughts that you would be better off dead, or of hurting yourself in some way?     PHQ-9 Total Score     If you checked off any problems, how difficult have these problems made it for you to do your work, take care of things at home, or get along with other  "people?      Patient understands the risks associated with this controlled medication, including tolerance and addiction.  Patient also agrees to only obtain this medication from me, and not from a another provider, unless that provider is covering for me in my absence.  Patient also agrees to be compliant in dosing, and not self adjust the dose of medication.  A signed controlled substance agreement is on file, and the patient has received a controlled substance education sheet at this a previous visit.  The patient has also signed a consent for treatment with a controlled substance as per [facility] policy. FILEMON was obtained.    Objective    Vitals:    12/21/21 1006   BP: 122/78   BP Location: Left arm   Patient Position: Sitting   Cuff Size: Adult   Pulse: 86   Resp: 16   Temp: 98.9 °F (37.2 °C)   SpO2: 99%   Weight: 114 kg (250 lb 14.4 oz)   Height: 157.5 cm (62\")   PainSc:   4   PainLoc: Back     Body mass index is 45.89 kg/m².    Physical Exam  Vitals and nursing note reviewed.   Constitutional:       General: She is not in acute distress.     Appearance: Normal appearance. She is well-developed. She is obese. She is not ill-appearing or diaphoretic.   HENT:      Head: Normocephalic and atraumatic.   Eyes:      General: No scleral icterus.        Right eye: No discharge.         Left eye: No discharge.      Conjunctiva/sclera: Conjunctivae normal.      Pupils: Pupils are equal, round, and reactive to light.   Neck:      Thyroid: No thyromegaly.      Vascular: No carotid bruit or JVD.      Trachea: No tracheal deviation.   Cardiovascular:      Rate and Rhythm: Normal rate and regular rhythm.      Pulses: Normal pulses.      Heart sounds: Normal heart sounds. No murmur heard.  No friction rub. No gallop.    Pulmonary:      Effort: Pulmonary effort is normal. No respiratory distress.      Breath sounds: Normal breath sounds. No stridor. No wheezing, rhonchi or rales.   Chest:      Chest wall: No tenderness. "   Abdominal:      General: Bowel sounds are normal.      Palpations: Abdomen is soft.   Musculoskeletal:         General: No tenderness or deformity. Normal range of motion.      Cervical back: Normal range of motion and neck supple. No rigidity or tenderness.        Back:       Right lower leg: No edema.      Left lower leg: No edema.      Comments: Chronic pain with lifting, bending exertion in these three areas. 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   Lymphadenopathy:      Cervical: No cervical adenopathy.   Skin:     General: Skin is warm and dry.      Capillary Refill: Capillary refill takes 2 to 3 seconds.      Coloration: Skin is not jaundiced or pale.      Findings: No bruising, erythema, lesion or rash.   Neurological:      General: No focal deficit present.      Mental Status: She is alert and oriented to person, place, and time. Mental status is at baseline.      Motor: No weakness.      Gait: Gait normal.   Psychiatric:         Mood and Affect: Mood normal.         Behavior: Behavior normal.         Thought Content: Thought content normal.         Judgment: Judgment normal.       Stable chronic neck, upper and lower back pain managed with low # hydrocodone monthly when needed, refill due.   Obesity, has lost 2# since last visit, but has difficulty taking it daily due to swinging shifts from nights to days when she is off, so takes it on days she works or if off and not working that night, or it ruins her sleep schedule.     Assessment/Plan   Diagnoses and all orders for this visit:    1. Body mass index (BMI) of 45.0 to 49.9 in adult (HCC)  -     phentermine (ADIPEX-P) 37.5 MG tablet; Take 1 tablet by mouth Every Morning Before Breakfast.  Dispense: 30 tablet; Refill: 0    2. Morbid (severe) obesity due to excess calories ([facility])  -     phentermine (ADIPEX-P) 37.5 MG tablet; Take 1 tablet by mouth Every Morning Before Breakfast.  Dispense:  30 tablet; Refill: 0    3. Chronic bilateral low back pain with bilateral sciatica  -     HYDROcodone-acetaminophen (NORCO) 7.5-325 MG per tablet; Take 1 tablet by mouth 2 (Two) Times a Day As Needed for Moderate Pain . For chronic low back pain M.54.41, M54.42  Dispense: 45 tablet; Refill: 0    4. Chronic midline posterior neck pain  -     HYDROcodone-acetaminophen (NORCO) 7.5-325 MG per tablet; Take 1 tablet by mouth 2 (Two) Times a Day As Needed for Moderate Pain . For chronic low back pain M.54.41, M54.42  Dispense: 45 tablet; Refill: 0    Return in about 4 weeks (around 1/18/2022), or if symptoms worsen or fail to improve.                 This document has been electronically signed by GIL Darling on December 21, 2021 10:35 CST

## 2022-01-11 RX ORDER — MISOPROSTOL 200 UG/1
TABLET ORAL
Qty: 2 TABLET | Refills: 0 | Status: SHIPPED | OUTPATIENT
Start: 2022-01-11 | End: 2022-05-31

## 2022-01-24 ENCOUNTER — TELEPHONE (OUTPATIENT)
Dept: FAMILY MEDICINE CLINIC | Facility: CLINIC | Age: 32
End: 2022-01-24

## 2022-01-24 DIAGNOSIS — M54.41 CHRONIC BILATERAL LOW BACK PAIN WITH BILATERAL SCIATICA: ICD-10-CM

## 2022-01-24 DIAGNOSIS — M54.42 CHRONIC BILATERAL LOW BACK PAIN WITH BILATERAL SCIATICA: ICD-10-CM

## 2022-01-24 DIAGNOSIS — G89.29 CHRONIC MIDLINE POSTERIOR NECK PAIN: ICD-10-CM

## 2022-01-24 DIAGNOSIS — Z79.899 ENCOUNTER FOR LONG-TERM (CURRENT) USE OF MEDICATIONS: ICD-10-CM

## 2022-01-24 DIAGNOSIS — R73.03 PREDIABETES: ICD-10-CM

## 2022-01-24 DIAGNOSIS — E78.1 HYPERTRIGLYCERIDEMIA: ICD-10-CM

## 2022-01-24 DIAGNOSIS — M54.2 CHRONIC MIDLINE POSTERIOR NECK PAIN: ICD-10-CM

## 2022-01-24 DIAGNOSIS — Z51.81 ENCOUNTER FOR THERAPEUTIC DRUG LEVEL MONITORING: ICD-10-CM

## 2022-01-24 DIAGNOSIS — K21.9 GASTROESOPHAGEAL REFLUX DISEASE: ICD-10-CM

## 2022-01-24 DIAGNOSIS — G89.29 CHRONIC BILATERAL LOW BACK PAIN WITH BILATERAL SCIATICA: ICD-10-CM

## 2022-01-24 DIAGNOSIS — E16.1 HYPERINSULINEMIA: Primary | ICD-10-CM

## 2022-01-24 DIAGNOSIS — D50.9 IRON DEFICIENCY ANEMIA, UNSPECIFIED IRON DEFICIENCY ANEMIA TYPE: Primary | ICD-10-CM

## 2022-01-24 RX ORDER — FAMOTIDINE 40 MG/1
40 TABLET, FILM COATED ORAL DAILY
Qty: 90 TABLET | Refills: 1 | Status: SHIPPED | OUTPATIENT
Start: 2022-01-24 | End: 2022-04-11 | Stop reason: SDUPTHER

## 2022-01-24 RX ORDER — PANTOPRAZOLE SODIUM 40 MG/1
40 TABLET, DELAYED RELEASE ORAL DAILY
Qty: 90 TABLET | Refills: 1 | Status: SHIPPED | OUTPATIENT
Start: 2022-01-24 | End: 2022-04-11 | Stop reason: SDUPTHER

## 2022-01-24 RX ORDER — HYDROCODONE BITARTRATE AND ACETAMINOPHEN 7.5; 325 MG/1; MG/1
1 TABLET ORAL 2 TIMES DAILY PRN
Qty: 45 TABLET | Refills: 0 | Status: SHIPPED | OUTPATIENT
Start: 2022-01-24 | End: 2022-02-23 | Stop reason: SDUPTHER

## 2022-01-25 NOTE — TELEPHONE ENCOUNTER
Patient seen in office every 3 months for chronic pain requiring opiate pain medication. Compliant with medication, visits with no adverse effects noted. FILEMON and UDS current and appropriate. Patient called requesting scheduled refill at appropriate interval. Patient understands the risks associated with this controlled medication, including tolerance and addiction.  Patient also agrees to only obtain this medication from me, and not from a another provider, unless that provider is covering for me in my absence.  Patient also agrees to be compliant in dosing, and not self adjust the dose of medication.  A signed controlled substance agreement is on file, and the patient has received a controlled substance education sheet at this a previous visit.  The patient has also signed a consent for treatment with a controlled substance as per [facility] policy. FILEMON was obtained.   Refill sent for hydrocodone.     This document has been electronically signed by GIL Darling on January 24, 2022 18:43 CST

## 2022-01-28 ENCOUNTER — OFFICE VISIT (OUTPATIENT)
Dept: FAMILY MEDICINE CLINIC | Facility: CLINIC | Age: 32
End: 2022-01-28

## 2022-01-28 VITALS
WEIGHT: 247.1 LBS | HEIGHT: 62 IN | OXYGEN SATURATION: 99 % | HEART RATE: 97 BPM | SYSTOLIC BLOOD PRESSURE: 126 MMHG | BODY MASS INDEX: 45.47 KG/M2 | DIASTOLIC BLOOD PRESSURE: 82 MMHG | RESPIRATION RATE: 16 BRPM

## 2022-01-28 DIAGNOSIS — E66.01 MORBID (SEVERE) OBESITY DUE TO EXCESS CALORIES: ICD-10-CM

## 2022-01-28 PROCEDURE — 99213 OFFICE O/P EST LOW 20 MIN: CPT | Performed by: NURSE PRACTITIONER

## 2022-01-28 RX ORDER — PHENTERMINE HYDROCHLORIDE 37.5 MG/1
37.5 TABLET ORAL
Qty: 30 TABLET | Refills: 0 | Status: SHIPPED | OUTPATIENT
Start: 2022-01-28 | End: 2022-03-24 | Stop reason: SDUPTHER

## 2022-01-28 NOTE — PROGRESS NOTES
Subjective   Marizol Elkins is a 31 y.o. female.       Chief Complaint   Patient presents with   • Weight Check        History of Present Illness   Body mass index is 45.2 kg/m².    Weight today is 247 pounds 1.6 ounces.  Height remains 62 inches.  This reflects a 3 pound 12.8 oz weight loss since last month and she qualifies again for another 30 days of phentermine.  No adverse effects of medication are reported.  This will be month 3 of 3 in a 3 month on, 1 month off phentermine schedule. Will not be eligible for refill in February, as this is the holiday month off phentermine to gauge effects and degree of lifestyle changes without stimulant. Will be eligible to requalify for 1st round of next 3 months cycle in March, if has continued to demonstrate 1-2 pounds weight loss during month off phentermine. If weight gain seen at March visit over today, will not qualify.     To continue counting calories and keep caloric intake between 2381-0493 corina/day.  Increase water intake and cut out all sugary drinks.  Add weightbearing exercise such as walking 30 minutes briskly daily.         Other complaints today: none.   Parts of most recent relevant visit HPI, ROS  and PE may be carried forward and all are updated as appropriate for current situation.    Past Surgical History:   Procedure Laterality Date   •  SECTION     • SALPINGO OOPHORECTOMY Right 2010    Large ovarian cyst, ?R side, at time of  removed   • TONSILLECTOMY AND ADENOIDECTOMY     • WISDOM TOOTH EXTRACTION        Social History     Socioeconomic History   • Marital status:    Tobacco Use   • Smoking status: Current Every Day Smoker     Packs/day: 1.00     Years: 10.00     Pack years: 10.00   • Smokeless tobacco: Never Used   Substance and Sexual Activity   • Alcohol use: No   • Drug use: No   • Sexual activity: Yes     Partners: Male     Birth control/protection: None      The following portions of the patient's history were  reviewed and updated as appropriate: allergies, current medications, past family history, past medical history, past social history, past surgical history and problem list.    Review of Systems   Constitutional: Negative.    HENT: Negative.  Negative for congestion.    Eyes: Negative.  Negative for blurred vision, double vision, photophobia and visual disturbance.   Respiratory: Negative.  Negative for cough, shortness of breath, wheezing and stridor.    Cardiovascular: Negative.  Negative for chest pain, palpitations and leg swelling.   Gastrointestinal: Negative.  Negative for abdominal distention, abdominal pain, blood in stool, constipation, diarrhea, nausea, vomiting, GERD and indigestion.   Endocrine: Negative.  Negative for cold intolerance and heat intolerance.   Genitourinary: Negative.  Negative for dysuria, flank pain, frequency and urinary incontinence.   Musculoskeletal: Positive for back pain. Negative for arthralgias.   Skin: Negative.    Allergic/Immunologic: Negative.  Negative for immunocompromised state.   Neurological: Negative.    Hematological: Negative.    Psychiatric/Behavioral: Negative.  Negative for agitation, behavioral problems, decreased concentration, dysphoric mood, hallucinations, self-injury, sleep disturbance, suicidal ideas, negative for hyperactivity, depressed mood and stress. The patient is not nervous/anxious.    All other systems reviewed and are negative.    PHQ-9 Depression Screening  Little interest or pleasure in doing things? 0   Feeling down, depressed, or hopeless? 0   Trouble falling or staying asleep, or sleeping too much?     Feeling tired or having little energy?     Poor appetite or overeating?     Feeling bad about yourself - or that you are a failure or have let yourself or your family down?     Trouble concentrating on things, such as reading the newspaper or watching television?     Moving or speaking so slowly that other people could have noticed? Or the  "opposite - being so fidgety or restless that you have been moving around a lot more than usual?     Thoughts that you would be better off dead, or of hurting yourself in some way?     PHQ-9 Total Score 0   If you checked off any problems, how difficult have these problems made it for you to do your work, take care of things at home, or get along with other people?      Patient understands the risks associated with this controlled medication, including tolerance and addiction.  Patient also agrees to only obtain this medication from me, and not from a another provider, unless that provider is covering for me in my absence.  Patient also agrees to be compliant in dosing, and not self adjust the dose of medication.  A signed controlled substance agreement is on file, and the patient has received a controlled substance education sheet at this a previous visit.  The patient has also signed a consent for treatment with a controlled substance as per [facility] policy. FILEMON was obtained.    Objective    Vitals:    01/28/22 1304   BP: 126/82   BP Location: Left arm   Patient Position: Sitting   Cuff Size: Adult   Pulse: 97   Resp: 16   SpO2: 99%   Weight: 112 kg (247 lb 1.6 oz)   Height: 157.5 cm (62\")   PainSc: 0-No pain     Body mass index is 45.2 kg/m².    Physical Exam  Vitals and nursing note reviewed.   Constitutional:       General: She is not in acute distress.     Appearance: Normal appearance. She is well-developed. She is obese. She is not ill-appearing or diaphoretic.   HENT:      Head: Normocephalic and atraumatic.   Eyes:      General: No scleral icterus.        Right eye: No discharge.         Left eye: No discharge.      Conjunctiva/sclera: Conjunctivae normal.      Pupils: Pupils are equal, round, and reactive to light.   Neck:      Thyroid: No thyromegaly.      Vascular: No carotid bruit or JVD.      Trachea: No tracheal deviation.   Cardiovascular:      Rate and Rhythm: Normal rate and regular " rhythm.      Pulses: Normal pulses.      Heart sounds: Normal heart sounds. No murmur heard.  No friction rub. No gallop.    Pulmonary:      Effort: Pulmonary effort is normal. No respiratory distress.      Breath sounds: Normal breath sounds. No stridor. No wheezing, rhonchi or rales.   Chest:      Chest wall: No tenderness.   Abdominal:      General: Bowel sounds are normal.      Palpations: Abdomen is soft.   Musculoskeletal:         General: No tenderness or deformity. Normal range of motion.      Cervical back: Normal range of motion and neck supple. No rigidity or tenderness.      Right lower leg: No edema.      Left lower leg: No edema.   Lymphadenopathy:      Cervical: No cervical adenopathy.   Skin:     General: Skin is warm and dry.      Capillary Refill: Capillary refill takes 2 to 3 seconds.      Coloration: Skin is not jaundiced or pale.      Findings: No bruising, erythema, lesion or rash.   Neurological:      General: No focal deficit present.      Mental Status: She is alert and oriented to person, place, and time. Mental status is at baseline.      Motor: No weakness.      Gait: Gait normal.   Psychiatric:         Mood and Affect: Mood normal.         Behavior: Behavior normal.         Thought Content: Thought content normal.         Judgment: Judgment normal.     gradual improvement in obesity with >3# weight loss in 1 month. Continue and increase current weight loss/ dieting/ exercise efforts. Phentermine month 3 of 3 prescription is sent in today. Follow up 2 months, no phentermine eligible in February and must demonstrate 1-2 # weight loss over next two months at next visit to requalify.      Assessment/Plan   Diagnoses and all orders for this visit:    1. Body mass index (BMI) of 45.0 to 49.9 in adult (HCC)  -     phentermine (ADIPEX-P) 37.5 MG tablet; Take 1 tablet by mouth Every Morning Before Breakfast.  Dispense: 30 tablet; Refill: 0    2. Morbid (severe) obesity due to excess calories  (HCC)  -     phentermine (ADIPEX-P) 37.5 MG tablet; Take 1 tablet by mouth Every Morning Before Breakfast.  Dispense: 30 tablet; Refill: 0      Return in about 2 months (around 3/28/2022).             This document has been electronically signed by GIL Darling on January 28, 2022 13:53 CST

## 2022-02-23 DIAGNOSIS — M54.2 CHRONIC MIDLINE POSTERIOR NECK PAIN: ICD-10-CM

## 2022-02-23 DIAGNOSIS — R11.0 NAUSEA: ICD-10-CM

## 2022-02-23 DIAGNOSIS — G89.29 CHRONIC MIDLINE POSTERIOR NECK PAIN: ICD-10-CM

## 2022-02-23 DIAGNOSIS — J45.20 MILD INTERMITTENT ASTHMA WITHOUT COMPLICATION: ICD-10-CM

## 2022-02-23 DIAGNOSIS — M54.41 CHRONIC BILATERAL LOW BACK PAIN WITH BILATERAL SCIATICA: ICD-10-CM

## 2022-02-23 DIAGNOSIS — M54.42 CHRONIC BILATERAL LOW BACK PAIN WITH BILATERAL SCIATICA: ICD-10-CM

## 2022-02-23 DIAGNOSIS — E16.1 HYPERINSULINEMIA: ICD-10-CM

## 2022-02-23 DIAGNOSIS — G89.29 CHRONIC BILATERAL LOW BACK PAIN WITH BILATERAL SCIATICA: ICD-10-CM

## 2022-02-24 RX ORDER — NAPROXEN 500 MG/1
500 TABLET ORAL 2 TIMES DAILY PRN
Qty: 60 TABLET | Refills: 5 | Status: SHIPPED | OUTPATIENT
Start: 2022-02-24 | End: 2022-05-10 | Stop reason: SDUPTHER

## 2022-02-24 RX ORDER — ONDANSETRON 4 MG/1
4 TABLET, FILM COATED ORAL EVERY 8 HOURS PRN
Qty: 60 TABLET | Refills: 3 | Status: SHIPPED | OUTPATIENT
Start: 2022-02-24 | End: 2022-05-10 | Stop reason: SDUPTHER

## 2022-02-24 RX ORDER — HYDROCODONE BITARTRATE AND ACETAMINOPHEN 7.5; 325 MG/1; MG/1
1 TABLET ORAL 2 TIMES DAILY PRN
Qty: 45 TABLET | Refills: 0 | Status: SHIPPED | OUTPATIENT
Start: 2022-02-24 | End: 2022-04-11 | Stop reason: SDUPTHER

## 2022-02-24 RX ORDER — ALBUTEROL SULFATE 90 UG/1
2 AEROSOL, METERED RESPIRATORY (INHALATION) EVERY 6 HOURS PRN
Qty: 18 G | Refills: 11 | Status: SHIPPED | OUTPATIENT
Start: 2022-02-24 | End: 2022-05-10 | Stop reason: SDUPTHER

## 2022-03-10 ENCOUNTER — PROCEDURE VISIT (OUTPATIENT)
Dept: OBSTETRICS AND GYNECOLOGY | Facility: CLINIC | Age: 32
End: 2022-03-10

## 2022-03-10 VITALS
HEIGHT: 62 IN | DIASTOLIC BLOOD PRESSURE: 82 MMHG | SYSTOLIC BLOOD PRESSURE: 134 MMHG | BODY MASS INDEX: 46.48 KG/M2 | WEIGHT: 252.6 LBS

## 2022-03-10 DIAGNOSIS — N93.9 ABNORMAL UTERINE BLEEDING: Primary | ICD-10-CM

## 2022-03-10 LAB
B-HCG UR QL: NEGATIVE
EXPIRATION DATE: NORMAL
INTERNAL NEGATIVE CONTROL: NEGATIVE
INTERNAL POSITIVE CONTROL: POSITIVE
Lab: NORMAL

## 2022-03-10 PROCEDURE — 58100 BIOPSY OF UTERUS LINING: CPT | Performed by: OBSTETRICS & GYNECOLOGY

## 2022-03-10 PROCEDURE — 99214 OFFICE O/P EST MOD 30 MIN: CPT | Performed by: OBSTETRICS & GYNECOLOGY

## 2022-03-10 PROCEDURE — 81025 URINE PREGNANCY TEST: CPT | Performed by: OBSTETRICS & GYNECOLOGY

## 2022-03-10 RX ORDER — SODIUM CHLORIDE 0.9 % (FLUSH) 0.9 %
3 SYRINGE (ML) INJECTION EVERY 12 HOURS SCHEDULED
Status: CANCELLED | OUTPATIENT
Start: 2022-06-01

## 2022-03-10 RX ORDER — SODIUM CHLORIDE 0.9 % (FLUSH) 0.9 %
10 SYRINGE (ML) INJECTION AS NEEDED
Status: CANCELLED | OUTPATIENT
Start: 2022-06-01

## 2022-03-10 RX ORDER — CLINDAMYCIN PHOSPHATE 900 MG/50ML
900 INJECTION INTRAVENOUS ONCE
Status: CANCELLED | OUTPATIENT
Start: 2022-06-01 | End: 2022-03-10

## 2022-03-10 RX ORDER — SODIUM CHLORIDE, SODIUM LACTATE, POTASSIUM CHLORIDE, CALCIUM CHLORIDE 600; 310; 30; 20 MG/100ML; MG/100ML; MG/100ML; MG/100ML
125 INJECTION, SOLUTION INTRAVENOUS CONTINUOUS
Status: CANCELLED | OUTPATIENT
Start: 2022-06-01

## 2022-03-10 NOTE — PROGRESS NOTES
[facility]  Gynecology  Procedure Note: Endometrial Biopsy    Date of procedure: 3/10/2022    LMP: No LMP recorded.    Procedure documentation:    The cervix was grasped anterior at the 12 o'clock position.  The cavity sounded to 9 centimeters.  An endometrial biopsy specimen was obtained. The tissue was sent for permanent histopathologic evaluation.  Tenaculum was removed from the cervix with scant bleeding. She tolerated the procedure well.    Post procedure instructions: If there is any significant fever or excessive bleeding or pain she is to call immediately.    Sia Sunshine MD  3/10/2022  09:02 CST

## 2022-03-10 NOTE — PROGRESS NOTES
[facility]  Gynecology  Date of Service: 03/10/2022    CC: EMB, AUB follow-up    HPI  Marizol Elkins is a 31 y.o.  premenopausal female who presents for EMB and follow-up of bleeding.    She was seen in 2021 as a referral from GIL Solano secondary to heavy menses in the setting of iron deficiency anemia.  She has a history of iron deficiency anemia and was referred to Dr. Alexander in 2021.  She received 2 infusions of Injectafer in late 2021.  Her starting Hgb prior to treatment was 11.1.  The lowest Hgb in our system was 10.8 on 3/8/2021.  She states that she has always had heavy and irregular periods.  She said that they are every 21-35 days, lasting 7 days.  She will go through 1 tampon/hour.  Reports fatigue.  She has been on Naproxen for several months which has not helped.  She has been on DepoProvera in the past but gained 70 lbs.  She has also tried NSAIDs with no improvement.  At that visit, we attempted EMB but aborted due to cervical stenosis and patient intolerance.  She declined any other medical therapy.     TVUS (2022):  Uterus measures 11.5 x 4.3 x 6.6 cm.  Uterine cavity empty.  No significant endometrial thickening (maximal endometrial thickness 5.6 mm).  3.5 cm x 4.2 cm exophytic fundal fibroid.  Status post right oophorectomy.  Left ovary grossly normal with small follicles.  Normal Doppler flow to the left ovary.  No free fluid in the pelvis.    Most recent labs:   21 15:11   Iron 30 (L)   Ferritin 10.89 (L)   Iron Saturation 6 (L)   Transferrin 332   TIBC 495   Folate 8.50      21 15:11   WBC 9.06   RBC 4.81   Hemoglobin 10.8 (L)   Hematocrit 34.9   RDW 15.9 (H)   MCV 72.6 (L)   MCH 22.5 (L)   MCHC 30.9 (L)   MPV 9.6   Platelets 350      21 08:45   TSH Baseline 2.280     She presents today for follow-up and EMB after rescheduling.  She states that she has continued to have heavy bleeding without any change.      She  is a CNA at [employer].  She is also in school.    ROS  Review of Systems   Constitutional: Negative.    HENT: Negative.    Eyes: Negative.    Respiratory: Negative.    Cardiovascular: Negative.    Gastrointestinal: Negative.    Endocrine: Negative.    Genitourinary: Positive for menstrual problem and vaginal bleeding.   Musculoskeletal: Positive for back pain.   Skin: Negative.    Allergic/Immunologic: Negative.    Neurological: Negative.    Hematological: Negative.    Psychiatric/Behavioral: Negative.      GYN HISTORY  Menarche: age 8  Menses: Irregular, every 21-35 days, lasts 7 days, ++ heavy, no intermenstrual bleeding  History of STIs: Gonorrhea,   Last pap smear: 2021  Abnormal pap smear history: None  Contraception: None  S/p RSO at time of  for large ovarian cyst     OB HISTORY  OB History    Para Term  AB Living   3 1 1   2 1   SAB IAB Ectopic Molar Multiple Live Births   2         1      # Outcome Date GA Lbr Brennen/2nd Weight Sex Delivery Anes PTL Lv   3 Term 2010     CS-Unspec   LUPILLO   2 SAB      SAB      1 SAB      SAB        PAST MEDICAL HISTORY  Past Medical History:   Diagnosis Date   • Anxiety    • Arthritis    • Asthma    • Bipolar disease, chronic (HCC)    • Endometriosis    • Gonorrhea    • Kidney stone    • Mild depression (HCC)    • Polycystic ovary syndrome    • PONV (postoperative nausea and vomiting)    • Prediabetes      PAST SURGICAL HISTORY  Past Surgical History:   Procedure Laterality Date   •  SECTION  2010   • SALPINGO OOPHORECTOMY Right 2010    Large ovarian cyst remotved at time of  removed   • TONSILLECTOMY AND ADENOIDECTOMY     • WISDOM TOOTH EXTRACTION       FAMILY HISTORY  Family History   Problem Relation Age of Onset   • Ovarian cancer Mother    • Diabetes Father    • Heart disease Father    • Hypertension Maternal Grandmother    • Breast cancer Paternal Grandmother    • Colon cancer Neg Hx    • Endometrial cancer Neg Hx     • Ovarian cancer Neg Hx      SOCIAL HISTORY  Social History     Socioeconomic History   • Marital status:    Tobacco Use   • Smoking status: Current Every Day Smoker     Packs/day: 1.00     Years: 10.00     Pack years: 10.00   • Smokeless tobacco: Never Used   Vaping Use   • Vaping Use: Never used   Substance and Sexual Activity   • Alcohol use: No   • Drug use: No   • Sexual activity: Yes     Partners: Male     Birth control/protection: None     ALLERGIES  Allergies   Allergen Reactions   • Penicillins Anaphylaxis   • Tramadol Anaphylaxis and Rash   • Fluoxetine Nausea Only and Other (See Comments)     Headaches   • Latex Swelling and Hives   • Bupropion Other (See Comments)     Makes her feel bad   • Celexa [Citalopram Hydrobromide] Anxiety   • Codeine Rash   • Cymbalta [Duloxetine Hcl] Anxiety   • Effexor [Venlafaxine] Anxiety   • Geodon [Ziprasidone Hcl] Anxiety   • Lamictal [Lamotrigine] Anxiety   • Latuda [Lurasidone Hcl] Anxiety   • Paxil [Paroxetine Hcl] Anxiety   • Viibryd [Vilazodone Hcl] Anxiety   • Zoloft [Sertraline Hcl] Anxiety     HOME MEDICATIONS  Prior to Admission medications    Medication Sig Start Date End Date Taking? Authorizing Provider   famotidine (PEPCID) 40 MG tablet Take 1 tablet by mouth Daily. 1/24/22   Gordon, GIL Barr   HYDROcodone-acetaminophen (NORCO) 7.5-325 MG per tablet Take 1 tablet by mouth 2 (Two) Times a Day As Needed for Moderate Pain . For chronic low back pain M.54.41, M54.42 2/24/22   Angelina Rice MD   metFORMIN (Glucophage) 1000 MG tablet Take 1 tablet by mouth 2 (Two) Times a Day With Meals. 2/24/22   Angelina Rice MD   miSOPROStol (Cytotec) 200 MCG tablet Place 1 tablet in the vagina 8 hours prior to biopsy; place 1 tablet in the vagina 4 hours prior to biopsy. 1/11/22   Sia Sunshine MD   naproxen (Naprosyn) 500 MG tablet Take 1 tablet by mouth 2 (Two) Times a Day As Needed for Mild Pain  (back pain, menstrual pain). 2/24/22    "Angelina Rice MD   ondansetron (ZOFRAN) 4 MG tablet Take 1 tablet by mouth Every 8 (Eight) Hours As Needed for Nausea or Vomiting. 2/24/22   Angelina Rice MD   pantoprazole (PROTONIX) 40 MG EC tablet Take 1 tablet by mouth Daily. For acid reflux 1/24/22   Gordon, Juhi Navarro GIL   phentermine (ADIPEX-P) 37.5 MG tablet Take 1 tablet by mouth Every Morning Before Breakfast. 1/28/22   Gordon, Juhi Navarro APRJULIA   Ventolin  (90 Base) MCG/ACT inhaler Inhale 2 puffs Every 6 (Six) Hours As Needed for Wheezing or Shortness of Air. Ventolin name brand only. 2/24/22   Angelina Rice MD     PE  /82   Ht 157.5 cm (62\")   Wt 115 kg (252 lb 9.6 oz)   BMI 46.20 kg/m²        General: Alert, healthy, no distress, well nourished and well developed.  Neurologic: Alert, oriented to person, place, and time.  Gait normal.  Cranial nerves II-XII grossly intact.  HEENT: Normocephalic, atraumatic.  Extraocular muscles intact, pupils equal and reactive times two.    Neck: Supple, no adenopathy, thyroid normal size, non-tender, without nodularity, trachea midline.  Lungs: Normal respiratory effort.  Clear to auscultation bilaterally.  No wheezes, rhonci, or rales.  Heart: Regular rate and rhythm.  No murmer, rub or gallop.  Abdomen: Soft, non-tender, non-distended,no masses, no hepatosplenomegaly, no hernia.  Skin: No rash, no lesions.  Extremities: No cyanosis, clubbing or edema.  PELVIC EXAM:  External Genitalia/Vulva: Anatomy is normal, no significant redness of labia, no discharge on vulvar tissues, One [place]'s and Bartholin's glands are normal, no ulcers, no condylomatous lesions.  Urethral meatus: Normal, no lesions, no prolapse.  Urethra: Normal, no masses, no tenderness with palpation.  Bladder: Normal, no fullness, no masses, no tenderness with palpation.  Vagina: Vaginal tissues are not inflamed, normal color and texture, no significant discharge present.  Pelvic support adequate.  Cervix: Normal, no lesions, " no purulent discharge, no cervical motion tenderness.  Uterus: Normal size, shape, and consistency.  Good mobility noted.  Minimal descent noted with good support.  Adnexa: Normal size and shape bilaterally, no palpable mass bilaterally and non-tender bilaterally.  Rectal: Normal, no masses or polyps, confirms bimanual exam, perianal normal, no lesions; FELIPE deferred.    See procedure note for EMB    IMPRESSION  Marizol Elkins is a 31 y.o.  presenting with AUB with iron deficiency anemia requiring IV iron infusions in .  Discussed options for management of AUB including expectant management, medical management (OCPs, POPs, DepoProvera, Nexplanon, Mirena IUD), and surgical management (endometrial ablation, hysterectomy).  Reviewed pros/cons of each.  She has failed NSAIDs and had significant weight gain with DepoProvera and declined any other medical therapy.  Given her exophytic fibroid, I do not feel that she would benefit from an endometrial ablation.  Discussed option of hysterectomy since she has completed childbearing.  She desires definitive management with hysterectomy.  She desires to wait until the summer to have it done; she will call us back in the next few weeks to schedule.    PLAN    1. Abnormal uterine bleeding  - Tissue Pathology Exam; Future  - Tissue Pathology Exam    This document has been electronically signed by Sia Sunshine MD on March 10, 2022 09:42 CST.

## 2022-03-14 ENCOUNTER — TELEPHONE (OUTPATIENT)
Dept: OBSTETRICS AND GYNECOLOGY | Facility: CLINIC | Age: 32
End: 2022-03-14

## 2022-03-14 LAB
LAB AP CASE REPORT: NORMAL
PATH REPORT.FINAL DX SPEC: NORMAL

## 2022-03-14 NOTE — TELEPHONE ENCOUNTER
Called and spoke with patient regarding results of EMB.  Patient verbalized understanding and will contact the office to schedule srugery in a couple weeks

## 2022-03-14 NOTE — TELEPHONE ENCOUNTER
----- Message from Sia Sunshine MD sent at 3/14/2022  9:59 AM CDT -----  Endometrial biopsy: Mild secretory phase endometrium (day 23-24).  Negative for hyperplasia or malignancy.    Please call and let her know that the biopsy was negative for cancer or pre-cancer.  Remind her to call us in the next couple of weeks if she wants to plan for the summer or if she knows when she would like to have her hysterectomy, we can go ahead and schedule that.

## 2022-03-22 ENCOUNTER — LAB (OUTPATIENT)
Dept: LAB | Facility: OTHER | Age: 32
End: 2022-03-22

## 2022-03-22 DIAGNOSIS — D50.0 IRON DEFICIENCY ANEMIA DUE TO CHRONIC BLOOD LOSS: ICD-10-CM

## 2022-03-22 LAB
ALBUMIN SERPL-MCNC: 3.7 G/DL (ref 3.5–5)
ALBUMIN/GLOB SERPL: 1.2 G/DL (ref 1.1–1.8)
ALP SERPL-CCNC: 66 U/L (ref 38–126)
ALT SERPL W P-5'-P-CCNC: 12 U/L
ANION GAP SERPL CALCULATED.3IONS-SCNC: 7 MMOL/L (ref 5–15)
AST SERPL-CCNC: 19 U/L (ref 14–36)
BASOPHILS # BLD AUTO: 0.05 10*3/MM3 (ref 0–0.2)
BASOPHILS NFR BLD AUTO: 0.5 % (ref 0–1.5)
BILIRUB SERPL-MCNC: 0.3 MG/DL (ref 0.2–1.3)
BUN SERPL-MCNC: 8 MG/DL (ref 7–23)
BUN/CREAT SERPL: 12.5 (ref 7–25)
CALCIUM SPEC-SCNC: 8.7 MG/DL (ref 8.4–10.2)
CHLORIDE SERPL-SCNC: 104 MMOL/L (ref 101–112)
CHOLEST SERPL-MCNC: 169 MG/DL (ref 150–200)
CO2 SERPL-SCNC: 26 MMOL/L (ref 22–30)
CREAT SERPL-MCNC: 0.64 MG/DL (ref 0.52–1.04)
DEPRECATED RDW RBC AUTO: 44.5 FL (ref 37–54)
EGFRCR SERPLBLD CKD-EPI 2021: 119.8 ML/MIN/1.73
EOSINOPHIL # BLD AUTO: 0.28 10*3/MM3 (ref 0–0.4)
EOSINOPHIL NFR BLD AUTO: 2.6 % (ref 0.3–6.2)
ERYTHROCYTE [DISTWIDTH] IN BLOOD BY AUTOMATED COUNT: 15.2 % (ref 12.3–15.4)
GLOBULIN UR ELPH-MCNC: 3.1 GM/DL (ref 2.3–3.5)
GLUCOSE SERPL-MCNC: 118 MG/DL (ref 70–99)
HCT VFR BLD AUTO: 37.6 % (ref 34–46.6)
HDLC SERPL-MCNC: 41 MG/DL (ref 40–59)
HGB BLD-MCNC: 12.2 G/DL (ref 12–15.9)
LDLC SERPL CALC-MCNC: 95 MG/DL
LDLC/HDLC SERPL: 2.18 {RATIO} (ref 0–3.22)
LYMPHOCYTES # BLD AUTO: 2.24 10*3/MM3 (ref 0.7–3.1)
LYMPHOCYTES NFR BLD AUTO: 21.1 % (ref 19.6–45.3)
MCH RBC QN AUTO: 26.5 PG (ref 26.6–33)
MCHC RBC AUTO-ENTMCNC: 32.4 G/DL (ref 31.5–35.7)
MCV RBC AUTO: 81.6 FL (ref 79–97)
MONOCYTES # BLD AUTO: 0.61 10*3/MM3 (ref 0.1–0.9)
MONOCYTES NFR BLD AUTO: 5.7 % (ref 5–12)
NEUTROPHILS NFR BLD AUTO: 7.45 10*3/MM3 (ref 1.7–7)
NEUTROPHILS NFR BLD AUTO: 70.1 % (ref 42.7–76)
PLATELET # BLD AUTO: 298 10*3/MM3 (ref 140–450)
PMV BLD AUTO: 9.1 FL (ref 6–12)
POTASSIUM SERPL-SCNC: 3.2 MMOL/L (ref 3.4–5)
PROT SERPL-MCNC: 6.8 G/DL (ref 6.3–8.6)
RBC # BLD AUTO: 4.61 10*6/MM3 (ref 3.77–5.28)
SODIUM SERPL-SCNC: 137 MMOL/L (ref 137–145)
TRIGL SERPL-MCNC: 194 MG/DL
VLDLC SERPL-MCNC: 33 MG/DL (ref 5–40)
WBC NRBC COR # BLD: 10.63 10*3/MM3 (ref 3.4–10.8)

## 2022-03-22 PROCEDURE — 83527 ASSAY OF INSULIN: CPT | Performed by: NURSE PRACTITIONER

## 2022-03-22 PROCEDURE — 80061 LIPID PANEL: CPT | Performed by: NURSE PRACTITIONER

## 2022-03-22 PROCEDURE — 85025 COMPLETE CBC W/AUTO DIFF WBC: CPT | Performed by: NURSE PRACTITIONER

## 2022-03-22 PROCEDURE — 83525 ASSAY OF INSULIN: CPT | Performed by: NURSE PRACTITIONER

## 2022-03-22 PROCEDURE — 80053 COMPREHEN METABOLIC PANEL: CPT | Performed by: NURSE PRACTITIONER

## 2022-03-22 PROCEDURE — G0481 DRUG TEST DEF 8-14 CLASSES: HCPCS | Performed by: NURSE PRACTITIONER

## 2022-03-22 PROCEDURE — 83540 ASSAY OF IRON: CPT | Performed by: NURSE PRACTITIONER

## 2022-03-22 PROCEDURE — 84466 ASSAY OF TRANSFERRIN: CPT | Performed by: NURSE PRACTITIONER

## 2022-03-22 PROCEDURE — 80307 DRUG TEST PRSMV CHEM ANLYZR: CPT | Performed by: NURSE PRACTITIONER

## 2022-03-22 PROCEDURE — 83036 HEMOGLOBIN GLYCOSYLATED A1C: CPT | Performed by: NURSE PRACTITIONER

## 2022-03-23 DIAGNOSIS — E87.6 HYPOKALEMIA: Primary | ICD-10-CM

## 2022-03-23 LAB — HBA1C MFR BLD: 5.5 % (ref 4.8–5.6)

## 2022-03-23 RX ORDER — POTASSIUM CHLORIDE 20 MEQ/1
20 TABLET, EXTENDED RELEASE ORAL DAILY
Qty: 3 TABLET | Refills: 0 | Status: SHIPPED | OUTPATIENT
Start: 2022-03-23 | End: 2022-03-26

## 2022-03-24 ENCOUNTER — OFFICE VISIT (OUTPATIENT)
Dept: FAMILY MEDICINE CLINIC | Facility: CLINIC | Age: 32
End: 2022-03-24

## 2022-03-24 VITALS
RESPIRATION RATE: 16 BRPM | OXYGEN SATURATION: 98 % | DIASTOLIC BLOOD PRESSURE: 79 MMHG | WEIGHT: 248.6 LBS | TEMPERATURE: 97.9 F | HEART RATE: 76 BPM | SYSTOLIC BLOOD PRESSURE: 130 MMHG | HEIGHT: 62 IN | BODY MASS INDEX: 45.75 KG/M2

## 2022-03-24 DIAGNOSIS — D50.0 IRON DEFICIENCY ANEMIA DUE TO CHRONIC BLOOD LOSS: Primary | ICD-10-CM

## 2022-03-24 DIAGNOSIS — E66.01 MORBID (SEVERE) OBESITY DUE TO EXCESS CALORIES: ICD-10-CM

## 2022-03-24 LAB
IRON 24H UR-MRATE: 37 MCG/DL (ref 37–145)
IRON SATN MFR SERPL: 8 % (ref 20–50)
TIBC SERPL-MCNC: 472 MCG/DL (ref 298–536)
TRANSFERRIN SERPL-MCNC: 317 MG/DL (ref 200–360)

## 2022-03-24 PROCEDURE — 99214 OFFICE O/P EST MOD 30 MIN: CPT | Performed by: NURSE PRACTITIONER

## 2022-03-24 RX ORDER — PHENTERMINE HYDROCHLORIDE 37.5 MG/1
37.5 TABLET ORAL
Qty: 30 TABLET | Refills: 0 | Status: SHIPPED | OUTPATIENT
Start: 2022-03-24 | End: 2022-04-21 | Stop reason: SDUPTHER

## 2022-03-24 NOTE — PATIENT INSTRUCTIONS
Increase intake of water to 6-8 glasses per day.  Cut out concentrated sugars and reduce simple carbs.  Count calories, measure servings  Use a smart phone melissa to help with counting calories and tracking weight loss, healthy lifestyle modifications.     Demonstrate at least a 1-2 pound / month weight loss with a healthy BP to continue on phentermine    Be aware that my routine practice is to allow qualified persons take phentermine for 3 months consecutively, then a one month holiday off it to prove the presence of lifestyle changes which are sustained, and may repeat this cycle as long as qualified and appropriate for phentermine.    If BMI falls below 27, the cycle will not continue

## 2022-03-24 NOTE — PROGRESS NOTES
Subjective   Marizol Elkins is a 31 y.o. female.     has Iron deficiency anemia; Iron malabsorption; Morbidly obese (HCC); and Pre-operative cardiovascular examination on their problem list.     Chief Complaint   Patient presents with   • Weight Check        History of Present Illness   Body mass index is 45.47 kg/m².  Obesity, not improving with her own diet and exercise attempts. She is scheduled for hysterectomy in  and wants to lose some weight before her postop imposed decreased activity. Has used phentermine in the past with success and has kept this weight off since last used the medicine. Would like to resume.  Advised of following:  Increase intake of water to 6-8 glasses per day.  Cut out concentrated sugars and reduce simple carbs.  Count calories, measure servings  Use a smart phone melissa to help with counting calories and tracking weight loss, healthy lifestyle modifications.     Demonstrate at least a 1-2 pound / month weight loss with a healthy BP to continue on phentermine    Be aware that my routine practice is to allow qualified persons take phentermine for 3 months consecutively, then a one month holiday off it to prove the presence of lifestyle changes which are sustained, and may repeat this cycle as long as qualified and appropriate for phentermine.    If BMI falls below 27, the cycle will not continue  She wishes to continue.    No other complaint today.      Past Surgical History:   Procedure Laterality Date   •  SECTION     • SALPINGO OOPHORECTOMY Right     Large ovarian cyst remotved at time of  removed   • TONSILLECTOMY AND ADENOIDECTOMY     • WISDOM TOOTH EXTRACTION        Social History     Socioeconomic History   • Marital status:    Tobacco Use   • Smoking status: Current Every Day Smoker     Packs/day: 1.00     Years: 10.00     Pack years: 10.00   • Smokeless tobacco: Never Used   Vaping Use   • Vaping Use: Never used   Substance and Sexual Activity    • Alcohol use: No   • Drug use: No   • Sexual activity: Yes     Partners: Male     Birth control/protection: None      The following portions of the patient's history were reviewed and updated as appropriate: allergies, current medications, past family history, past medical history, past social history, past surgical history and problem list.    Review of Systems   Constitutional: Negative.    HENT: Negative.  Negative for congestion.    Eyes: Negative.  Negative for blurred vision, double vision, photophobia and visual disturbance.   Respiratory: Negative.  Negative for cough, shortness of breath, wheezing and stridor.    Cardiovascular: Negative.  Negative for chest pain, palpitations and leg swelling.   Gastrointestinal: Negative.  Negative for abdominal distention, abdominal pain, blood in stool, constipation, diarrhea, nausea, vomiting, GERD and indigestion.   Endocrine: Negative.  Negative for cold intolerance and heat intolerance.   Genitourinary: Negative.  Negative for dysuria, flank pain, frequency and urinary incontinence.   Musculoskeletal: Negative.  Negative for arthralgias and back pain.   Skin: Negative.    Allergic/Immunologic: Negative.  Negative for immunocompromised state.   Neurological: Negative.    Hematological: Negative.    Psychiatric/Behavioral: Negative.  Negative for agitation, behavioral problems, decreased concentration, dysphoric mood, hallucinations, self-injury, sleep disturbance, suicidal ideas, negative for hyperactivity, depressed mood and stress. The patient is not nervous/anxious.    All other systems reviewed and are negative.    PHQ-9 Depression Screening  Little interest or pleasure in doing things?     Feeling down, depressed, or hopeless?     Trouble falling or staying asleep, or sleeping too much?     Feeling tired or having little energy?     Poor appetite or overeating?     Feeling bad about yourself - or that you are a failure or have let yourself or your family  down?     Trouble concentrating on things, such as reading the newspaper or watching television?     Moving or speaking so slowly that other people could have noticed? Or the opposite - being so fidgety or restless that you have been moving around a lot more than usual?     Thoughts that you would be better off dead, or of hurting yourself in some way?     PHQ-9 Total Score     If you checked off any problems, how difficult have these problems made it for you to do your work, take care of things at home, or get along with other people?      Patient understands the risks associated with this controlled medication, including tolerance and addiction.  Patient also agrees to only obtain this medication from me, and not from a another provider, unless that provider is covering for me in my absence.  Patient also agrees to be compliant in dosing, and not self adjust the dose of medication.  A signed controlled substance agreement is on file, and the patient has received a controlled substance education sheet at this a previous visit.  The patient has also signed a consent for treatment with a controlled substance as per [facility] policy. FILEMON was obtained.    Objective   Physical Exam  Vitals and nursing note reviewed.   Constitutional:       General: She is not in acute distress.     Appearance: Normal appearance. She is well-developed. She is obese. She is not ill-appearing or diaphoretic.   HENT:      Head: Normocephalic and atraumatic.   Eyes:      General: No scleral icterus.        Right eye: No discharge.         Left eye: No discharge.      Conjunctiva/sclera: Conjunctivae normal.      Pupils: Pupils are equal, round, and reactive to light.   Neck:      Thyroid: No thyromegaly.      Vascular: No carotid bruit or JVD.      Trachea: No tracheal deviation.   Cardiovascular:      Rate and Rhythm: Normal rate and regular rhythm.      Pulses: Normal pulses.      Heart sounds: Normal heart sounds. No murmur  "heard.    No friction rub. No gallop.   Pulmonary:      Effort: Pulmonary effort is normal. No respiratory distress.      Breath sounds: Normal breath sounds. No stridor. No wheezing, rhonchi or rales.   Chest:      Chest wall: No tenderness.   Abdominal:      General: Bowel sounds are normal.      Palpations: Abdomen is soft.   Musculoskeletal:         General: No tenderness or deformity. Normal range of motion.      Cervical back: Normal range of motion and neck supple. No rigidity or tenderness.      Right lower leg: No edema.      Left lower leg: No edema.   Lymphadenopathy:      Cervical: No cervical adenopathy.   Skin:     General: Skin is warm and dry.      Capillary Refill: Capillary refill takes 2 to 3 seconds.      Coloration: Skin is not jaundiced or pale.      Findings: No bruising, erythema, lesion or rash.   Neurological:      General: No focal deficit present.      Mental Status: She is alert and oriented to person, place, and time. Mental status is at baseline.      Motor: No weakness.      Gait: Gait normal.   Psychiatric:         Mood and Affect: Mood normal.         Behavior: Behavior normal.         Thought Content: Thought content normal.         Judgment: Judgment normal.       Vitals:    03/24/22 1041   BP: 130/79   BP Location: Left arm   Patient Position: Sitting   Cuff Size: Adult   Pulse: 76   Resp: 16   Temp: 97.9 °F (36.6 °C)   SpO2: 98%   Weight: 113 kg (248 lb 9.6 oz)   Height: 157.5 cm (62\")   PainSc: 0-No pain      Body mass index is 45.47 kg/m².      Assessment/Plan   Diagnoses and all orders for this visit:    1. Iron deficiency anemia due to chronic blood loss (Primary)  -     Iron Profile; Future    2. Body mass index (BMI) of 45.0 to 49.9 in adult (HCC)  -     phentermine (ADIPEX-P) 37.5 MG tablet; Take 1 tablet by mouth Every Morning Before Breakfast.  Dispense: 30 tablet; Refill: 0    3. Morbid (severe) obesity due to excess calories (HCC)  -     phentermine (ADIPEX-P) 37.5 MG " tablet; Take 1 tablet by mouth Every Morning Before Breakfast.  Dispense: 30 tablet; Refill: 0               Return in about 4 weeks (around 4/21/2022).  Patient Instructions   Increase intake of water to 6-8 glasses per day.  Cut out concentrated sugars and reduce simple carbs.  Count calories, measure servings  Use a smart phone melissa to help with counting calories and tracking weight loss, healthy lifestyle modifications.     Demonstrate at least a 1-2 pound / month weight loss with a healthy BP to continue on phentermine    Be aware that my routine practice is to allow qualified persons take phentermine for 3 months consecutively, then a one month holiday off it to prove the presence of lifestyle changes which are sustained, and may repeat this cycle as long as qualified and appropriate for phentermine.    If BMI falls below 27, the cycle will not continue            This document has been electronically signed by GIL Darling on March 24, 2022 18:10 CDT

## 2022-03-27 LAB
INSULIN FREE SERPL-ACNC: 134 UU/ML
INSULIN SERPL-ACNC: 134 UU/ML

## 2022-03-28 LAB
6MAM UR QL CFM: NEGATIVE
6MAM/CREAT UR: NOT DETECTED NG/MG CREAT
7AMINOCLONAZEPAM/CREAT UR: NOT DETECTED NG/MG CREAT
A-OH ALPRAZ/CREAT UR: NOT DETECTED NG/MG CREAT
A-OH-TRIAZOLAM/CREAT UR CFM: NOT DETECTED NG/MG CREAT
ALFENTANIL/CREAT UR CFM: NOT DETECTED NG/MG CREAT
ALPHA-HYDROXYMIDAZOLAM, URINE: NOT DETECTED NG/MG CREAT
ALPRAZ/CREAT UR CFM: NOT DETECTED NG/MG CREAT
AMOBARBITAL UR QL CFM: NOT DETECTED
AMPHET/CREAT UR: NOT DETECTED NG/MG CREAT
AMPHETAMINES UR QL CFM: NEGATIVE
BARBITAL UR QL CFM: NOT DETECTED
BARBITURATES UR QL CFM: NEGATIVE
BENZODIAZ UR QL CFM: NEGATIVE
BUPRENORPHINE UR QL CFM: NEGATIVE
BUPRENORPHINE/CREAT UR: NOT DETECTED NG/MG CREAT
BUTABARBITAL UR QL CFM: NOT DETECTED
BUTALBITAL UR QL CFM: NOT DETECTED
BZE/CREAT UR: NOT DETECTED NG/MG CREAT
CANNABINOIDS UR QL CFM: NEGATIVE
CARBOXYTHC/CREAT UR: NOT DETECTED NG/MG CREAT
CLONAZEPAM/CREAT UR CFM: NOT DETECTED NG/MG CREAT
COCAETHYLENE/CREAT UR CFM: NOT DETECTED NG/MG CREAT
COCAINE UR QL CFM: NEGATIVE
COCAINE/CREAT UR CFM: NOT DETECTED NG/MG CREAT
CODEINE/CREAT UR: NOT DETECTED NG/MG CREAT
CREAT UR-MCNC: 178 MG/DL
DESALKYLFLURAZ/CREAT UR: NOT DETECTED NG/MG CREAT
DESMETHYLFLUNITRAZEPAM: NOT DETECTED NG/MG CREAT
DHC/CREAT UR: 195 NG/MG CREAT
DIAZEPAM/CREAT UR: NOT DETECTED NG/MG CREAT
DRUGS UR: NORMAL
EDDP/CREAT UR: NOT DETECTED NG/MG CREAT
ETHANOL UR CFM-MCNC: NOT DETECTED G/DL
ETHANOL UR QL CFM: NEGATIVE
FENTANYL UR QL CFM: NEGATIVE
FENTANYL/CREAT UR: NOT DETECTED NG/MG CREAT
FLUNITRAZEPAM UR QL CFM: NOT DETECTED NG/MG CREAT
HYDROCODONE/CREAT UR: 723 NG/MG CREAT
HYDROMORPHONE/CREAT UR: 39 NG/MG CREAT
LEVEL OF DETECTION:: NORMAL
LORAZEPAM/CREAT UR: NOT DETECTED NG/MG CREAT
MDA/CREAT UR: NOT DETECTED NG/MG CREAT
MDMA/CREAT UR: NOT DETECTED NG/MG CREAT
MEPHOBARBITAL UR QL CFM: NOT DETECTED
METHADONE UR QL CFM: NEGATIVE
METHADONE/CREAT UR: NOT DETECTED NG/MG CREAT
METHAMPHET/CREAT UR: NOT DETECTED NG/MG CREAT
MIDAZOLAM/CREAT UR CFM: NOT DETECTED NG/MG CREAT
MORPHINE/CREAT UR: NOT DETECTED NG/MG CREAT
N-NORTRAMADOL/CREAT UR CFM: NOT DETECTED NG/MG CREAT
NARCOTICS UR: NEGATIVE
NORBUPRENORPHINE/CREAT UR: NOT DETECTED NG/MG CREAT
NORCODEINE/CREAT UR CFM: NOT DETECTED NG/MG CREAT
NORDIAZEPAM/CREAT UR: NOT DETECTED NG/MG CREAT
NORFENTANYL/CREAT UR: NOT DETECTED NG/MG CREAT
NORHYDROCODONE/CREAT UR: 465 NG/MG CREAT
NORMORPHINE UR-MCNC: NOT DETECTED NG/MG CREAT
NOROXYCODONE/CREAT UR: NOT DETECTED NG/MG CREAT
NOROXYMORPHONE/CREAT UR CFM: NOT DETECTED NG/MG CREAT
O-NORTRAMADOL UR CFM-MCNC: NOT DETECTED NG/MG CREAT
OPIATES UR QL CFM: NORMAL
OXAZEPAM/CREAT UR: NOT DETECTED NG/MG CREAT
OXYCODONE UR QL CFM: NEGATIVE
OXYCODONE/CREAT UR: NOT DETECTED NG/MG CREAT
OXYMORPHONE/CREAT UR: NOT DETECTED NG/MG CREAT
PENTOBARB UR QL CFM: NOT DETECTED
PHENOBARB UR QL CFM: NOT DETECTED
SECOBARBITAL UR QL CFM: NOT DETECTED
SUFENTANIL/CREAT UR CFM: NOT DETECTED NG/MG CREAT
TAPENTADOL UR QL CFM: NEGATIVE
TAPENTADOL/CREAT UR: NOT DETECTED NG/MG CREAT
TEMAZEPAM/CREAT UR: NOT DETECTED NG/MG CREAT
THIOPENTAL UR QL CFM: NOT DETECTED
TRAMADOL UR QL CFM: NOT DETECTED NG/MG CREAT

## 2022-04-06 ENCOUNTER — OFFICE VISIT (OUTPATIENT)
Dept: ONCOLOGY | Facility: CLINIC | Age: 32
End: 2022-04-06

## 2022-04-06 VITALS
SYSTOLIC BLOOD PRESSURE: 131 MMHG | TEMPERATURE: 96.5 F | RESPIRATION RATE: 18 BRPM | DIASTOLIC BLOOD PRESSURE: 70 MMHG | HEART RATE: 89 BPM | BODY MASS INDEX: 45.18 KG/M2 | OXYGEN SATURATION: 99 % | WEIGHT: 247 LBS

## 2022-04-06 DIAGNOSIS — D50.9 IRON DEFICIENCY ANEMIA, UNSPECIFIED IRON DEFICIENCY ANEMIA TYPE: Primary | ICD-10-CM

## 2022-04-06 DIAGNOSIS — K90.9 IRON MALABSORPTION: ICD-10-CM

## 2022-04-06 PROCEDURE — G0463 HOSPITAL OUTPT CLINIC VISIT: HCPCS | Performed by: INTERNAL MEDICINE

## 2022-04-06 PROCEDURE — 99214 OFFICE O/P EST MOD 30 MIN: CPT | Performed by: INTERNAL MEDICINE

## 2022-04-06 RX ORDER — SODIUM CHLORIDE 9 MG/ML
250 INJECTION, SOLUTION INTRAVENOUS ONCE
Status: CANCELLED | OUTPATIENT
Start: 2022-04-13

## 2022-04-06 NOTE — PROGRESS NOTES
Chief Complaint  Follow-up - iron deficiency anemia     Subjective          Marizol Elkins presents to [facility]  History of Present Illness     Marizol Elkins was seen in follow-up for iron deficiency anemia.  Since her last visit she has been reporting worsening fatigue and tiredness.    Her iron level was checked by her primary care provider which showed low iron saturation of 8.  She is unable to tolerate oral iron due to GI side effect.  She continues struggle with heavy menses for which she has been following with Dr. Sunshine.    Objective   Vital Signs:   /70   Pulse 89   Temp 96.5 °F (35.8 °C)   Resp 18   Wt 112 kg (247 lb)   SpO2 99%   BMI 45.18 kg/m²            Physical Exam  Vitals and nursing note reviewed.   Constitutional:       Appearance: Normal appearance.   Neurological:      General: No focal deficit present.      Mental Status: She is alert and oriented to person, place, and time. Mental status is at baseline.   Psychiatric:         Mood and Affect: Mood normal.         Behavior: Behavior normal.         Thought Content: Thought content normal.        Result Review :   The following data was reviewed by: Bell Maguire MD on 04/06/2022:  Common labs    Common Labsle 5/3/21 7/19/21 7/19/21 7/19/21 7/19/21 3/22/22 3/22/22 3/22/22 3/22/22     1400 1400 1400 1400 1105 1105 1105 1105   Glucose   106 (A)    118 (A)     BUN   8    8     Creatinine   0.80    0.64     eGFR Non African Am   83         Sodium   136    137     Potassium   3.6    3.2 (A)     Chloride   101    104     Calcium   8.5 (A)    8.7     Albumin   3.80    3.70     Total Bilirubin   0.2    0.3     Alkaline Phosphatase   60    66     AST (SGOT)   10    19     ALT (SGPT)   7    12     WBC 9.87 9.93    10.63      Hemoglobin 13.6 14.1    12.2      Hematocrit 40.7 40.5    37.6      Platelets 283 260    298      Total Cholesterol    197    169    Triglycerides    244  (A)    194 (A)    HDL Cholesterol    40    41    LDL Cholesterol     114 (A)    95    Hemoglobin A1C     5.04    5.50   (A) Abnormal value            CMP    CMP 7/19/21 3/22/22   Glucose 106 (A) 118 (A)   BUN 8 8   Creatinine 0.80 0.64   eGFR Non African Am 83    Sodium 136 137   Potassium 3.6 3.2 (A)   Chloride 101 104   Calcium 8.5 (A) 8.7   Albumin 3.80 3.70   Total Bilirubin 0.2 0.3   Alkaline Phosphatase 60 66   AST (SGOT) 10 19   ALT (SGPT) 7 12   (A) Abnormal value            CBC    CBC 5/3/21 7/19/21 3/22/22   WBC 9.87 9.93 10.63   RBC 4.98 4.53 4.61   Hemoglobin 13.6 14.1 12.2   Hematocrit 40.7 40.5 37.6   MCV 81.7 89.4 81.6   MCH 27.3 31.1 26.5 (A)   MCHC 33.4 34.8 32.4   RDW 21.8 (A) 13.8 15.2   Platelets 283 260 298   (A) Abnormal value            CBC w/diff    CBC w/Diff 5/3/21 7/19/21 3/22/22   WBC 9.87 9.93 10.63   RBC 4.98 4.53 4.61   Hemoglobin 13.6 14.1 12.2   Hematocrit 40.7 40.5 37.6   MCV 81.7 89.4 81.6   MCH 27.3 31.1 26.5 (A)   MCHC 33.4 34.8 32.4   RDW 21.8 (A) 13.8 15.2   Platelets 283 260 298   Neutrophil Rel % 64.6 65.2 70.1   Immature Granulocyte Rel % 0.4     Lymphocyte Rel % 25.9 24.5 21.1   Monocyte Rel % 5.9 6.1 5.7   Eosinophil Rel % 2.7 3.5 2.6   Basophil Rel % 0.5 0.7 0.5   (A) Abnormal value              Marizol Elkins reports a pain score of 6.  Given her pain assessment as noted, treatment options were discussed and the following options were decided upon as a follow-up plan to address the patient's pain: referral to Primary Care for assistance in pain treatment guidance.    Patient screened positive for depression based on a PHQ-9 score of 0 on 4/6/2022. Follow-up recommendations include: Suicide Risk Assessment performed.      Assessment and Plan    Diagnoses and all orders for this visit:    1. Iron deficiency anemia, unspecified iron deficiency anemia type (Primary)  -     CBC (No Diff); Future  -     Ferritin; Future  -     Iron Profile; Future    2. Iron malabsorption  -      CBC (No Diff); Future  -     Ferritin; Future  -     Iron Profile; Future    Worsening.  She had recent laboratory testing performed by her primary care provider which show iron saturation of 8.  She is feeling fatigue and tiredness likely from iron deficiency anemia.  Patient with iron deficiency anemia likely secondary to heavy menses.  She is unable to tolerate oral iron due to GI side effect.  Due to this reason I believe she would benefit from iron treatment.    I had an extensive discussion with patient about diagnosis and treatment options. I recommend that we replace their iron with IV Venofer -200 mg x 3 infusion.    I had an extensive discussion with the patient about risk versus benefits of IV iron treatment.    I discussed about various risks associated with IV iron such as allergic reaction, hypersensitivity reaction, headache, flushing, joint aches or pains, local IV infiltration and skin discoloration.  After our discussion the patient was in agreement in  proceeding with IV iron treatment for  Anemia.    I will recheck her CBC, ferritin, iron profile in 3 months to evaluate response to treatment.  She is also following with gynecology and is being considered for hysterectomy.          Follow Up   No follow-ups on file.  Patient was given instructions and counseling regarding her condition or for health maintenance advice. Please see specific information pulled into the AVS if appropriate.

## 2022-04-12 DIAGNOSIS — M54.2 CHRONIC MIDLINE POSTERIOR NECK PAIN: ICD-10-CM

## 2022-04-12 DIAGNOSIS — G89.29 CHRONIC BILATERAL LOW BACK PAIN WITH BILATERAL SCIATICA: ICD-10-CM

## 2022-04-12 DIAGNOSIS — K21.9 GASTROESOPHAGEAL REFLUX DISEASE: ICD-10-CM

## 2022-04-12 DIAGNOSIS — M54.42 CHRONIC BILATERAL LOW BACK PAIN WITH BILATERAL SCIATICA: ICD-10-CM

## 2022-04-12 DIAGNOSIS — M54.41 CHRONIC BILATERAL LOW BACK PAIN WITH BILATERAL SCIATICA: ICD-10-CM

## 2022-04-12 DIAGNOSIS — G89.29 CHRONIC MIDLINE POSTERIOR NECK PAIN: ICD-10-CM

## 2022-04-12 RX ORDER — HYDROCODONE BITARTRATE AND ACETAMINOPHEN 7.5; 325 MG/1; MG/1
1 TABLET ORAL 2 TIMES DAILY PRN
Qty: 45 TABLET | Refills: 0 | Status: SHIPPED | OUTPATIENT
Start: 2022-04-12 | End: 2022-05-10 | Stop reason: SDUPTHER

## 2022-04-13 ENCOUNTER — TELEPHONE (OUTPATIENT)
Dept: OBSTETRICS AND GYNECOLOGY | Facility: CLINIC | Age: 32
End: 2022-04-13

## 2022-04-13 NOTE — TELEPHONE ENCOUNTER
----- Message from Dai Gabriel LPN sent at 4/7/2022  3:48 PM CDT -----  Wants to set up surgery around the end of May if possible.   [phone]

## 2022-04-14 ENCOUNTER — INFUSION (OUTPATIENT)
Dept: ONCOLOGY | Facility: HOSPITAL | Age: 32
End: 2022-04-14

## 2022-04-14 VITALS
DIASTOLIC BLOOD PRESSURE: 76 MMHG | HEART RATE: 96 BPM | RESPIRATION RATE: 18 BRPM | TEMPERATURE: 96.8 F | SYSTOLIC BLOOD PRESSURE: 115 MMHG

## 2022-04-14 DIAGNOSIS — D50.9 IRON DEFICIENCY ANEMIA, UNSPECIFIED IRON DEFICIENCY ANEMIA TYPE: Primary | ICD-10-CM

## 2022-04-14 DIAGNOSIS — K90.9 IRON MALABSORPTION: ICD-10-CM

## 2022-04-14 PROCEDURE — 25010000002 IRON SUCROSE PER 1 MG: Performed by: INTERNAL MEDICINE

## 2022-04-14 PROCEDURE — 96365 THER/PROPH/DIAG IV INF INIT: CPT | Performed by: INTERNAL MEDICINE

## 2022-04-14 RX ORDER — FAMOTIDINE 40 MG/1
40 TABLET, FILM COATED ORAL DAILY
Qty: 90 TABLET | Refills: 1 | Status: SHIPPED | OUTPATIENT
Start: 2022-04-14 | End: 2022-08-23 | Stop reason: SDUPTHER

## 2022-04-14 RX ORDER — PANTOPRAZOLE SODIUM 40 MG/1
40 TABLET, DELAYED RELEASE ORAL DAILY
Qty: 90 TABLET | Refills: 1 | Status: SHIPPED | OUTPATIENT
Start: 2022-04-14 | End: 2022-08-23 | Stop reason: SDUPTHER

## 2022-04-14 RX ORDER — SODIUM CHLORIDE 9 MG/ML
250 INJECTION, SOLUTION INTRAVENOUS ONCE
Status: COMPLETED | OUTPATIENT
Start: 2022-04-14 | End: 2022-04-14

## 2022-04-14 RX ORDER — SODIUM CHLORIDE 9 MG/ML
250 INJECTION, SOLUTION INTRAVENOUS ONCE
Status: CANCELLED | OUTPATIENT
Start: 2022-04-15

## 2022-04-14 RX ADMIN — SODIUM CHLORIDE 250 ML: 9 INJECTION, SOLUTION INTRAVENOUS at 10:29

## 2022-04-14 RX ADMIN — IRON SUCROSE 200 MG: 20 INJECTION, SOLUTION INTRAVENOUS at 10:29

## 2022-04-19 ENCOUNTER — INFUSION (OUTPATIENT)
Dept: ONCOLOGY | Facility: HOSPITAL | Age: 32
End: 2022-04-19

## 2022-04-19 VITALS
SYSTOLIC BLOOD PRESSURE: 114 MMHG | RESPIRATION RATE: 18 BRPM | HEART RATE: 89 BPM | DIASTOLIC BLOOD PRESSURE: 63 MMHG | TEMPERATURE: 98 F

## 2022-04-19 DIAGNOSIS — D50.9 IRON DEFICIENCY ANEMIA, UNSPECIFIED IRON DEFICIENCY ANEMIA TYPE: Primary | ICD-10-CM

## 2022-04-19 DIAGNOSIS — K90.9 IRON MALABSORPTION: ICD-10-CM

## 2022-04-19 PROCEDURE — 25010000002 IRON SUCROSE PER 1 MG: Performed by: INTERNAL MEDICINE

## 2022-04-19 PROCEDURE — 96365 THER/PROPH/DIAG IV INF INIT: CPT | Performed by: INTERNAL MEDICINE

## 2022-04-19 RX ORDER — SODIUM CHLORIDE 9 MG/ML
250 INJECTION, SOLUTION INTRAVENOUS ONCE
Status: COMPLETED | OUTPATIENT
Start: 2022-04-19 | End: 2022-04-19

## 2022-04-19 RX ORDER — SODIUM CHLORIDE 9 MG/ML
250 INJECTION, SOLUTION INTRAVENOUS ONCE
Status: CANCELLED | OUTPATIENT
Start: 2022-04-20

## 2022-04-19 RX ADMIN — SODIUM CHLORIDE 250 ML: 9 INJECTION, SOLUTION INTRAVENOUS at 13:39

## 2022-04-19 RX ADMIN — IRON SUCROSE 200 MG: 20 INJECTION, SOLUTION INTRAVENOUS at 13:39

## 2022-04-20 ENCOUNTER — INFUSION (OUTPATIENT)
Dept: ONCOLOGY | Facility: HOSPITAL | Age: 32
End: 2022-04-20

## 2022-04-20 VITALS
DIASTOLIC BLOOD PRESSURE: 71 MMHG | RESPIRATION RATE: 20 BRPM | SYSTOLIC BLOOD PRESSURE: 106 MMHG | TEMPERATURE: 96.8 F | HEART RATE: 86 BPM

## 2022-04-20 DIAGNOSIS — D50.9 IRON DEFICIENCY ANEMIA, UNSPECIFIED IRON DEFICIENCY ANEMIA TYPE: Primary | ICD-10-CM

## 2022-04-20 DIAGNOSIS — K90.9 IRON MALABSORPTION: ICD-10-CM

## 2022-04-20 PROCEDURE — 96365 THER/PROPH/DIAG IV INF INIT: CPT | Performed by: INTERNAL MEDICINE

## 2022-04-20 PROCEDURE — 25010000002 IRON SUCROSE PER 1 MG: Performed by: INTERNAL MEDICINE

## 2022-04-20 RX ORDER — SODIUM CHLORIDE 9 MG/ML
250 INJECTION, SOLUTION INTRAVENOUS ONCE
Status: CANCELLED | OUTPATIENT
Start: 2022-04-20

## 2022-04-20 RX ORDER — SODIUM CHLORIDE 9 MG/ML
250 INJECTION, SOLUTION INTRAVENOUS ONCE
Status: COMPLETED | OUTPATIENT
Start: 2022-04-20 | End: 2022-04-20

## 2022-04-20 RX ADMIN — IRON SUCROSE 200 MG: 20 INJECTION, SOLUTION INTRAVENOUS at 13:52

## 2022-04-20 RX ADMIN — SODIUM CHLORIDE 250 ML: 9 INJECTION, SOLUTION INTRAVENOUS at 13:50

## 2022-04-21 ENCOUNTER — OFFICE VISIT (OUTPATIENT)
Dept: FAMILY MEDICINE CLINIC | Facility: CLINIC | Age: 32
End: 2022-04-21

## 2022-04-21 VITALS
HEART RATE: 89 BPM | BODY MASS INDEX: 45.58 KG/M2 | TEMPERATURE: 98.6 F | DIASTOLIC BLOOD PRESSURE: 72 MMHG | OXYGEN SATURATION: 99 % | RESPIRATION RATE: 16 BRPM | SYSTOLIC BLOOD PRESSURE: 112 MMHG | HEIGHT: 62 IN | WEIGHT: 247.7 LBS

## 2022-04-21 DIAGNOSIS — E66.01 MORBID (SEVERE) OBESITY DUE TO EXCESS CALORIES: ICD-10-CM

## 2022-04-21 PROCEDURE — 99213 OFFICE O/P EST LOW 20 MIN: CPT | Performed by: NURSE PRACTITIONER

## 2022-04-21 RX ORDER — PHENTERMINE HYDROCHLORIDE 37.5 MG/1
37.5 TABLET ORAL
Qty: 30 TABLET | Refills: 0 | Status: SHIPPED | OUTPATIENT
Start: 2022-04-21

## 2022-04-21 NOTE — PROGRESS NOTES
Subjective   Marizol Elkins is a 31 y.o. female.     has Iron deficiency anemia; Iron malabsorption; Morbidly obese (HCC); and Pre-operative cardiovascular examination on their problem list.     Chief Complaint   Patient presents with   • Weight Check        History of Present Illness   Obesity, Body mass index is 45.3 kg/m². weight today is 247 # 11.2 oz. She has lost 0.8 pounds.  Last seen on 3/24/22 for this and prescribed phentermine. She filled it on 22 and so has only been on it for 2 weeks. Her beginning weight was 248#  9.6 oz. Ht 62in and BMI was 45.47.    Should see a greater weight loss with a full month of phentermine use at next visit. Again advised   Increase intake of water to 6-8 glasses per day.  Cut out concentrated sugars and reduce simple carbs.  Count calories, measure servings  Use a smart phone melissa to help with counting calories and tracking weight loss, healthy lifestyle modifications.     Demonstrate at least a 1-2 pound / month weight loss with a healthy BP to continue on phentermine    Be aware that my routine practice is to allow qualified persons take phentermine for 3 months consecutively, then a one month holiday off it to prove the presence of lifestyle changes which are sustained, and may repeat this cycle as long as qualified and appropriate for phentermine.    If BMI falls below 27, the cycle will not continue    She wishes to continue. 2nd month of consecutive phentermine use is prescribed today.   No other complaint today.   Parts of most recent relevant visit HPI, ROS  and PE may be carried forward and all are updated as appropriate for current situation.    Past Surgical History:   Procedure Laterality Date   •  SECTION     • SALPINGO OOPHORECTOMY Right     Large ovarian cyst remotved at time of  removed   • TONSILLECTOMY AND ADENOIDECTOMY     • WISDOM TOOTH EXTRACTION        Social History     Socioeconomic History   • Marital status:     Tobacco Use   • Smoking status: Current Every Day Smoker     Packs/day: 1.00     Years: 10.00     Pack years: 10.00   • Smokeless tobacco: Never Used   Vaping Use   • Vaping Use: Never used   Substance and Sexual Activity   • Alcohol use: No   • Drug use: No   • Sexual activity: Yes     Partners: Male     Birth control/protection: None      The following portions of the patient's history were reviewed and updated as appropriate: allergies, current medications, past family history, past medical history, past social history, past surgical history and problem list.    Review of Systems   Constitutional: Negative.    HENT: Negative.  Negative for congestion.    Eyes: Negative.  Negative for blurred vision, double vision, photophobia and visual disturbance.   Respiratory: Negative.  Negative for cough, shortness of breath, wheezing and stridor.    Cardiovascular: Negative.  Negative for chest pain, palpitations and leg swelling.   Gastrointestinal: Negative.  Negative for abdominal distention, abdominal pain, blood in stool, constipation, diarrhea, nausea, vomiting, GERD and indigestion.   Endocrine: Negative.  Negative for cold intolerance and heat intolerance.   Genitourinary: Negative.  Negative for dysuria, flank pain, frequency and urinary incontinence.   Musculoskeletal: Negative.  Negative for arthralgias and back pain.   Skin: Negative.    Allergic/Immunologic: Negative.  Negative for immunocompromised state.   Neurological: Negative.    Hematological: Negative.    Psychiatric/Behavioral: Negative.  Negative for agitation, behavioral problems, decreased concentration, dysphoric mood, hallucinations, self-injury, sleep disturbance, suicidal ideas, negative for hyperactivity, depressed mood and stress. The patient is not nervous/anxious.    All other systems reviewed and are negative.    PHQ-9 Depression Screening  Little interest or pleasure in doing things?     Feeling down, depressed, or hopeless?     Trouble  falling or staying asleep, or sleeping too much?     Feeling tired or having little energy?     Poor appetite or overeating?     Feeling bad about yourself - or that you are a failure or have let yourself or your family down?     Trouble concentrating on things, such as reading the newspaper or watching television?     Moving or speaking so slowly that other people could have noticed? Or the opposite - being so fidgety or restless that you have been moving around a lot more than usual?     Thoughts that you would be better off dead, or of hurting yourself in some way?     PHQ-9 Total Score     If you checked off any problems, how difficult have these problems made it for you to do your work, take care of things at home, or get along with other people?      Patient understands the risks associated with this controlled medication, including tolerance and addiction.  Patient also agrees to only obtain this medication from me, and not from a another provider, unless that provider is covering for me in my absence.  Patient also agrees to be compliant in dosing, and not self adjust the dose of medication.  A signed controlled substance agreement is on file, and the patient has received a controlled substance education sheet at this a previous visit.  The patient has also signed a consent for treatment with a controlled substance as per [facility] policy. FILEMON was obtained.    Objective   Physical Exam  Vitals and nursing note reviewed.   Constitutional:       General: She is not in acute distress.     Appearance: Normal appearance. She is well-developed. She is obese. She is not ill-appearing or diaphoretic.   HENT:      Head: Normocephalic and atraumatic.   Eyes:      General: No scleral icterus.        Right eye: No discharge.         Left eye: No discharge.      Conjunctiva/sclera: Conjunctivae normal.      Pupils: Pupils are equal, round, and reactive to light.   Neck:      Thyroid: No thyromegaly.       "Vascular: No carotid bruit or JVD.      Trachea: No tracheal deviation.   Cardiovascular:      Rate and Rhythm: Normal rate and regular rhythm.      Pulses: Normal pulses.      Heart sounds: Normal heart sounds. No murmur heard.    No friction rub. No gallop.   Pulmonary:      Effort: Pulmonary effort is normal. No respiratory distress.      Breath sounds: Normal breath sounds. No stridor. No wheezing, rhonchi or rales.   Chest:      Chest wall: No tenderness.   Abdominal:      General: Bowel sounds are normal.      Palpations: Abdomen is soft.   Musculoskeletal:         General: No tenderness or deformity. Normal range of motion.      Cervical back: Normal range of motion and neck supple. No rigidity or tenderness.      Right lower leg: No edema.      Left lower leg: No edema.   Lymphadenopathy:      Cervical: No cervical adenopathy.   Skin:     General: Skin is warm and dry.      Capillary Refill: Capillary refill takes 2 to 3 seconds.      Coloration: Skin is not jaundiced or pale.      Findings: No bruising, erythema, lesion or rash.   Neurological:      General: No focal deficit present.      Mental Status: She is alert and oriented to person, place, and time. Mental status is at baseline.      Motor: No weakness.      Gait: Gait normal.   Psychiatric:         Mood and Affect: Mood normal.         Behavior: Behavior normal.         Thought Content: Thought content normal.         Judgment: Judgment normal.       Vitals:    04/21/22 0832   BP: 112/72   BP Location: Left arm   Patient Position: Sitting   Cuff Size: Adult   Pulse: 89   Resp: 16   Temp: 98.6 °F (37 °C)   SpO2: 99%   Weight: 112 kg (247 lb 11.2 oz)   Height: 157.5 cm (62\")   PainSc:   5   PainLoc: Generalized      Body mass index is 45.3 kg/m².      Assessment/Plan   Diagnoses and all orders for this visit:    1. Body mass index (BMI) of 45.0 to 49.9 in adult (HCC)  -     phentermine (ADIPEX-P) 37.5 MG tablet; Take 1 tablet by mouth Every Morning " Before Breakfast.  Dispense: 30 tablet; Refill: 0    2. Morbid (severe) obesity due to excess calories (HCC)  -     phentermine (ADIPEX-P) 37.5 MG tablet; Take 1 tablet by mouth Every Morning Before Breakfast.  Dispense: 30 tablet; Refill: 0               Return in about 4 weeks (around 5/19/2022).  There are no Patient Instructions on file for this visit.        This document has been electronically signed by GIL Darling on April 21, 2022 09:07 CDT

## 2022-04-26 PROBLEM — N93.9 ABNORMAL UTERINE BLEEDING: Status: ACTIVE | Noted: 2022-04-26

## 2022-05-10 DIAGNOSIS — R11.0 NAUSEA: ICD-10-CM

## 2022-05-10 DIAGNOSIS — M54.2 CHRONIC MIDLINE POSTERIOR NECK PAIN: ICD-10-CM

## 2022-05-10 DIAGNOSIS — E16.1 HYPERINSULINEMIA: ICD-10-CM

## 2022-05-10 DIAGNOSIS — M54.42 CHRONIC BILATERAL LOW BACK PAIN WITH BILATERAL SCIATICA: ICD-10-CM

## 2022-05-10 DIAGNOSIS — M54.41 CHRONIC BILATERAL LOW BACK PAIN WITH BILATERAL SCIATICA: ICD-10-CM

## 2022-05-10 DIAGNOSIS — G89.29 CHRONIC BILATERAL LOW BACK PAIN WITH BILATERAL SCIATICA: ICD-10-CM

## 2022-05-10 DIAGNOSIS — J45.20 MILD INTERMITTENT ASTHMA WITHOUT COMPLICATION: ICD-10-CM

## 2022-05-10 DIAGNOSIS — G89.29 CHRONIC MIDLINE POSTERIOR NECK PAIN: ICD-10-CM

## 2022-05-10 RX ORDER — ALBUTEROL SULFATE 90 UG/1
2 AEROSOL, METERED RESPIRATORY (INHALATION) EVERY 6 HOURS PRN
Qty: 18 G | Refills: 11 | Status: SHIPPED | OUTPATIENT
Start: 2022-05-10 | End: 2022-08-23 | Stop reason: SDUPTHER

## 2022-05-10 RX ORDER — NAPROXEN 500 MG/1
500 TABLET ORAL 2 TIMES DAILY PRN
Qty: 60 TABLET | Refills: 5 | Status: SHIPPED | OUTPATIENT
Start: 2022-05-10 | End: 2022-08-23 | Stop reason: SDUPTHER

## 2022-05-10 RX ORDER — ONDANSETRON 4 MG/1
4 TABLET, FILM COATED ORAL EVERY 8 HOURS PRN
Qty: 60 TABLET | Refills: 3 | Status: SHIPPED | OUTPATIENT
Start: 2022-05-10 | End: 2022-08-23 | Stop reason: SDUPTHER

## 2022-05-10 RX ORDER — HYDROCODONE BITARTRATE AND ACETAMINOPHEN 7.5; 325 MG/1; MG/1
1 TABLET ORAL 2 TIMES DAILY PRN
Qty: 45 TABLET | Refills: 0 | Status: SHIPPED | OUTPATIENT
Start: 2022-05-10 | End: 2022-06-20 | Stop reason: SDUPTHER

## 2022-05-26 ENCOUNTER — OFFICE VISIT (OUTPATIENT)
Dept: OBSTETRICS AND GYNECOLOGY | Facility: CLINIC | Age: 32
End: 2022-05-26

## 2022-05-26 VITALS
WEIGHT: 246 LBS | BODY MASS INDEX: 45.27 KG/M2 | DIASTOLIC BLOOD PRESSURE: 74 MMHG | SYSTOLIC BLOOD PRESSURE: 118 MMHG | HEIGHT: 62 IN

## 2022-05-26 DIAGNOSIS — Z01.818 PRE-OP EVALUATION: Primary | ICD-10-CM

## 2022-05-26 DIAGNOSIS — D50.9 IRON DEFICIENCY ANEMIA, UNSPECIFIED IRON DEFICIENCY ANEMIA TYPE: ICD-10-CM

## 2022-05-26 DIAGNOSIS — N93.9 ABNORMAL UTERINE BLEEDING: ICD-10-CM

## 2022-05-26 PROBLEM — Z01.810 PRE-OPERATIVE CARDIOVASCULAR EXAMINATION: Status: RESOLVED | Noted: 2021-10-22 | Resolved: 2022-05-26

## 2022-05-26 PROCEDURE — 99214 OFFICE O/P EST MOD 30 MIN: CPT | Performed by: OBSTETRICS & GYNECOLOGY

## 2022-05-26 NOTE — PROGRESS NOTES
[facility]  Gynecology  Date of Service: 2022     CC: Pre-op visit    HPI  Marizol Elkins is a 31 y.o.  premenopausal female who presents for pre-op visit.    She was seen in 2021 as a referral from GIL Solano secondary to heavy menses in the setting of iron deficiency anemia.  She has a history of iron deficiency anemia and was referred to Dr. Alexander in 2021.  She received 2 infusions of Injectafer in late 2021.  Her starting Hgb prior to treatment was 11.1.  The lowest Hgb in our system was 10.8 on 3/8/2021.  She states that she has always had heavy and irregular periods.  She said that they are every 21-35 days, lasting 7 days.  She will go through 1 tampon/hour.  Reports fatigue.  She has been on Naproxen for several months which has not helped.  She has been on DepoProvera in the past but gained 70 lbs.  She has also tried NSAIDs with no improvement.  At that visit, we attempted EMB but aborted due to cervical stenosis and patient intolerance.  She declined any other medical therapy.     TVUS (2022):  Uterus measures 11.5 x 4.3 x 6.6 cm.  Uterine cavity empty.  No significant endometrial thickening (maximal endometrial thickness 5.6 mm).  3.5 cm x 4.2 cm exophytic fundal fibroid.  Status post right oophorectomy.  Left ovary grossly normal with small follicles.  Normal Doppler flow to the left ovary.  No free fluid in the pelvis.    Most recent labs:   21 15:11   Iron 30 (L)   Ferritin 10.89 (L)   Iron Saturation 6 (L)   Transferrin 332   TIBC 495   Folate 8.50      21 15:11   WBC 9.06   RBC 4.81   Hemoglobin 10.8 (L)   Hematocrit 34.9   RDW 15.9 (H)   MCV 72.6 (L)   MCH 22.5 (L)   MCHC 30.9 (L)   MPV 9.6   Platelets 350      21 08:45   TSH Baseline 2.280     She had an EMB that was benign.  She states that she has continued to have heavy bleeding without any change.      She presents today for pre-op visit.  She has no  additional concerns.  She was prescribed Phentermine by her PCP on 2022; her last dose was on .    She is a CNA at [employer].    ROS  Review of Systems   Constitutional: Negative.    HENT: Negative.    Eyes: Negative.    Respiratory: Negative.    Cardiovascular: Negative.    Gastrointestinal: Negative.    Endocrine: Negative.    Genitourinary: Positive for menstrual problem and vaginal bleeding.   Musculoskeletal: Positive for back pain.   Skin: Negative.    Allergic/Immunologic: Negative.    Neurological: Negative.    Hematological: Negative.    Psychiatric/Behavioral: Negative.      GYN HISTORY  Menarche: age 8  Menses: Irregular, every 21-35 days, lasts 7 days, ++ heavy, no intermenstrual bleeding  History of STIs: Gonorrhea,   Last pap smear: 2021  Abnormal pap smear history: None  Contraception: None  S/p RSO at time of  for large ovarian cyst     OB HISTORY  OB History    Para Term  AB Living   3 1 1   2 1   SAB IAB Ectopic Molar Multiple Live Births   2         1      # Outcome Date GA Lbr Brennen/2nd Weight Sex Delivery Anes PTL Lv   3 Term 2010     CS-Unspec   LUPILLO   2 SAB      SAB      1 SAB      SAB        PAST MEDICAL HISTORY  Past Medical History:   Diagnosis Date   • Anxiety    • Arthritis    • Asthma    • Bipolar disease, chronic (HCC)    • Endometriosis    • Gonorrhea    • Kidney stone    • Mild depression    • Polycystic ovary syndrome    • PONV (postoperative nausea and vomiting)    • Prediabetes      PAST SURGICAL HISTORY  Past Surgical History:   Procedure Laterality Date   •  SECTION     • SALPINGO OOPHORECTOMY Right 2010    Large ovarian cyst remotved at time of  removed   • TONSILLECTOMY AND ADENOIDECTOMY     • WISDOM TOOTH EXTRACTION       FAMILY HISTORY  Family History   Problem Relation Age of Onset   • Ovarian cancer Mother    • Diabetes Father    • Heart disease Father    • Hypertension Maternal Grandmother    •  Breast cancer Paternal Grandmother    • Colon cancer Neg Hx    • Endometrial cancer Neg Hx    • Ovarian cancer Neg Hx      SOCIAL HISTORY  Social History     Socioeconomic History   • Marital status:    Tobacco Use   • Smoking status: Current Every Day Smoker     Packs/day: 1.00     Years: 10.00     Pack years: 10.00   • Smokeless tobacco: Never Used   Vaping Use   • Vaping Use: Never used   Substance and Sexual Activity   • Alcohol use: No   • Drug use: No   • Sexual activity: Yes     Partners: Male     Birth control/protection: None     ALLERGIES  Allergies   Allergen Reactions   • Penicillins Anaphylaxis   • Tramadol Anaphylaxis and Rash   • Fluoxetine Nausea Only and Other (See Comments)     Headaches   • Latex Swelling and Hives   • Bupropion Other (See Comments)     Makes her feel bad   • Celexa [Citalopram Hydrobromide] Anxiety   • Codeine Rash   • Cymbalta [Duloxetine Hcl] Anxiety   • Effexor [Venlafaxine] Anxiety   • Geodon [Ziprasidone Hcl] Anxiety   • Lamictal [Lamotrigine] Anxiety   • Latuda [Lurasidone Hcl] Anxiety   • Paxil [Paroxetine Hcl] Anxiety   • Viibryd [Vilazodone Hcl] Anxiety   • Zoloft [Sertraline Hcl] Anxiety     HOME MEDICATIONS  Prior to Admission medications    Medication Sig Start Date End Date Taking? Authorizing Provider   famotidine (PEPCID) 40 MG tablet Take 1 tablet by mouth Daily. 1/24/22   Juhi Gordon APRN   HYDROcodone-acetaminophen (NORCO) 7.5-325 MG per tablet Take 1 tablet by mouth 2 (Two) Times a Day As Needed for Moderate Pain . For chronic low back pain M.54.41, M54.42 2/24/22   Angelina Rice MD   metFORMIN (Glucophage) 1000 MG tablet Take 1 tablet by mouth 2 (Two) Times a Day With Meals. 2/24/22   Angelina Rice MD   miSOPROStol (Cytotec) 200 MCG tablet Place 1 tablet in the vagina 8 hours prior to biopsy; place 1 tablet in the vagina 4 hours prior to biopsy. 1/11/22   Sia Sunshine MD   naproxen (Naprosyn) 500 MG tablet Take 1 tablet  "by mouth 2 (Two) Times a Day As Needed for Mild Pain  (back pain, menstrual pain). 22   Angelina Rice MD   ondansetron (ZOFRAN) 4 MG tablet Take 1 tablet by mouth Every 8 (Eight) Hours As Needed for Nausea or Vomiting. 22   Angelina Rice MD   pantoprazole (PROTONIX) 40 MG EC tablet Take 1 tablet by mouth Daily. For acid reflux 22   Gordon, GIL Barr   phentermine (ADIPEX-P) 37.5 MG tablet Take 1 tablet by mouth Every Morning Before Breakfast. 22   Gordon, GIL Barr   Ventolin  (90 Base) MCG/ACT inhaler Inhale 2 puffs Every 6 (Six) Hours As Needed for Wheezing or Shortness of Air. Ventolin name brand only. 22   Angelina Rice MD     PE  /74   Ht 157.5 cm (62\")   Wt 112 kg (246 lb)   BMI 44.99 kg/m²        General: Alert, healthy, no distress, well nourished and well developed.  Neurologic: Alert, oriented to person, place, and time.  Gait normal.  Cranial nerves II-XII grossly intact.  HEENT: Normocephalic, atraumatic.  Extraocular muscles intact, pupils equal and reactive times two.    Neck: Supple, no adenopathy, thyroid normal size, non-tender, without nodularity, trachea midline.  Lungs: Normal respiratory effort.  Clear to auscultation bilaterally.  No wheezes, rhonci, or rales.  Heart: Regular rate and rhythm.  No murmer, rub or gallop.  Abdomen: Soft, non-tender, non-distended,no masses, no hepatosplenomegaly, no hernia.  Skin: No rash, no lesions.  Extremities: No cyanosis, clubbing or edema.    IMPRESSION  Marizol Elkins is a 31 y.o.  presenting with AUB with iron deficiency anemia requiring IV iron infusions in .  She has failed NSAIDs and had significant weight gain with DepoProvera and declined any other medical therapy.  Declines ovarian preservation.  Discussed menopausal symptoms and risks/benefits of hormone replacement therapy.  This included Women's Health Initiative data including decreased risk of colon cancer, decrease " in osteoporosis, slight increase in breast cancer risk, and slight increase risk of DVT/clot.  Proceed with total laparoscopic hysterectomy, left salpingo-oophorectomy, possible exploratory laparotomy, cystoscopy.  Discussed high suspicion for conversion to open hysterectomy due to size of uterus and her fibroids; voices understanding.  Discussed R/B/A.  Discussed risks including injury to surrounding organ (bowel, bladder, ureters, blood vessels, nerves), infection, bleeding (possibly requiring blood transfusion), heart attack, stroke, and death.  Discussed possible need for conversion to open procedure.  Will give pre-op ABX (Ancef 2g).      1. Pre-op evaluation    2. Abnormal uterine bleeding    3. Iron deficiency anemia, unspecified iron deficiency anemia type    This document has been electronically signed by Sia Sunshine MD on May 26, 2022 15:10 CDT.

## 2022-05-31 ENCOUNTER — LAB (OUTPATIENT)
Dept: LAB | Facility: HOSPITAL | Age: 32
End: 2022-05-31

## 2022-05-31 ENCOUNTER — ANESTHESIA EVENT (OUTPATIENT)
Dept: PERIOP | Facility: HOSPITAL | Age: 32
End: 2022-05-31

## 2022-05-31 ENCOUNTER — PRE-ADMISSION TESTING (OUTPATIENT)
Dept: PREADMISSION TESTING | Facility: HOSPITAL | Age: 32
End: 2022-05-31

## 2022-05-31 VITALS
RESPIRATION RATE: 16 BRPM | SYSTOLIC BLOOD PRESSURE: 140 MMHG | DIASTOLIC BLOOD PRESSURE: 90 MMHG | HEIGHT: 62 IN | OXYGEN SATURATION: 97 % | WEIGHT: 245 LBS | HEART RATE: 92 BPM | BODY MASS INDEX: 45.08 KG/M2

## 2022-05-31 DIAGNOSIS — N93.9 ABNORMAL UTERINE BLEEDING: ICD-10-CM

## 2022-05-31 LAB
ABO GROUP BLD: NORMAL
ANION GAP SERPL CALCULATED.3IONS-SCNC: 12 MMOL/L (ref 5–15)
BASOPHILS # BLD AUTO: 0.03 10*3/MM3 (ref 0–0.2)
BASOPHILS NFR BLD AUTO: 0.4 % (ref 0–1.5)
BLD GP AB SCN SERPL QL: NEGATIVE
BUN SERPL-MCNC: 9 MG/DL (ref 6–20)
BUN/CREAT SERPL: 12.5 (ref 7–25)
CALCIUM SPEC-SCNC: 9.3 MG/DL (ref 8.6–10.5)
CHLORIDE SERPL-SCNC: 102 MMOL/L (ref 98–107)
CO2 SERPL-SCNC: 23 MMOL/L (ref 22–29)
CREAT SERPL-MCNC: 0.72 MG/DL (ref 0.57–1)
DEPRECATED RDW RBC AUTO: 46.5 FL (ref 37–54)
EGFRCR SERPLBLD CKD-EPI 2021: 113.4 ML/MIN/1.73
EOSINOPHIL # BLD AUTO: 0.26 10*3/MM3 (ref 0–0.4)
EOSINOPHIL NFR BLD AUTO: 3.3 % (ref 0.3–6.2)
ERYTHROCYTE [DISTWIDTH] IN BLOOD BY AUTOMATED COUNT: 15.4 % (ref 12.3–15.4)
GLUCOSE SERPL-MCNC: 96 MG/DL (ref 65–99)
HCT VFR BLD AUTO: 39.2 % (ref 34–46.6)
HGB BLD-MCNC: 13.4 G/DL (ref 12–15.9)
IMM GRANULOCYTES # BLD AUTO: 0.03 10*3/MM3 (ref 0–0.05)
IMM GRANULOCYTES NFR BLD AUTO: 0.4 % (ref 0–0.5)
LYMPHOCYTES # BLD AUTO: 2.33 10*3/MM3 (ref 0.7–3.1)
LYMPHOCYTES NFR BLD AUTO: 29.9 % (ref 19.6–45.3)
Lab: NORMAL
MCH RBC QN AUTO: 28.1 PG (ref 26.6–33)
MCHC RBC AUTO-ENTMCNC: 34.2 G/DL (ref 31.5–35.7)
MCV RBC AUTO: 82.2 FL (ref 79–97)
MONOCYTES # BLD AUTO: 0.54 10*3/MM3 (ref 0.1–0.9)
MONOCYTES NFR BLD AUTO: 6.9 % (ref 5–12)
NEUTROPHILS NFR BLD AUTO: 4.59 10*3/MM3 (ref 1.7–7)
NEUTROPHILS NFR BLD AUTO: 59.1 % (ref 42.7–76)
NRBC BLD AUTO-RTO: 0 /100 WBC (ref 0–0.2)
PLATELET # BLD AUTO: 302 10*3/MM3 (ref 140–450)
PMV BLD AUTO: 9.4 FL (ref 6–12)
POTASSIUM SERPL-SCNC: 3.9 MMOL/L (ref 3.5–5.2)
RBC # BLD AUTO: 4.77 10*6/MM3 (ref 3.77–5.28)
RH BLD: POSITIVE
SARS-COV-2 N GENE RESP QL NAA+PROBE: NOT DETECTED
SODIUM SERPL-SCNC: 137 MMOL/L (ref 136–145)
T&S EXPIRATION DATE: NORMAL
WBC NRBC COR # BLD: 7.78 10*3/MM3 (ref 3.4–10.8)

## 2022-05-31 PROCEDURE — C9803 HOPD COVID-19 SPEC COLLECT: HCPCS

## 2022-05-31 PROCEDURE — 86900 BLOOD TYPING SEROLOGIC ABO: CPT

## 2022-05-31 PROCEDURE — 86850 RBC ANTIBODY SCREEN: CPT

## 2022-05-31 PROCEDURE — 80048 BASIC METABOLIC PNL TOTAL CA: CPT

## 2022-05-31 PROCEDURE — 85025 COMPLETE CBC W/AUTO DIFF WBC: CPT

## 2022-05-31 PROCEDURE — 86901 BLOOD TYPING SEROLOGIC RH(D): CPT

## 2022-05-31 PROCEDURE — 87635 SARS-COV-2 COVID-19 AMP PRB: CPT

## 2022-05-31 PROCEDURE — 36415 COLL VENOUS BLD VENIPUNCTURE: CPT

## 2022-06-01 ENCOUNTER — ANESTHESIA (OUTPATIENT)
Dept: PERIOP | Facility: HOSPITAL | Age: 32
End: 2022-06-01

## 2022-06-01 ENCOUNTER — HOSPITAL ENCOUNTER (OUTPATIENT)
Facility: HOSPITAL | Age: 32
Setting detail: HOSPITAL OUTPATIENT SURGERY
Discharge: HOME | End: 2022-06-01
Attending: OBSTETRICS & GYNECOLOGY | Admitting: OBSTETRICS & GYNECOLOGY

## 2022-06-01 VITALS
HEART RATE: 93 BPM | WEIGHT: 243.61 LBS | TEMPERATURE: 97.8 F | BODY MASS INDEX: 44.83 KG/M2 | OXYGEN SATURATION: 95 % | RESPIRATION RATE: 18 BRPM | HEIGHT: 62 IN | DIASTOLIC BLOOD PRESSURE: 64 MMHG | SYSTOLIC BLOOD PRESSURE: 110 MMHG

## 2022-06-01 DIAGNOSIS — N93.9 ABNORMAL UTERINE BLEEDING: ICD-10-CM

## 2022-06-01 DIAGNOSIS — Z90.710 STATUS POST HYSTERECTOMY WITH OOPHORECTOMY: Primary | ICD-10-CM

## 2022-06-01 DIAGNOSIS — Z90.721 STATUS POST HYSTERECTOMY WITH OOPHORECTOMY: Primary | ICD-10-CM

## 2022-06-01 LAB — B-HCG UR QL: NEGATIVE

## 2022-06-01 PROCEDURE — 25010000002 MIDAZOLAM PER 1 MG: Performed by: NURSE ANESTHETIST, CERTIFIED REGISTERED

## 2022-06-01 PROCEDURE — 25010000002 HYDROMORPHONE 1 MG/ML SOLUTION: Performed by: NURSE ANESTHETIST, CERTIFIED REGISTERED

## 2022-06-01 PROCEDURE — 25010000002 ONDANSETRON PER 1 MG: Performed by: NURSE ANESTHETIST, CERTIFIED REGISTERED

## 2022-06-01 PROCEDURE — 81025 URINE PREGNANCY TEST: CPT | Performed by: OBSTETRICS & GYNECOLOGY

## 2022-06-01 PROCEDURE — 58571 TLH W/T/O 250 G OR LESS: CPT | Performed by: SPECIALIST/TECHNOLOGIST, OTHER

## 2022-06-01 PROCEDURE — 25010000002 NEOSTIGMINE 10 MG/10ML SOLUTION: Performed by: NURSE ANESTHETIST, CERTIFIED REGISTERED

## 2022-06-01 PROCEDURE — 25010000002 ROPIVACAINE PER 1 MG: Performed by: OBSTETRICS & GYNECOLOGY

## 2022-06-01 PROCEDURE — 25010000002 DEXAMETHASONE PER 1 MG: Performed by: NURSE ANESTHETIST, CERTIFIED REGISTERED

## 2022-06-01 PROCEDURE — 25010000002 GENTAMICIN PER 80 MG: Performed by: OBSTETRICS & GYNECOLOGY

## 2022-06-01 PROCEDURE — 58571 TLH W/T/O 250 G OR LESS: CPT | Performed by: OBSTETRICS & GYNECOLOGY

## 2022-06-01 PROCEDURE — 25010000002 ONDANSETRON PER 1 MG: Performed by: ANESTHESIOLOGY

## 2022-06-01 PROCEDURE — 25010000002 FENTANYL CITRATE (PF) 50 MCG/ML SOLUTION: Performed by: NURSE ANESTHETIST, CERTIFIED REGISTERED

## 2022-06-01 PROCEDURE — 25010000002 KETOROLAC TROMETHAMINE PER 15 MG: Performed by: NURSE ANESTHETIST, CERTIFIED REGISTERED

## 2022-06-01 PROCEDURE — 0 MEPERIDINE PER 100 MG: Performed by: NURSE ANESTHETIST, CERTIFIED REGISTERED

## 2022-06-01 PROCEDURE — 25010000002 PROPOFOL 10 MG/ML EMULSION: Performed by: NURSE ANESTHETIST, CERTIFIED REGISTERED

## 2022-06-01 DEVICE — FLOSEAL HEMOSTATIC MATRIX, 10ML
Type: IMPLANTABLE DEVICE | Site: ABDOMEN | Status: FUNCTIONAL
Brand: FLOSEAL HEMOSTATIC MATRIX

## 2022-06-01 DEVICE — ABSORBABLE RELOAD
Type: IMPLANTABLE DEVICE | Site: ABDOMEN | Status: FUNCTIONAL
Brand: V-LOC 180

## 2022-06-01 RX ORDER — ACETAMINOPHEN 650 MG/1
650 SUPPOSITORY RECTAL ONCE AS NEEDED
Status: DISCONTINUED | OUTPATIENT
Start: 2022-06-01 | End: 2022-06-01 | Stop reason: HOSPADM

## 2022-06-01 RX ORDER — BUPIVACAINE HYDROCHLORIDE 2.5 MG/ML
INJECTION, SOLUTION EPIDURAL; INFILTRATION; INTRACAUDAL AS NEEDED
Status: DISCONTINUED | OUTPATIENT
Start: 2022-06-01 | End: 2022-06-01 | Stop reason: HOSPADM

## 2022-06-01 RX ORDER — ACETAMINOPHEN 500 MG
1000 TABLET ORAL EVERY 6 HOURS PRN
Qty: 30 TABLET | Refills: 1 | Status: SHIPPED | OUTPATIENT
Start: 2022-06-01 | End: 2022-08-08

## 2022-06-01 RX ORDER — KETOROLAC TROMETHAMINE 30 MG/ML
INJECTION, SOLUTION INTRAMUSCULAR; INTRAVENOUS AS NEEDED
Status: DISCONTINUED | OUTPATIENT
Start: 2022-06-01 | End: 2022-06-01 | Stop reason: SURG

## 2022-06-01 RX ORDER — PROPOFOL 10 MG/ML
VIAL (ML) INTRAVENOUS AS NEEDED
Status: DISCONTINUED | OUTPATIENT
Start: 2022-06-01 | End: 2022-06-01 | Stop reason: SURG

## 2022-06-01 RX ORDER — DEXAMETHASONE SODIUM PHOSPHATE 4 MG/ML
INJECTION, SOLUTION INTRA-ARTICULAR; INTRALESIONAL; INTRAMUSCULAR; INTRAVENOUS; SOFT TISSUE AS NEEDED
Status: DISCONTINUED | OUTPATIENT
Start: 2022-06-01 | End: 2022-06-01 | Stop reason: SURG

## 2022-06-01 RX ORDER — DIPHENHYDRAMINE HYDROCHLORIDE 50 MG/ML
12.5 INJECTION INTRAMUSCULAR; INTRAVENOUS
Status: DISCONTINUED | OUTPATIENT
Start: 2022-06-01 | End: 2022-06-01 | Stop reason: HOSPADM

## 2022-06-01 RX ORDER — ONDANSETRON 2 MG/ML
INJECTION INTRAMUSCULAR; INTRAVENOUS AS NEEDED
Status: DISCONTINUED | OUTPATIENT
Start: 2022-06-01 | End: 2022-06-01 | Stop reason: SURG

## 2022-06-01 RX ORDER — SODIUM CHLORIDE 0.9 % (FLUSH) 0.9 %
10 SYRINGE (ML) INJECTION AS NEEDED
Status: DISCONTINUED | OUTPATIENT
Start: 2022-06-01 | End: 2022-06-01 | Stop reason: HOSPADM

## 2022-06-01 RX ORDER — ROPIVACAINE HYDROCHLORIDE 5 MG/ML
INJECTION, SOLUTION EPIDURAL; INFILTRATION; PERINEURAL AS NEEDED
Status: DISCONTINUED | OUTPATIENT
Start: 2022-06-01 | End: 2022-06-01 | Stop reason: HOSPADM

## 2022-06-01 RX ORDER — SODIUM CHLORIDE, SODIUM GLUCONATE, SODIUM ACETATE, POTASSIUM CHLORIDE AND MAGNESIUM CHLORIDE 526; 502; 368; 37; 30 MG/100ML; MG/100ML; MG/100ML; MG/100ML; MG/100ML
INJECTION, SOLUTION INTRAVENOUS CONTINUOUS PRN
Status: DISCONTINUED | OUTPATIENT
Start: 2022-06-01 | End: 2022-06-01 | Stop reason: SURG

## 2022-06-01 RX ORDER — PROMETHAZINE HYDROCHLORIDE 25 MG/1
25 TABLET ORAL ONCE AS NEEDED
Status: DISCONTINUED | OUTPATIENT
Start: 2022-06-01 | End: 2022-06-01 | Stop reason: HOSPADM

## 2022-06-01 RX ORDER — ONDANSETRON 2 MG/ML
4 INJECTION INTRAMUSCULAR; INTRAVENOUS ONCE
Status: COMPLETED | OUTPATIENT
Start: 2022-06-01 | End: 2022-06-01

## 2022-06-01 RX ORDER — FLUMAZENIL 0.1 MG/ML
0.2 INJECTION INTRAVENOUS AS NEEDED
Status: DISCONTINUED | OUTPATIENT
Start: 2022-06-01 | End: 2022-06-01 | Stop reason: HOSPADM

## 2022-06-01 RX ORDER — CLINDAMYCIN PHOSPHATE 900 MG/50ML
900 INJECTION INTRAVENOUS ONCE
Status: COMPLETED | OUTPATIENT
Start: 2022-06-01 | End: 2022-06-01

## 2022-06-01 RX ORDER — ROCURONIUM BROMIDE 10 MG/ML
INJECTION, SOLUTION INTRAVENOUS AS NEEDED
Status: DISCONTINUED | OUTPATIENT
Start: 2022-06-01 | End: 2022-06-01 | Stop reason: SURG

## 2022-06-01 RX ORDER — ONDANSETRON 2 MG/ML
4 INJECTION INTRAMUSCULAR; INTRAVENOUS ONCE AS NEEDED
Status: DISCONTINUED | OUTPATIENT
Start: 2022-06-01 | End: 2022-06-01 | Stop reason: HOSPADM

## 2022-06-01 RX ORDER — FENTANYL CITRATE 50 UG/ML
INJECTION, SOLUTION INTRAMUSCULAR; INTRAVENOUS AS NEEDED
Status: DISCONTINUED | OUTPATIENT
Start: 2022-06-01 | End: 2022-06-01 | Stop reason: SURG

## 2022-06-01 RX ORDER — SCOLOPAMINE TRANSDERMAL SYSTEM 1 MG/1
1 PATCH, EXTENDED RELEASE TRANSDERMAL ONCE
Status: DISCONTINUED | OUTPATIENT
Start: 2022-06-01 | End: 2022-06-01 | Stop reason: HOSPADM

## 2022-06-01 RX ORDER — DOCUSATE SODIUM 250 MG
250 CAPSULE ORAL 2 TIMES DAILY
Qty: 60 CAPSULE | Refills: 1 | Status: SHIPPED | OUTPATIENT
Start: 2022-06-01 | End: 2022-08-08

## 2022-06-01 RX ORDER — HYDROMORPHONE HYDROCHLORIDE 2 MG/1
2 TABLET ORAL ONCE
Status: COMPLETED | OUTPATIENT
Start: 2022-06-01 | End: 2022-06-01

## 2022-06-01 RX ORDER — MEPERIDINE HYDROCHLORIDE 25 MG/ML
12.5 INJECTION INTRAMUSCULAR; INTRAVENOUS; SUBCUTANEOUS
Status: COMPLETED | OUTPATIENT
Start: 2022-06-01 | End: 2022-06-01

## 2022-06-01 RX ORDER — IBUPROFEN 600 MG/1
600 TABLET ORAL EVERY 6 HOURS PRN
Status: DISCONTINUED | OUTPATIENT
Start: 2022-06-01 | End: 2022-06-01 | Stop reason: HOSPADM

## 2022-06-01 RX ORDER — SODIUM CHLORIDE 0.9 % (FLUSH) 0.9 %
3 SYRINGE (ML) INJECTION EVERY 12 HOURS SCHEDULED
Status: DISCONTINUED | OUTPATIENT
Start: 2022-06-01 | End: 2022-06-01 | Stop reason: HOSPADM

## 2022-06-01 RX ORDER — SIMETHICONE 80 MG
80 TABLET,CHEWABLE ORAL EVERY 6 HOURS PRN
Qty: 30 TABLET | Refills: 1 | Status: SHIPPED | OUTPATIENT
Start: 2022-06-01 | End: 2022-06-16

## 2022-06-01 RX ORDER — SODIUM CHLORIDE, SODIUM LACTATE, POTASSIUM CHLORIDE, CALCIUM CHLORIDE 600; 310; 30; 20 MG/100ML; MG/100ML; MG/100ML; MG/100ML
125 INJECTION, SOLUTION INTRAVENOUS CONTINUOUS
Status: DISCONTINUED | OUTPATIENT
Start: 2022-06-01 | End: 2022-06-01 | Stop reason: HOSPADM

## 2022-06-01 RX ORDER — IBUPROFEN 800 MG/1
800 TABLET ORAL EVERY 8 HOURS PRN
Qty: 30 TABLET | Refills: 1 | Status: SHIPPED | OUTPATIENT
Start: 2022-06-01 | End: 2022-08-08

## 2022-06-01 RX ORDER — EPHEDRINE SULFATE 50 MG/ML
5 INJECTION, SOLUTION INTRAVENOUS ONCE AS NEEDED
Status: DISCONTINUED | OUTPATIENT
Start: 2022-06-01 | End: 2022-06-01 | Stop reason: HOSPADM

## 2022-06-01 RX ORDER — NEOSTIGMINE METHYLSULFATE 1 MG/ML
INJECTION, SOLUTION INTRAVENOUS AS NEEDED
Status: DISCONTINUED | OUTPATIENT
Start: 2022-06-01 | End: 2022-06-01 | Stop reason: SURG

## 2022-06-01 RX ORDER — ACETAMINOPHEN 325 MG/1
650 TABLET ORAL ONCE AS NEEDED
Status: DISCONTINUED | OUTPATIENT
Start: 2022-06-01 | End: 2022-06-01 | Stop reason: HOSPADM

## 2022-06-01 RX ORDER — PROMETHAZINE HYDROCHLORIDE 25 MG/1
25 SUPPOSITORY RECTAL ONCE AS NEEDED
Status: DISCONTINUED | OUTPATIENT
Start: 2022-06-01 | End: 2022-06-01 | Stop reason: HOSPADM

## 2022-06-01 RX ORDER — HYDROMORPHONE HYDROCHLORIDE 2 MG/1
2 TABLET ORAL
Qty: 20 TABLET | Refills: 0 | Status: SHIPPED | OUTPATIENT
Start: 2022-06-01 | End: 2022-06-16

## 2022-06-01 RX ORDER — MIDAZOLAM HYDROCHLORIDE 1 MG/ML
INJECTION INTRAMUSCULAR; INTRAVENOUS AS NEEDED
Status: DISCONTINUED | OUTPATIENT
Start: 2022-06-01 | End: 2022-06-01 | Stop reason: SURG

## 2022-06-01 RX ORDER — NALOXONE HCL 0.4 MG/ML
0.4 VIAL (ML) INJECTION AS NEEDED
Status: DISCONTINUED | OUTPATIENT
Start: 2022-06-01 | End: 2022-06-01 | Stop reason: HOSPADM

## 2022-06-01 RX ADMIN — DEXAMETHASONE SODIUM PHOSPHATE 10 MG: 4 INJECTION, SOLUTION INTRAMUSCULAR; INTRAVENOUS at 07:26

## 2022-06-01 RX ADMIN — HYDROMORPHONE HYDROCHLORIDE 0.5 MG: 1 INJECTION, SOLUTION INTRAMUSCULAR; INTRAVENOUS; SUBCUTANEOUS at 09:07

## 2022-06-01 RX ADMIN — ONDANSETRON 4 MG: 2 INJECTION INTRAMUSCULAR; INTRAVENOUS at 06:45

## 2022-06-01 RX ADMIN — PROPOFOL 200 MG: 10 INJECTION, EMULSION INTRAVENOUS at 07:13

## 2022-06-01 RX ADMIN — SODIUM CHLORIDE, SODIUM GLUCONATE, SODIUM ACETATE, POTASSIUM CHLORIDE AND MAGNESIUM CHLORIDE: 526; 502; 368; 37; 30 INJECTION, SOLUTION INTRAVENOUS at 08:39

## 2022-06-01 RX ADMIN — FENTANYL CITRATE 50 MCG: 50 INJECTION, SOLUTION INTRAMUSCULAR; INTRAVENOUS at 07:59

## 2022-06-01 RX ADMIN — HYDROMORPHONE HYDROCHLORIDE 0.5 MG: 1 INJECTION, SOLUTION INTRAMUSCULAR; INTRAVENOUS; SUBCUTANEOUS at 10:18

## 2022-06-01 RX ADMIN — ROCURONIUM BROMIDE 10 MG: 10 INJECTION INTRAVENOUS at 08:32

## 2022-06-01 RX ADMIN — SODIUM CHLORIDE, POTASSIUM CHLORIDE, SODIUM LACTATE AND CALCIUM CHLORIDE 125 ML/HR: 600; 310; 30; 20 INJECTION, SOLUTION INTRAVENOUS at 06:23

## 2022-06-01 RX ADMIN — HYDROMORPHONE HYDROCHLORIDE 0.5 MG: 1 INJECTION, SOLUTION INTRAMUSCULAR; INTRAVENOUS; SUBCUTANEOUS at 09:58

## 2022-06-01 RX ADMIN — MIDAZOLAM HYDROCHLORIDE 2 MG: 1 INJECTION, SOLUTION INTRAMUSCULAR; INTRAVENOUS at 07:09

## 2022-06-01 RX ADMIN — CLINDAMYCIN PHOSPHATE 900 MG: 900 INJECTION, SOLUTION INTRAVENOUS at 07:20

## 2022-06-01 RX ADMIN — GLYCOPYRROLATE 0.8 MG: 0.2 INJECTION, SOLUTION INTRAMUSCULAR; INTRAVITREAL at 09:15

## 2022-06-01 RX ADMIN — FENTANYL CITRATE 100 MCG: 50 INJECTION, SOLUTION INTRAMUSCULAR; INTRAVENOUS at 07:10

## 2022-06-01 RX ADMIN — HYDROMORPHONE HYDROCHLORIDE 2 MG: 2 TABLET ORAL at 13:06

## 2022-06-01 RX ADMIN — ONDANSETRON 8 MG: 2 INJECTION INTRAMUSCULAR; INTRAVENOUS at 09:17

## 2022-06-01 RX ADMIN — FENTANYL CITRATE 50 MCG: 50 INJECTION, SOLUTION INTRAMUSCULAR; INTRAVENOUS at 08:28

## 2022-06-01 RX ADMIN — NEOSTIGMINE METHYLSULFATE 5 MG: 0.5 INJECTION INTRAVENOUS at 09:15

## 2022-06-01 RX ADMIN — KETOROLAC TROMETHAMINE 30 MG: 30 INJECTION, SOLUTION INTRAMUSCULAR at 09:17

## 2022-06-01 RX ADMIN — ROCURONIUM BROMIDE 5 MG: 10 INJECTION INTRAVENOUS at 09:07

## 2022-06-01 RX ADMIN — ROCURONIUM BROMIDE 5 MG: 10 INJECTION INTRAVENOUS at 08:46

## 2022-06-01 RX ADMIN — GENTAMICIN SULFATE 380 MG: 40 INJECTION, SOLUTION INTRAMUSCULAR; INTRAVENOUS at 07:30

## 2022-06-01 RX ADMIN — ROCURONIUM BROMIDE 10 MG: 10 INJECTION INTRAVENOUS at 08:05

## 2022-06-01 RX ADMIN — MEPERIDINE HYDROCHLORIDE 12.5 MG: 25 INJECTION, SOLUTION INTRAMUSCULAR; INTRAVENOUS; SUBCUTANEOUS at 10:20

## 2022-06-01 RX ADMIN — SCOLOPAMINE TRANSDERMAL SYSTEM 1 PATCH: 1 PATCH, EXTENDED RELEASE TRANSDERMAL at 06:46

## 2022-06-01 RX ADMIN — MEPERIDINE HYDROCHLORIDE 12.5 MG: 25 INJECTION, SOLUTION INTRAMUSCULAR; INTRAVENOUS; SUBCUTANEOUS at 10:30

## 2022-06-01 RX ADMIN — ROCURONIUM BROMIDE 50 MG: 10 INJECTION INTRAVENOUS at 07:16

## 2022-06-01 RX ADMIN — HYDROMORPHONE HYDROCHLORIDE 0.5 MG: 1 INJECTION, SOLUTION INTRAMUSCULAR; INTRAVENOUS; SUBCUTANEOUS at 09:00

## 2022-06-01 RX ADMIN — FENTANYL CITRATE 50 MCG: 50 INJECTION, SOLUTION INTRAMUSCULAR; INTRAVENOUS at 08:17

## 2022-06-01 RX ADMIN — HYDROMORPHONE HYDROCHLORIDE 0.5 MG: 1 INJECTION, SOLUTION INTRAMUSCULAR; INTRAVENOUS; SUBCUTANEOUS at 10:08

## 2022-06-01 NOTE — INTERVAL H&P NOTE
H&P reviewed. The patient was examined and there are no changes to the H&P. Proceed with TLH/LSO, possible ex lap, cystoscopy. Risks previously discussed.     This document has been electronically signed by Sia Sunshine MD on June 1, 2022 06:28 CDT.

## 2022-06-01 NOTE — OP NOTE
[facility]  Operative Report    Name: Marizol Elkins  MRN: [id number]  Date: 2022  CSN: [id number]      Location: [facility] OR Room #10    Service: Gynecology    Pre-op Diagnosis:   1.  Abnormal uterine bleeding  2.  Uterine leiomyomas  3.  Iron deficiency anemia requiring IV iron infusions  4.  Class III obesity, Body mass index is 44.56 kg/m².     Post-op Diagnosis: Same    Surgeon: Sia Sunshine MD FACOG    Assistant: ALTON Balderas CSA    Staff:  Circulator: Alondra Nieto RN; Bernie Dean RN  Scrub Person: Mary De León  Assistant: Aminta Dooley CSA; Geena Loco CSA    Anesthesia: General ETT    Anesthesia Staff:  Anesthesiologist: Vaughn Hubbard MD  CRNA: Evelyn Rivero CRNA  Student Nurse Anesthetist: Bruce Gramajo SRNA    Operation: Total laparoscopic hysterectomy, left salpingo-oophorectomy, cystoscopy    Drains: None (Mc catheter removed at the end of the procedure)    Complications: None    Findings: Obese abdomen.  Uterus normal with multiple leiomyomas: fundal subserosal fibroid 4 cm, posterior subserosal fibroid 1 cm, anterior left subserosal fibroid 1 cm.  Right adnexa surgically absent.  Left fallopian tube and ovary normal.  Cystoscopy normal with no evidence of bladder injury and good ureteral jet efflux bilaterally.    Condition: Stable    Specimens/Disposition: Uterus, cervix, left fallopian tube and ovary    Estimated Blood Loss: 500 mL  IV Fluids: 1400 mL crystalloid  Urine Output: 100 mL    Indications: Marizol Elkins, 31 y.o.,  with AUB-L complicated by iron deficiency anemia requiring IV iron infusions.  Patient failed medical therapy and desired definitive surgical management.    Description of Operation:  After appropriate consents were obtained, the patient was taken to the operating room.  The patient was identified and the procedure verified.  The patient was given general endotracheal  anesthesia and placed in the dorsal lithotomy position.  She was draped, prepped in the usual sterile manner. Time out was performed and procedure verified.  Mc catheter was placed.  A bi-valve speculum was placed into the vagina.  A single toothed tenaculum was used to grasp the anterior lip of the cervix.  A V-care uterine manipulator was placed through the cervix into the uterus.  Attention was then turned towards the abdomen.  A 5 mm supraumbilical incision was then made with scalpel.  A 5 mm trocar and sleeve was passed through the umbilical incision without difficulty under direct visualization.  Pneumoperitoneum was established; opening pressure was 7 mmHg.  The above findings were noted.  A 5 mm incision was made lateral to the umbilicus on the right side through which a 5 mm trocar and sleeve were passed through under direct visualization without difficulty.  A 5 mm incision was made lateral to the umbilicus on the left side, through which a 5 mm trocar and sleeve were passed through under direct visualization.  The left infundibulopelvic ligament and fallopian tube were grasped, coagulated, and transected.  Serial pedicles of coagulation and transection were performed to free the ovary and fallopian tube from surrounding tissue.  The pedicle was inspected and noted to be hemostatic; specimen was removed through the left laparoscopic port.  The Enseal was then used to clamp, cauterize, and cut the utero-ovarian ligament on the left.  The Enseal was used clamp, cauterize, and cut the round ligament and portions of the broad ligament.  Attention was then turned towards the right side of the uterus.  The Enseal was used to clamp, cauterize, and cut the right utero-ovarian ligament.  The Enseal was used to clamp, cauterize, and cut the right round ligament and portions of the right broad ligament.  Careful attention was given as the bladder flap was then created using blunt and sharp dissection.  Once the  bladder was completely dissected off the uterus, the Enseal was again used to clamp, cauterize, and cut the left uterine artery.  The left cardinal ligament was then clamped cauterized, and cut.  In like fashion on the right the uterine artery was skeletonized clamped, cauterized and cut.  The right cardinal ligament was clamped, cauterized, and cut.  The uterus and cervix were then amputated from the vagina using the bovie L-hook.  The vaginal cuff was closed with Endostitch.  Hemostasis was noted from the vaginal cuff.  Suction irrigation was then performed.  Looking laparoscopically, good hemostasis was noted at this time from the vaginal cuff.  Floseal was applied to the vaginal cuff.  The fascia of the left incision was closed using Mathew-Malena with 0-Vicryl.  TAP block was performed, injecting a total of 30 cc 0.5% ropivacaine bilaterally in the upper and lower quadrants.  The intraabdominal carbon dioxide was allowed to escape.  The side ports were removed under direct visualization and then the umbilical port was removed under direct visualization.  The skin incisions were closed with 3-0 Monocryl.  Cystoscopy was performed that showed no evidence of bladder injury and good ureteral jet effluxes bilaterally.  The vagina was inspected and found not to contain any instruments or sponges.  Vaginal cuff palpated intact.  Sponge, needle, and instrument counts were correct x2.  There were no intraoperative complications, and patient tolerated the procedure well.  She was extubated and escorted to the recovery room in stable condition.    The patient received IV gentamicin 5 mg/kg and IV clindamycin 900 mg for antibiotic prophylaxis prior to the start of the procedure.    ALTON Balderas was responsible for performing the following activities: Retraction, Suction, Suturing, Closing, Placing Dressing and Held/Positioned Camera and their skilled assistance was necessary for the success of this case.  Lore  CHELSEA Loco was responsible for performing the following activities: Suturing, Closing and Uterine manipulation and their skilled assistance was necessary for the success of this case.    This document has been electronically signed by Sia Sunshine MD on June 1, 2022 09:31 CDT.

## 2022-06-01 NOTE — ANESTHESIA PREPROCEDURE EVALUATION
Anesthesia Evaluation     history of anesthetic complications: PONV  NPO Solid Status: > 8 hours  NPO Liquid Status: > 8 hours           Airway   Mallampati: II  TM distance: >3 FB  Neck ROM: full  Possible difficult intubation and Large neck circumference  Dental - normal exam     Pulmonary - normal exam    breath sounds clear to auscultation  (+) a smoker (1 ppd) Current Smoked day of surgery, asthma,  (-) sleep apnea    ROS comment: snores  Cardiovascular - normal exam  Exercise tolerance: good (4-7 METS)    ECG reviewed  Rhythm: regular  Rate: normal    (-) hypertension, valvular problems/murmurs, dysrhythmias, angina, cardiac stents, DVT, hyperlipidemia    ROS comment: Normal sinus rhythm  Normal ECG  No previous ECGs available    · Left ventricular ejection fraction appears to be 66 - 70%. Left ventricular systolic function is normal.  · Left ventricular diastolic function was normal.  · Estimated right ventricular systolic pressure from tricuspid regurgitation is normal (<35 mmHg).        Neuro/Psych  (+) numbness (neuropathy in feet at times), psychiatric history (untreated due to allergies) Bipolar,    (-) seizures, TIA, CVA, headaches  GI/Hepatic/Renal/Endo    (+) morbid obesity, GERD well controlled,  renal disease stones,   (-) hepatitis, liver disease, diabetes    Musculoskeletal     (+) arthralgias, back pain, neck pain,   Abdominal   (+) obese,    Substance History   (-) alcohol use, drug use     OB/GYN    (-)  Pregnant        Other   arthritis,      (-) history of cancer  ROS/Med Hx Other: AUB                  Anesthesia Plan    ASA 3     general   (Zofran now for nausea and scop patch ordered)  intravenous induction     Anesthetic plan, all risks, benefits, and alternatives have been provided, discussed and informed consent has been obtained with: patient and spouse/significant other.  Use of blood products discussed with patient  Consented to blood products.       CODE STATUS:

## 2022-06-01 NOTE — ANESTHESIA PROCEDURE NOTES
Airway  Urgency: elective    Date/Time: 6/1/2022 7:19 AM  Airway not difficult    General Information and Staff    Patient location during procedure: OR  CRNA/CAA: Evelyn Rivero CRNA  SRNA: Bruce Gramajo SRNA  Indications and Patient Condition  Indications for airway management: airway protection    Preoxygenated: yes  MILS not maintained throughout  Mask difficulty assessment: 1 - vent by mask    Final Airway Details  Final airway type: endotracheal airway      Successful airway: ETT  Cuffed: yes   Successful intubation technique: video laryngoscopy  Facilitating devices/methods: intubating stylet  Endotracheal tube insertion site: oral  Blade: Vannessa  Blade size: 3  ETT size (mm): 7.5  Cormack-Lehane Classification: grade I - full view of glottis  Placement verified by: chest auscultation and capnometry   Measured from: lips  ETT/EBT  to lips (cm): 21  Number of attempts at approach: 1  Assessment: lips, teeth, and gum same as pre-op and atraumatic intubation

## 2022-06-01 NOTE — ANESTHESIA POSTPROCEDURE EVALUATION
Patient: Marizol Elkins    Procedure Summary     Date: 06/01/22 Room / Location: [facility] OR [address] [place] [address] OR    Anesthesia Start: 0708 Anesthesia Stop:     Procedure: TOTAL LAPAROSCOPIC HYSTERECTOMY, LEFT SALPINGO-OOPHORECTOMY, CYSTOSCOPY (N/A ) Diagnosis:       Abnormal uterine bleeding      (Abnormal uterine bleeding [N93.9])    Surgeons: Sia Sunshine MD Provider: Vaughn Hubbard MD    Anesthesia Type: general ASA Status: 3          Anesthesia Type: general    Vitals  No vitals data found for the desired time range.          Post Anesthesia Care and Evaluation    Patient location during evaluation: PACU  Patient participation: complete - patient cannot participate  Level of consciousness: responsive to physical stimuli  Pain score: 0  Pain management: adequate  Airway patency: patent  Anesthetic complications: No anesthetic complications  PONV Status: none  Cardiovascular status: acceptable  Respiratory status: acceptable, face mask, spontaneous ventilation and oral airway  Hydration status: acceptable    Comments:   /74  SPO2 99  RR 12

## 2022-06-02 ENCOUNTER — TELEPHONE (OUTPATIENT)
Dept: OBSTETRICS AND GYNECOLOGY | Facility: CLINIC | Age: 32
End: 2022-06-02

## 2022-06-02 NOTE — TELEPHONE ENCOUNTER
PT called regarding ibuprofen. She said cannot take it because it messes with her. She requested a returned call to the number on file to discuss what she can take instead.

## 2022-06-03 LAB — REF LAB TEST METHOD: NORMAL

## 2022-06-03 NOTE — TELEPHONE ENCOUNTER
"Returned patient call. States she cannot take ibuprofen because it \"hurts her bladder\".  Let her know I spoke with dr benito and we will send in diclofenac. Patient verbalized understanding and was agreeable, request medication to be sent in to [facility] pharmacy in [place].  "

## 2022-06-16 ENCOUNTER — OFFICE VISIT (OUTPATIENT)
Dept: OBSTETRICS AND GYNECOLOGY | Facility: CLINIC | Age: 32
End: 2022-06-16

## 2022-06-16 VITALS — BODY MASS INDEX: 44.12 KG/M2 | SYSTOLIC BLOOD PRESSURE: 126 MMHG | WEIGHT: 241.2 LBS | DIASTOLIC BLOOD PRESSURE: 78 MMHG

## 2022-06-16 DIAGNOSIS — N89.8 VAGINAL ITCHING: ICD-10-CM

## 2022-06-16 DIAGNOSIS — Z09 POSTOPERATIVE FOLLOW-UP: Primary | ICD-10-CM

## 2022-06-16 DIAGNOSIS — N93.9 ABNORMAL UTERINE BLEEDING: ICD-10-CM

## 2022-06-16 PROBLEM — N92.0 MENORRHAGIA WITH REGULAR CYCLE: Status: RESOLVED | Noted: 2021-03-08 | Resolved: 2022-06-16

## 2022-06-16 PROBLEM — Z90.710 STATUS POST HYSTERECTOMY WITH OOPHORECTOMY: Status: RESOLVED | Noted: 2022-06-01 | Resolved: 2022-06-16

## 2022-06-16 PROBLEM — Z90.721 STATUS POST HYSTERECTOMY WITH OOPHORECTOMY: Status: RESOLVED | Noted: 2022-06-01 | Resolved: 2022-06-16

## 2022-06-16 LAB
CANDIDA ALBICANS: NEGATIVE
GARDNERELLA VAGINALIS: NEGATIVE
T VAGINALIS DNA VAG QL PROBE+SIG AMP: NEGATIVE

## 2022-06-16 PROCEDURE — 99024 POSTOP FOLLOW-UP VISIT: CPT | Performed by: OBSTETRICS & GYNECOLOGY

## 2022-06-16 PROCEDURE — 87660 TRICHOMONAS VAGIN DIR PROBE: CPT | Performed by: OBSTETRICS & GYNECOLOGY

## 2022-06-16 PROCEDURE — 87510 GARDNER VAG DNA DIR PROBE: CPT | Performed by: OBSTETRICS & GYNECOLOGY

## 2022-06-16 PROCEDURE — 87480 CANDIDA DNA DIR PROBE: CPT | Performed by: OBSTETRICS & GYNECOLOGY

## 2022-06-16 NOTE — PROGRESS NOTES
[facility]  Gynecology  Date of Service: 2022    CC: Post-operative Visit    DOS: 2022  Pre-op diagnosis: AUB, iron deficiency anemia  Procedure: Total laparoscopic hysterectomy, left salpingo-oophorectomy, cystoscopy  Pathology:   UTERUS AND CERVIX, LEFT FALLOPIAN TUBE AND LEFT OVARY:   Chronic cervicitis with nabothian cysts   Disordered proliferative phase endometrium   Benign subserosal and intramural leiomyomas (4.5 cm)   Benign left ovarian serous cystadenofibroma   Unremarkable fallopian tube   Negative for dysplasia, hyperplasia or malignancy      Patient presents for post-op visit.  She states she still has a lump at her incision.  She has had some vaginal itching.  She has had occasional bleeding.  Has some hot flashes, mood swings but manageable.    /78 (BP Location: Left arm, Patient Position: Sitting, Cuff Size: Adult)   Wt 109 kg (241 lb 3.2 oz)   LMP 2022   BMI 44.12 kg/m²   Gen: NAD, AAO x3  Abd: Soft, NTND, incisions c/d/i covered w/ surgical glue.  No mass palpated at recent incision.  2 cm incisional hernia palpated at prior laparoscopic incision.    A/P: Marizol Elkins is a 31 y.o.  s/p TLH/LSO, cystoscopy on .  Doing well.  - Reviewed pathology  - Continue lifting restrictions, pelvic rest  - Vag panel today  - Will re-evaluate abdomen at 6 wk post-op visit; if bulge still present, will obtain CT scan and then place General Surgery referral  - Discussed HRT; patient declines at this time.  Reviewed R/B/A of HRT.  - RTC in 4 weeks    This document has been electronically signed by Sia Sunshine MD on 2022 15:02 CDT.

## 2022-06-20 DIAGNOSIS — M54.2 CHRONIC MIDLINE POSTERIOR NECK PAIN: ICD-10-CM

## 2022-06-20 DIAGNOSIS — M54.42 CHRONIC BILATERAL LOW BACK PAIN WITH BILATERAL SCIATICA: ICD-10-CM

## 2022-06-20 DIAGNOSIS — G89.29 CHRONIC MIDLINE POSTERIOR NECK PAIN: ICD-10-CM

## 2022-06-20 DIAGNOSIS — M54.41 CHRONIC BILATERAL LOW BACK PAIN WITH BILATERAL SCIATICA: ICD-10-CM

## 2022-06-20 DIAGNOSIS — G89.29 CHRONIC BILATERAL LOW BACK PAIN WITH BILATERAL SCIATICA: ICD-10-CM

## 2022-06-21 ENCOUNTER — TELEPHONE (OUTPATIENT)
Dept: FAMILY MEDICINE CLINIC | Facility: CLINIC | Age: 32
End: 2022-06-21

## 2022-06-21 RX ORDER — HYDROCODONE BITARTRATE AND ACETAMINOPHEN 7.5; 325 MG/1; MG/1
1 TABLET ORAL 2 TIMES DAILY PRN
Qty: 45 TABLET | Refills: 0 | Status: SHIPPED | OUTPATIENT
Start: 2022-06-21 | End: 2022-07-14

## 2022-06-21 NOTE — TELEPHONE ENCOUNTER
Patient seen in office every 3 months for chronic pain requiring opiate pain medication. Compliant with medication, visits with no adverse effects noted. FILEMON and UDS current and appropriate. Patient called requesting scheduled refill at appropriate interval. Patient understands the risks associated with this controlled medication, including tolerance and addiction.  Patient also agrees to only obtain this medication from me, and not from a another provider, unless that provider is covering for me in my absence.  Patient also agrees to be compliant in dosing, and not self adjust the dose of medication.  A signed controlled substance agreement is on file, and the patient has received a controlled substance education sheet at this a previous visit.  The patient has also signed a consent for treatment with a controlled substance as per [facility] policy. FILEMON was obtained.   Refill sent for hydrocodone.     This document has been electronically signed by GIL Darling on June 21, 2022 10:42 CDT

## 2022-07-14 ENCOUNTER — OFFICE VISIT (OUTPATIENT)
Dept: OBSTETRICS AND GYNECOLOGY | Facility: CLINIC | Age: 32
End: 2022-07-14

## 2022-07-14 VITALS
WEIGHT: 243 LBS | BODY MASS INDEX: 44.72 KG/M2 | SYSTOLIC BLOOD PRESSURE: 128 MMHG | HEIGHT: 62 IN | DIASTOLIC BLOOD PRESSURE: 74 MMHG

## 2022-07-14 DIAGNOSIS — Z98.890 POSTOPERATIVE STATE: Primary | ICD-10-CM

## 2022-07-14 PROCEDURE — 99024 POSTOP FOLLOW-UP VISIT: CPT | Performed by: OBSTETRICS & GYNECOLOGY

## 2022-07-15 NOTE — PROGRESS NOTES
Marizol Elkins is a 31 y.o. y/o female.     Chief Complaint: Postop hysterectomy    HPI:   31 y.o. .  Patient's last menstrual period was 2022..  She is doing well normal bowel and bladder function.  Has not had any vaginal bleeding          Review of Systems     Constitutional: Denies night sweats    HENT: No hearing changes, denies ear pain    Eye: No eye pain; no foreign body in eye    Pulmonary: No hemoptysis    Cardiovascular: No claudication    GI: No hematemesis    Musculoskeletal: No arthralgias, no joint swelling    Endocrine: No polydipsia or polyuria    Hematologic: Denies any free bleeding    Psychiatric: Denies any delusions    The following portions of the patient's history were reviewed and updated as appropriate: allergies, current medications, past family history, past medical history, past social history, past surgical history and problem list.    Allergies   Allergen Reactions   • Penicillins Anaphylaxis   • Tramadol Anaphylaxis   • Fluoxetine Headache   • Latex Swelling and Hives   • Morphine Nausea And Vomiting   • Other Hives and Swelling     jalepeno   • Bupropion Other (See Comments)     Makes her feel bad   • Celexa [Citalopram Hydrobromide] Anxiety   • Codeine Rash   • Cymbalta [Duloxetine Hcl] Anxiety   • Effexor [Venlafaxine] Anxiety   • Geodon [Ziprasidone Hcl] Anxiety   • Lamictal [Lamotrigine] Anxiety   • Latuda [Lurasidone Hcl] Anxiety   • Paxil [Paroxetine Hcl] Anxiety   • Viibryd [Vilazodone Hcl] Anxiety   • Zoloft [Sertraline Hcl] Anxiety        Outpatient Medications Prior to Visit   Medication Sig Dispense Refill   • acetaminophen (TYLENOL) 500 MG tablet Take 2 tablets by mouth Every 6 (Six) Hours As Needed for Mild Pain  or Moderate Pain . 30 tablet 1   • docusate sodium (COLACE) 250 MG capsule Take 1 capsule by mouth 2 (Two) Times a Day. 60 capsule 1   • famotidine (PEPCID) 40 MG tablet Take 1 tablet by mouth Daily. 90 tablet 1   • ibuprofen (ADVIL,MOTRIN) 800 MG  tablet Take 1 tablet by mouth Every 8 (Eight) Hours As Needed for Mild Pain  or Moderate Pain . 30 tablet 1   • naproxen (Naprosyn) 500 MG tablet Take 1 tablet by mouth 2 (Two) Times a Day As Needed for Mild Pain  (back pain, menstrual pain). 60 tablet 5   • ondansetron (ZOFRAN) 4 MG tablet Take 1 tablet by mouth Every 8 (Eight) Hours As Needed for Nausea or Vomiting. 60 tablet 3   • pantoprazole (PROTONIX) 40 MG EC tablet Take 1 tablet by mouth Daily. For acid reflux 90 tablet 1   • phentermine (ADIPEX-P) 37.5 MG tablet Take 1 tablet by mouth Every Morning Before Breakfast. 30 tablet 0   • Ventolin  (90 Base) MCG/ACT inhaler Inhale 2 puffs Every 6 (Six) Hours As Needed for Wheezing or Shortness of Air. Ventolin name brand only. 18 g 11   • HYDROcodone-acetaminophen (NORCO) 7.5-325 MG per tablet Take 1 tablet by mouth 2 (Two) Times a Day As Needed for Moderate Pain . For chronic low back pain M.54.41, M54.42 45 tablet 0     No facility-administered medications prior to visit.        The patient has a family history of   Family History   Problem Relation Age of Onset   • Ovarian cancer Mother    • Diabetes Father    • Heart disease Father    • Hypertension Maternal Grandmother    • Breast cancer Paternal Grandmother    • Colon cancer Neg Hx    • Endometrial cancer Neg Hx    • Ovarian cancer Neg Hx         Past Medical History:   Diagnosis Date   • Anxiety    • Arthritis    • Asthma    • Bipolar disease, chronic (HCC)    • Endometriosis    • GERD (gastroesophageal reflux disease)    • Gonorrhea    • Kidney stone    • Mild depression    • Polycystic ovary syndrome    • PONV (postoperative nausea and vomiting)    • Prediabetes         OB History        3    Para   1    Term   1            AB   2    Living   1       SAB   2    IAB        Ectopic        Molar        Multiple        Live Births   1                 Social History     Socioeconomic History   • Marital status:    Tobacco Use  "  • Smoking status: Current Every Day Smoker     Packs/day: 1.00     Years: 10.00     Pack years: 10.00   • Smokeless tobacco: Never Used   Vaping Use   • Vaping Use: Never used   Substance and Sexual Activity   • Alcohol use: No   • Drug use: No   • Sexual activity: Defer        Past Surgical History:   Procedure Laterality Date   •  SECTION     • SALPINGO OOPHORECTOMY Right 2010    Large ovarian cyst remotved at time of  removed   • TONSILLECTOMY AND ADENOIDECTOMY     • TOTAL LAPAROSCOPIC HYSTERECTOMY SALPINGECTOMY N/A 2022    Procedure: TOTAL LAPAROSCOPIC HYSTERECTOMY, LEFT SALPINGO-OOPHORECTOMY, CYSTOSCOPY;  Surgeon: Sia Sunshine MD;  Location: [facility];  Service: Gynecology;  Laterality: N/A;   • WISDOM TOOTH EXTRACTION          Patient Active Problem List   Diagnosis   • Iron deficiency anemia   • Iron malabsorption   • Morbidly obese (HCC)   • Abnormal uterine bleeding        Documented Vitals    22 1523   BP: 128/74   Weight: 110 kg (243 lb)   Height: 157.5 cm (62\")        Body mass index is 44.45 kg/m².    Physical Exam  Constitutional: Appears to be in no acute distress; Eyes: Sclerae normal; Endocrine system: Thyroid palpation is normal; Pulmonary system: Lungs clear; Cardiovascular system: Heart regular rate and rhythm; Gastrointestinal system: Abdomen soft and nontender, active bowel sounds; Urologic system: CVA negative; Psychiatric: Appropriate insight; Neurologic: Gait within normal limits trocar sites well-healed    Laboratory Data:   Lab Results - Last 18 Months   Lab Units 22  0811 22  1105 21  1400 21  0845   GLUCOSE mg/dL 96 118* 106* 133*   BUN mg/dL 9 8 8 12   CREATININE mg/dL 0.72 0.64 0.80 0.69   SODIUM mmol/L 137 137 136 137   POTASSIUM mmol/L 3.9 3.2* 3.6 3.5   CHLORIDE mmol/L 102 104 101 104   CO2 mmol/L 23.0 26.0 22.0 24.0   CALCIUM mg/dL 9.3 8.7 8.5* 8.7   TOTAL PROTEIN g/dL  --  6.8 6.2 6.9   ALBUMIN g/dL  --  " 3.70 3.80 3.80   ALT (SGPT) U/L  --  12 7 14   AST (SGOT) U/L  --  19 10 20   ALK PHOS U/L  --  66 60 56   BILIRUBIN mg/dL  --  0.3 0.2 0.3   EGFR IF NONAFRICN AM mL/min/1.73  --   --  83 99   GLOBULIN gm/dL  --  3.1 2.4 3.1   A/G RATIO g/dL  --  1.2 1.6 1.2   BUN / CREAT RATIO  12.5 12.5 10.0 17.4   ANION GAP mmol/L 12.0 7.0 13.0 9.0     Lab Results - Last 18 Months   Lab Units 05/31/22  0811 03/22/22  1105 07/19/21  1400 05/03/21  1317 03/08/21  1511   WBC 10*3/mm3 7.78 10.63 9.93 9.87 9.06   RBC 10*6/mm3 4.77 4.61 4.53 4.98 4.81   HEMOGLOBIN g/dL 13.4 12.2 14.1 13.6 10.8*   HEMATOCRIT % 39.2 37.6 40.5 40.7 34.9   MCV fL 82.2 81.6 89.4 81.7 72.6*   MCH pg 28.1 26.5* 31.1 27.3 22.5*   MCHC g/dL 34.2 32.4 34.8 33.4 30.9*   RDW % 15.4 15.2 13.8 21.8* 15.9*   RDW-SD fl 46.5 44.5 43.7 61.8* 41.3   MPV fL 9.4 9.1 9.5 9.2 9.6   PLATELETS 10*3/mm3 302 298 260 283 350     No results for input(s): HCGQUAL in the last 93932 hours.    Assessment        Diagnosis Plan   1. Postoperative state   postop doing well discussed activity limitations         Plan       No orders of the defined types were placed in this encounter.            This document has been electronically signed by Piotr Broussard MD on July 14, 2022 20:57 CDT    Please note that portions of this note were completed with a voice recognition program.

## 2022-08-02 DIAGNOSIS — G89.29 CHRONIC BILATERAL LOW BACK PAIN WITH BILATERAL SCIATICA: Primary | ICD-10-CM

## 2022-08-02 DIAGNOSIS — M54.42 CHRONIC BILATERAL LOW BACK PAIN WITH BILATERAL SCIATICA: Primary | ICD-10-CM

## 2022-08-02 DIAGNOSIS — M54.41 CHRONIC BILATERAL LOW BACK PAIN WITH BILATERAL SCIATICA: Primary | ICD-10-CM

## 2022-08-02 RX ORDER — HYDROCODONE BITARTRATE AND ACETAMINOPHEN 7.5; 325 MG/1; MG/1
1 TABLET ORAL EVERY 6 HOURS PRN
Qty: 45 TABLET | Refills: 0 | Status: SHIPPED | OUTPATIENT
Start: 2022-08-02 | End: 2022-10-27

## 2022-08-02 NOTE — TELEPHONE ENCOUNTER
Incoming Refill Request      Medication requested (name and dose):   HYDROcodone-acetaminophen (NORCO) 7.5-325 MG per tablet       Pharmacy where request should be sent:  [facility]    Additional details provided by patient:   Gordon//has appt with Pearl Womack 8/11/22    Best call back number:     Does the patient have less than a 3 day supply:  [] Yes  [] No    Kristi James  08/02/22, 09:49 CDT

## 2022-08-03 ENCOUNTER — PRIOR AUTHORIZATION (OUTPATIENT)
Dept: FAMILY MEDICINE CLINIC | Facility: CLINIC | Age: 32
End: 2022-08-03

## 2022-08-03 NOTE — TELEPHONE ENCOUNTER
MARIA T WATKINS (Fierro: [id number]) - [id number]  HYDROcodone-Acetaminophen7.5-325MG tablets     Status: PA Response - Approved  Created: August 2nd, 2022

## 2022-08-08 ENCOUNTER — OFFICE VISIT (OUTPATIENT)
Dept: OBSTETRICS AND GYNECOLOGY | Facility: CLINIC | Age: 32
End: 2022-08-08

## 2022-08-08 VITALS — WEIGHT: 245.4 LBS | DIASTOLIC BLOOD PRESSURE: 72 MMHG | SYSTOLIC BLOOD PRESSURE: 126 MMHG | BODY MASS INDEX: 44.88 KG/M2

## 2022-08-08 DIAGNOSIS — N93.9 ABNORMAL UTERINE BLEEDING: ICD-10-CM

## 2022-08-08 DIAGNOSIS — Z09 POSTOPERATIVE FOLLOW-UP: Primary | ICD-10-CM

## 2022-08-08 PROBLEM — D50.9 IRON DEFICIENCY ANEMIA: Status: RESOLVED | Noted: 2021-03-08 | Resolved: 2022-08-08

## 2022-08-08 PROBLEM — K90.9 IRON MALABSORPTION: Status: RESOLVED | Noted: 2021-03-08 | Resolved: 2022-08-08

## 2022-08-08 PROCEDURE — 99024 POSTOP FOLLOW-UP VISIT: CPT | Performed by: OBSTETRICS & GYNECOLOGY

## 2022-08-08 NOTE — PROGRESS NOTES
[facility]  Gynecology  Date of Service: 2022     CC: Post-operative Visit    DOS: 2022  Pre-op diagnosis: AUB, iron deficiency anemia  Procedure: Total laparoscopic hysterectomy, left salpingo-oophorectomy, cystoscopy  Pathology:   UTERUS AND CERVIX, LEFT FALLOPIAN TUBE AND LEFT OVARY:   Chronic cervicitis with nabothian cysts   Disordered proliferative phase endometrium   Benign subserosal and intramural leiomyomas (4.5 cm)   Benign left ovarian serous cystadenofibroma   Unremarkable fallopian tube   Negative for dysplasia, hyperplasia or malignancy      Patient presents for post-op visit.  No concerns.  She states that her  tried to have sex with her but it was too painful so they stopped.  She states that she has some hot flashes, night sweats, and mood swings but does not want to do HRT due to risk of breast cancer. She is not able to tolerate many SSRIs.    /72 (BP Location: Left arm, Patient Position: Sitting, Cuff Size: Adult)   Wt 111 kg (245 lb 6.4 oz)   LMP 2022   BMI 44.88 kg/m²   Gen: NAD, AAO x3  Abd: Soft, NTND, incisions c/d/i.  : External genitalia WNL.  Vaginal cuff visually intact, palpates intact.    A/P: Marizol Elkins is a 31 y.o.  s/p TLH/LSO, cystoscopy on .  Doing well.  - Reviewed pathology  - Restrictions lifted  - Discussed HRT; patient declines at this time.  Reviewed R/B/A of HRT.  - RTC in 1 year for annual visit or PRN per patient preference    This document has been electronically signed by Sia Sunshine MD on 2022 15:19 CDT.

## 2022-08-16 ENCOUNTER — OFFICE VISIT (OUTPATIENT)
Dept: FAMILY MEDICINE CLINIC | Facility: CLINIC | Age: 32
End: 2022-08-16

## 2022-08-16 VITALS
BODY MASS INDEX: 45.3 KG/M2 | HEIGHT: 62 IN | OXYGEN SATURATION: 99 % | WEIGHT: 246.2 LBS | HEART RATE: 98 BPM | DIASTOLIC BLOOD PRESSURE: 78 MMHG | RESPIRATION RATE: 18 BRPM | SYSTOLIC BLOOD PRESSURE: 134 MMHG | TEMPERATURE: 97.2 F

## 2022-08-16 DIAGNOSIS — K62.5 RECTAL BLEEDING: Primary | ICD-10-CM

## 2022-08-16 PROCEDURE — 99214 OFFICE O/P EST MOD 30 MIN: CPT | Performed by: PHYSICIAN ASSISTANT

## 2022-08-16 RX ORDER — HYDROCORTISONE 25 MG/G
CREAM TOPICAL 2 TIMES DAILY
Qty: 28 G | Refills: 0 | Status: SHIPPED | OUTPATIENT
Start: 2022-08-16

## 2022-08-16 NOTE — PROGRESS NOTES
Subjective   Marizol Elkins is a 31 y.o. female.     History of Present Illness     Pt presents today with a c/c of rectal bleeding with bowel movements x1 week. She reports noticing BRBPR when wiping after bowel movements, <1 tsp. She reports bowel movements occurring qd described as firm, hard bowel movements. Does report straining with bowel movements. She does admit to recent hysterectomy with oophorectomy on 6/1/22. She admits to generalized abdominal cramping prior to bowel movements. Denies abdominal pain at present. Last bowel movement x today. She denies n/v/d, dizziness/lightheadedness, syncope/presyncope, chest pain, dyspnea. Denies urinary symptoms.    The following portions of the patient's history were reviewed and updated as appropriate: allergies, current medications, past family history, past medical history, past social history, past surgical history and problem list.    Review of Systems   Constitutional: Negative.  Negative for activity change, appetite change, chills, fatigue and fever.   HENT: Negative.  Negative for congestion.    Eyes: Negative.  Negative for blurred vision, double vision, photophobia and visual disturbance.   Respiratory: Negative.  Negative for cough, chest tightness, shortness of breath and wheezing.    Cardiovascular: Negative.  Negative for chest pain, palpitations and leg swelling.   Gastrointestinal: Positive for blood in stool, constipation and rectal pain. Negative for abdominal distention, abdominal pain, diarrhea, nausea, vomiting, GERD and indigestion.   Endocrine: Negative.  Negative for cold intolerance and heat intolerance.   Genitourinary: Negative.  Negative for dysuria, flank pain, frequency and urinary incontinence.   Musculoskeletal: Negative.  Negative for arthralgias and back pain.   Skin: Negative.    Allergic/Immunologic: Negative.  Negative for immunocompromised state.   Neurological: Negative.  Negative for dizziness and light-headedness.    Hematological: Negative.    Psychiatric/Behavioral: Negative.  Negative for agitation, behavioral problems, decreased concentration, dysphoric mood, hallucinations, self-injury, sleep disturbance, suicidal ideas, negative for hyperactivity, depressed mood and stress. The patient is not nervous/anxious.    All other systems reviewed and are negative.      Objective   Physical Exam  Vitals and nursing note reviewed.   Constitutional:       General: She is not in acute distress.     Appearance: Normal appearance. She is well-developed. She is obese. She is not ill-appearing or diaphoretic.   HENT:      Head: Normocephalic and atraumatic.      Right Ear: External ear normal.      Left Ear: External ear normal.   Eyes:      Conjunctiva/sclera: Conjunctivae normal.      Pupils: Pupils are equal, round, and reactive to light.   Neck:      Thyroid: No thyromegaly.      Vascular: No carotid bruit or JVD.      Trachea: No tracheal deviation.   Cardiovascular:      Rate and Rhythm: Normal rate and regular rhythm.      Pulses: Normal pulses.      Heart sounds: Normal heart sounds. No murmur heard.    No friction rub. No gallop.   Pulmonary:      Effort: Pulmonary effort is normal. No respiratory distress.      Breath sounds: Normal breath sounds. No stridor. No wheezing, rhonchi or rales.   Chest:      Chest wall: No tenderness.   Abdominal:      General: Bowel sounds are normal. There is no distension.      Palpations: Abdomen is soft.      Tenderness: There is no abdominal tenderness. There is no guarding or rebound. Negative signs include Mishra's sign, Rovsing's sign, McBurney's sign, psoas sign and obturator sign.      Comments: Abdomen soft, nontender. BS active x 4 quadrants. Negative peritoneal signs.   Genitourinary:     Comments: Rectal exam deferred by patient.  Musculoskeletal:         General: No tenderness or deformity. Normal range of motion.      Cervical back: Normal range of motion and neck supple. No  rigidity or tenderness.      Right lower leg: No edema.      Left lower leg: No edema.   Lymphadenopathy:      Cervical: No cervical adenopathy.   Skin:     General: Skin is warm and dry.      Capillary Refill: Capillary refill takes less than 2 seconds.      Coloration: Skin is not jaundiced or pale.      Findings: No bruising, erythema, lesion or rash.   Neurological:      General: No focal deficit present.      Mental Status: She is alert and oriented to person, place, and time. Mental status is at baseline.      Motor: No weakness.      Gait: Gait normal.   Psychiatric:         Mood and Affect: Mood normal.         Behavior: Behavior normal.         Thought Content: Thought content normal.         Judgment: Judgment normal.       Vitals:    08/16/22 1541   BP: 134/78   Pulse: 98   Resp: 18   Temp: 97.2 °F (36.2 °C)   SpO2: 99%        Assessment & Plan   Diagnoses and all orders for this visit:    1. Rectal bleeding (Primary)  -     CBC w AUTO Differential; Future  -     Comprehensive metabolic panel; Future  -     Amylase; Future  -     Lipase; Future  -     Urinalysis With Culture If Indicated -; Future  -     CT Abdomen Pelvis With Contrast; Future  -     Ambulatory Referral to Gastroenterology    Other orders  -     Hydrocortisone, Perianal, (Anusol-HC) 2.5 % rectal cream; Insert  into the rectum 2 (Two) Times a Day.  Dispense: 28 g; Refill: 0  -     docusate sodium (Colace) 100 MG capsule; Take 1 capsule by mouth 2 (Two) Times a Day As Needed for Constipation.  Dispense: 60 capsule; Refill: 2      Rectal bleeding - new. She is in NAD, afebrile, abdominal exam is benign. I recommended labs as above for further evaluation and assessment, unfortunately lab has left for the day and she is unable to complete labs at this facility today. I have offered to send her labs to [facility], she declined. She prefers to return tomorrow to have labs completed, which is reasonable, but I have advised patient if her symptoms are  to worsen/persist she is to immediately present to ER for further evaluation and treatment and she verbalized understanding. As she has recently had hysterectomy with oophorectomy, I will obtain a ct abdomen pelvis with contrast as above for further evaluation and assessment of rectal bleeding. In regards to her constipation and straining with bowel movements I will begin her on docusate 100mg BID PRN for constipation. I have advised her to increase dietary fiber and increase fluid intake. I will also prescribe her anusol as she is concerned for potential hemorrhoid but she declined rectal exam. I will also refer her to GI for further evaluation and assessment, consideration of egd/csy due to rectal bleeding. In office we discussed concerning s/s to immediately present to ER for further evaluation and assessment including but not limited to abdominal pain, refractory n/v, syncope/presyncope, chest pain, dyspnea, dizziness, ams/confusion, worsening/persistent symptoms. Pt verbalized understanding. Follow up in 1 week for recheck after obtaining the above labs and diagnostic testing.     Patient educated to follow up in 1 week or sooner than next scheduled appointment if symptoms worsen or do not improve. Patient stated understanding and has agreed with plan of care. After visit summary was printed and given to patient.      This document has been electronically signed by Pearl Womack PA-C on August 24, 2022 14:07 CDT,.

## 2022-08-23 DIAGNOSIS — M54.41 CHRONIC BILATERAL LOW BACK PAIN WITH BILATERAL SCIATICA: ICD-10-CM

## 2022-08-23 DIAGNOSIS — E66.01 MORBID (SEVERE) OBESITY DUE TO EXCESS CALORIES: ICD-10-CM

## 2022-08-23 DIAGNOSIS — R11.0 NAUSEA: ICD-10-CM

## 2022-08-23 DIAGNOSIS — K21.9 GASTROESOPHAGEAL REFLUX DISEASE: ICD-10-CM

## 2022-08-23 DIAGNOSIS — G89.29 CHRONIC BILATERAL LOW BACK PAIN WITH BILATERAL SCIATICA: ICD-10-CM

## 2022-08-23 DIAGNOSIS — G89.29 CHRONIC MIDLINE POSTERIOR NECK PAIN: ICD-10-CM

## 2022-08-23 DIAGNOSIS — M54.2 CHRONIC MIDLINE POSTERIOR NECK PAIN: ICD-10-CM

## 2022-08-23 DIAGNOSIS — J45.20 MILD INTERMITTENT ASTHMA WITHOUT COMPLICATION: ICD-10-CM

## 2022-08-23 DIAGNOSIS — M54.42 CHRONIC BILATERAL LOW BACK PAIN WITH BILATERAL SCIATICA: ICD-10-CM

## 2022-08-23 RX ORDER — PHENTERMINE HYDROCHLORIDE 37.5 MG/1
37.5 TABLET ORAL
Qty: 30 TABLET | Refills: 0 | Status: CANCELLED | OUTPATIENT
Start: 2022-08-23

## 2022-08-24 RX ORDER — ALBUTEROL SULFATE 90 UG/1
2 AEROSOL, METERED RESPIRATORY (INHALATION) EVERY 6 HOURS PRN
Qty: 18 G | Refills: 11 | Status: SHIPPED | OUTPATIENT
Start: 2022-08-24 | End: 2022-09-12 | Stop reason: SDUPTHER

## 2022-08-24 RX ORDER — ONDANSETRON 4 MG/1
4 TABLET, FILM COATED ORAL EVERY 8 HOURS PRN
Qty: 60 TABLET | Refills: 3 | Status: SHIPPED | OUTPATIENT
Start: 2022-08-24 | End: 2022-09-12 | Stop reason: SDUPTHER

## 2022-08-24 RX ORDER — DOCUSATE SODIUM 100 MG/1
100 CAPSULE, LIQUID FILLED ORAL 2 TIMES DAILY PRN
Qty: 60 CAPSULE | Refills: 2 | Status: SHIPPED | OUTPATIENT
Start: 2022-08-24 | End: 2022-09-12 | Stop reason: SDUPTHER

## 2022-08-24 RX ORDER — NAPROXEN 500 MG/1
500 TABLET ORAL 2 TIMES DAILY PRN
Qty: 60 TABLET | Refills: 5 | Status: SHIPPED | OUTPATIENT
Start: 2022-08-24 | End: 2022-11-02

## 2022-08-25 RX ORDER — FAMOTIDINE 40 MG/1
40 TABLET, FILM COATED ORAL DAILY
Qty: 90 TABLET | Refills: 1 | Status: SHIPPED | OUTPATIENT
Start: 2022-08-25 | End: 2022-09-12 | Stop reason: SDUPTHER

## 2022-08-25 RX ORDER — PANTOPRAZOLE SODIUM 40 MG/1
40 TABLET, DELAYED RELEASE ORAL DAILY
Qty: 90 TABLET | Refills: 1 | Status: SHIPPED | OUTPATIENT
Start: 2022-08-25 | End: 2022-09-12 | Stop reason: SDUPTHER

## 2022-09-12 DIAGNOSIS — M54.41 CHRONIC BILATERAL LOW BACK PAIN WITH BILATERAL SCIATICA: ICD-10-CM

## 2022-09-12 DIAGNOSIS — R11.0 NAUSEA: ICD-10-CM

## 2022-09-12 DIAGNOSIS — M54.42 CHRONIC BILATERAL LOW BACK PAIN WITH BILATERAL SCIATICA: ICD-10-CM

## 2022-09-12 DIAGNOSIS — G89.29 CHRONIC BILATERAL LOW BACK PAIN WITH BILATERAL SCIATICA: ICD-10-CM

## 2022-09-12 DIAGNOSIS — K21.9 GASTROESOPHAGEAL REFLUX DISEASE: ICD-10-CM

## 2022-09-12 DIAGNOSIS — J45.20 MILD INTERMITTENT ASTHMA WITHOUT COMPLICATION: ICD-10-CM

## 2022-09-12 RX ORDER — HYDROCODONE BITARTRATE AND ACETAMINOPHEN 7.5; 325 MG/1; MG/1
1 TABLET ORAL EVERY 6 HOURS PRN
Qty: 45 TABLET | Refills: 0 | Status: CANCELLED | OUTPATIENT
Start: 2022-09-12

## 2022-09-15 RX ORDER — ALBUTEROL SULFATE 90 UG/1
2 AEROSOL, METERED RESPIRATORY (INHALATION) EVERY 6 HOURS PRN
Qty: 18 G | Refills: 0 | Status: SHIPPED | OUTPATIENT
Start: 2022-09-15 | End: 2022-11-02

## 2022-09-15 RX ORDER — DOCUSATE SODIUM 100 MG/1
100 CAPSULE, LIQUID FILLED ORAL 2 TIMES DAILY PRN
Qty: 60 CAPSULE | Refills: 0 | Status: SHIPPED | OUTPATIENT
Start: 2022-09-15

## 2022-09-15 RX ORDER — ONDANSETRON 4 MG/1
4 TABLET, FILM COATED ORAL EVERY 8 HOURS PRN
Qty: 60 TABLET | Refills: 0 | Status: SHIPPED | OUTPATIENT
Start: 2022-09-15 | End: 2022-11-07 | Stop reason: SDUPTHER

## 2022-09-15 RX ORDER — FAMOTIDINE 40 MG/1
40 TABLET, FILM COATED ORAL DAILY
Qty: 90 TABLET | Refills: 0 | Status: SHIPPED | OUTPATIENT
Start: 2022-09-15 | End: 2022-11-07 | Stop reason: SDUPTHER

## 2022-09-15 RX ORDER — PANTOPRAZOLE SODIUM 40 MG/1
40 TABLET, DELAYED RELEASE ORAL DAILY
Qty: 90 TABLET | Refills: 1 | Status: SHIPPED | OUTPATIENT
Start: 2022-09-15 | End: 2022-11-07 | Stop reason: SDUPTHER

## 2022-10-19 ENCOUNTER — LAB (OUTPATIENT)
Dept: LAB | Facility: OTHER | Age: 32
End: 2022-10-19

## 2022-10-19 DIAGNOSIS — K62.5 RECTAL BLEEDING: ICD-10-CM

## 2022-10-19 LAB
ALBUMIN SERPL-MCNC: 4.1 G/DL (ref 3.5–5)
ALBUMIN/GLOB SERPL: 1.5 G/DL (ref 1.1–1.8)
ALP SERPL-CCNC: 71 U/L (ref 38–126)
ALT SERPL W P-5'-P-CCNC: 18 U/L
AMYLASE SERPL-CCNC: 72 U/L (ref 30–110)
ANION GAP SERPL CALCULATED.3IONS-SCNC: 5 MMOL/L (ref 5–15)
AST SERPL-CCNC: 19 U/L (ref 14–36)
BASOPHILS # BLD AUTO: 0.05 10*3/MM3 (ref 0–0.2)
BASOPHILS NFR BLD AUTO: 0.6 % (ref 0–1.5)
BILIRUB SERPL-MCNC: 0.3 MG/DL (ref 0.2–1.3)
BUN SERPL-MCNC: 9 MG/DL (ref 7–23)
BUN/CREAT SERPL: 11.4 (ref 7–25)
CALCIUM SPEC-SCNC: 9 MG/DL (ref 8.4–10.2)
CHLORIDE SERPL-SCNC: 104 MMOL/L (ref 101–112)
CO2 SERPL-SCNC: 26 MMOL/L (ref 22–30)
CREAT SERPL-MCNC: 0.79 MG/DL (ref 0.52–1.04)
DEPRECATED RDW RBC AUTO: 44.2 FL (ref 37–54)
EGFRCR SERPLBLD CKD-EPI 2021: 101.4 ML/MIN/1.73
EOSINOPHIL # BLD AUTO: 0.26 10*3/MM3 (ref 0–0.4)
EOSINOPHIL NFR BLD AUTO: 3.2 % (ref 0.3–6.2)
ERYTHROCYTE [DISTWIDTH] IN BLOOD BY AUTOMATED COUNT: 15.5 % (ref 12.3–15.4)
GLOBULIN UR ELPH-MCNC: 2.7 GM/DL (ref 2.3–3.5)
GLUCOSE SERPL-MCNC: 113 MG/DL (ref 70–99)
HCT VFR BLD AUTO: 42.1 % (ref 34–46.6)
HGB BLD-MCNC: 13.6 G/DL (ref 12–15.9)
LYMPHOCYTES # BLD AUTO: 2.93 10*3/MM3 (ref 0.7–3.1)
LYMPHOCYTES NFR BLD AUTO: 35.8 % (ref 19.6–45.3)
MCH RBC QN AUTO: 26 PG (ref 26.6–33)
MCHC RBC AUTO-ENTMCNC: 32.3 G/DL (ref 31.5–35.7)
MCV RBC AUTO: 80.3 FL (ref 79–97)
MONOCYTES # BLD AUTO: 0.61 10*3/MM3 (ref 0.1–0.9)
MONOCYTES NFR BLD AUTO: 7.4 % (ref 5–12)
NEUTROPHILS NFR BLD AUTO: 4.34 10*3/MM3 (ref 1.7–7)
NEUTROPHILS NFR BLD AUTO: 53 % (ref 42.7–76)
PLATELET # BLD AUTO: 274 10*3/MM3 (ref 140–450)
PMV BLD AUTO: 9.5 FL (ref 6–12)
POTASSIUM SERPL-SCNC: 3.8 MMOL/L (ref 3.4–5)
PROT SERPL-MCNC: 6.8 G/DL (ref 6.3–8.6)
RBC # BLD AUTO: 5.24 10*6/MM3 (ref 3.77–5.28)
SODIUM SERPL-SCNC: 135 MMOL/L (ref 137–145)
WBC NRBC COR # BLD: 8.19 10*3/MM3 (ref 3.4–10.8)

## 2022-10-19 PROCEDURE — 83690 ASSAY OF LIPASE: CPT | Performed by: PHYSICIAN ASSISTANT

## 2022-10-19 PROCEDURE — 85025 COMPLETE CBC W/AUTO DIFF WBC: CPT | Performed by: PHYSICIAN ASSISTANT

## 2022-10-19 PROCEDURE — 36415 COLL VENOUS BLD VENIPUNCTURE: CPT

## 2022-10-19 PROCEDURE — 82150 ASSAY OF AMYLASE: CPT | Performed by: PHYSICIAN ASSISTANT

## 2022-10-19 PROCEDURE — 80053 COMPREHEN METABOLIC PANEL: CPT | Performed by: PHYSICIAN ASSISTANT

## 2022-10-20 LAB — LIPASE SERPL-CCNC: 25 U/L (ref 13–60)

## 2022-10-27 ENCOUNTER — LAB (OUTPATIENT)
Dept: LAB | Facility: OTHER | Age: 32
End: 2022-10-27

## 2022-10-27 ENCOUNTER — OFFICE VISIT (OUTPATIENT)
Dept: GASTROENTEROLOGY | Facility: CLINIC | Age: 32
End: 2022-10-27

## 2022-10-27 ENCOUNTER — OFFICE VISIT (OUTPATIENT)
Dept: FAMILY MEDICINE CLINIC | Facility: CLINIC | Age: 32
End: 2022-10-27

## 2022-10-27 VITALS
SYSTOLIC BLOOD PRESSURE: 130 MMHG | HEART RATE: 97 BPM | TEMPERATURE: 97 F | OXYGEN SATURATION: 99 % | DIASTOLIC BLOOD PRESSURE: 74 MMHG | HEIGHT: 62 IN | RESPIRATION RATE: 18 BRPM | WEIGHT: 237 LBS | BODY MASS INDEX: 43.61 KG/M2

## 2022-10-27 VITALS
DIASTOLIC BLOOD PRESSURE: 84 MMHG | BODY MASS INDEX: 43.61 KG/M2 | HEIGHT: 62 IN | HEART RATE: 96 BPM | WEIGHT: 237 LBS | SYSTOLIC BLOOD PRESSURE: 126 MMHG

## 2022-10-27 DIAGNOSIS — R93.5 ABNORMAL CT OF THE ABDOMEN: ICD-10-CM

## 2022-10-27 DIAGNOSIS — R10.9 ABDOMINAL CRAMPING: ICD-10-CM

## 2022-10-27 DIAGNOSIS — R10.84 GENERALIZED ABDOMINAL PAIN: ICD-10-CM

## 2022-10-27 DIAGNOSIS — R11.2 NAUSEA AND VOMITING, UNSPECIFIED VOMITING TYPE: ICD-10-CM

## 2022-10-27 DIAGNOSIS — K21.9 GASTROESOPHAGEAL REFLUX DISEASE, UNSPECIFIED WHETHER ESOPHAGITIS PRESENT: ICD-10-CM

## 2022-10-27 DIAGNOSIS — K92.1 HEMATOCHEZIA: ICD-10-CM

## 2022-10-27 DIAGNOSIS — R10.84 GENERALIZED ABDOMINAL PAIN: Primary | ICD-10-CM

## 2022-10-27 DIAGNOSIS — R10.30 LOWER ABDOMINAL PAIN: ICD-10-CM

## 2022-10-27 DIAGNOSIS — K92.1 HEMATOCHEZIA: Primary | ICD-10-CM

## 2022-10-27 LAB
HCT VFR BLD AUTO: 42.3 % (ref 34–46.6)
HGB BLD-MCNC: 13.7 G/DL (ref 12–15.9)

## 2022-10-27 PROCEDURE — 83516 IMMUNOASSAY NONANTIBODY: CPT | Performed by: PHYSICIAN ASSISTANT

## 2022-10-27 PROCEDURE — 86364 TISS TRNSGLTMNASE EA IG CLAS: CPT | Performed by: PHYSICIAN ASSISTANT

## 2022-10-27 PROCEDURE — 99214 OFFICE O/P EST MOD 30 MIN: CPT | Performed by: PHYSICIAN ASSISTANT

## 2022-10-27 PROCEDURE — 85014 HEMATOCRIT: CPT | Performed by: INTERNAL MEDICINE

## 2022-10-27 PROCEDURE — 85018 HEMOGLOBIN: CPT | Performed by: INTERNAL MEDICINE

## 2022-10-27 PROCEDURE — 86258 DGP ANTIBODY EACH IG CLASS: CPT | Performed by: PHYSICIAN ASSISTANT

## 2022-10-27 PROCEDURE — 82784 ASSAY IGA/IGD/IGG/IGM EACH: CPT | Performed by: PHYSICIAN ASSISTANT

## 2022-10-27 PROCEDURE — 99213 OFFICE O/P EST LOW 20 MIN: CPT | Performed by: PHYSICIAN ASSISTANT

## 2022-10-27 PROCEDURE — 86036 ANCA SCREEN EACH ANTIBODY: CPT | Performed by: PHYSICIAN ASSISTANT

## 2022-10-27 PROCEDURE — 86671 FUNGUS NES ANTIBODY: CPT | Performed by: PHYSICIAN ASSISTANT

## 2022-10-27 PROCEDURE — 86231 EMA EACH IG CLASS: CPT | Performed by: PHYSICIAN ASSISTANT

## 2022-10-27 RX ORDER — DEXTROSE AND SODIUM CHLORIDE 5; .45 G/100ML; G/100ML
30 INJECTION, SOLUTION INTRAVENOUS CONTINUOUS PRN
Status: CANCELLED | OUTPATIENT
Start: 2022-10-31

## 2022-10-27 NOTE — PROGRESS NOTES
[facility]      Chief Complaint:   No chief complaint on file.      Subjective     HPI:   Patient presents for evaluation due to chronic abdominal pain, chronic nausea and vomiting, constant heartburn despite treatment, change in bowel habits and hematochezia.  Patient had CT of the abdomen pelvis with contrast completed on 10/19/2022 that showed segmental thickening of the wall of the proximal sigmoid colon.  Patient states she has had abdominal problems since she was a child.  Denies family history of IBD or GI tract cancers.    Patient states she typically has multiple episodes of diarrhea daily however since July 2022 she has started having significant constipation.  Patient states she has also noted significant bleeding with and without bowel movements.  Patient states change in bowel habits developed after having laparoscopic hysterectomy in June.  Patient also reports daily heartburn symptoms despite taking Protonix each morning and Pepcid before bedtime.  Patient also takes Zofran for nausea.    Plan: Patient will have laboratory and stool studies completed as ordered..  Patient will be scheduled for EGD and colonoscopy with biopsies as soon as can be scheduled, for further evaluation of the abnormal CT findings and current symptoms.  Patient to continue Protonix, Pepcid and Zofran as previously prescribed.  She will be scheduled for follow-up after endoscopies and laboratory studies have been completed.  Advised patient to contact the office for any problems prior to scheduled procedures or follow-up.    Past Medical History:   Past Medical History:   Diagnosis Date   • Anxiety    • Arthritis    • Asthma    • Bipolar disease, chronic (HCC)    • Endometriosis    • GERD (gastroesophageal reflux disease)    • Gonorrhea 2006   • Kidney stone    • Mild depression    • Polycystic ovary syndrome    • PONV (postoperative nausea and vomiting)    • Prediabetes        Past  Surgical History:  Past Surgical History:   Procedure Laterality Date   •  SECTION     • SALPINGO OOPHORECTOMY Right     Large ovarian cyst remotved at time of  removed   • TONSILLECTOMY AND ADENOIDECTOMY     • TOTAL LAPAROSCOPIC HYSTERECTOMY SALPINGECTOMY N/A 2022    Procedure: TOTAL LAPAROSCOPIC HYSTERECTOMY, LEFT SALPINGO-OOPHORECTOMY, CYSTOSCOPY;  Surgeon: Sia Sunshine MD;  Location: [facility];  Service: Gynecology;  Laterality: N/A;   • WISDOM TOOTH EXTRACTION         Family History:  Family History   Problem Relation Age of Onset   • Ovarian cancer Mother    • Diabetes Father    • Heart disease Father    • Hypertension Maternal Grandmother    • Breast cancer Paternal Grandmother    • Colon cancer Neg Hx    • Endometrial cancer Neg Hx    • Ovarian cancer Neg Hx        Social History:   reports that she has been smoking cigarettes. She has a 10.00 pack-year smoking history. She has never used smokeless tobacco. She reports that she does not drink alcohol and does not use drugs.    Medications:   Prior to Admission medications    Medication Sig Start Date End Date Taking? Authorizing Provider   docusate sodium (Colace) 100 MG capsule Take 1 capsule by mouth 2 (Two) Times a Day As Needed for Constipation. 9/15/22   Pearl Womack PA-C   famotidine (PEPCID) 40 MG tablet Take 1 tablet by mouth Daily. 9/15/22   Pearl Womack PA-C   Hydrocortisone, Perianal, (Anusol-HC) 2.5 % rectal cream Insert  into the rectum 2 (Two) Times a Day.  Patient taking differently: Insert  into the rectum 2 (Two) Times a Day As Needed. 22   Pearl Womack PA-C   naproxen (Naprosyn) 500 MG tablet Take 1 tablet by mouth 2 (Two) Times a Day As Needed for Mild Pain  (back pain, menstrual pain).  Patient taking differently: Take 1 tablet by mouth 2 (Two) Times a Day As Needed for Mild Pain (back pain). 22   Pearl Womack PA-C   ondansetron (ZOFRAN) 4 MG tablet Take 1 tablet by  "mouth Every 8 (Eight) Hours As Needed for Nausea or Vomiting. 9/15/22   Pearl Womack PA-C   pantoprazole (PROTONIX) 40 MG EC tablet Take 1 tablet by mouth Daily. For acid reflux 9/15/22   Pearl Womack PA-C   phentermine (ADIPEX-P) 37.5 MG tablet Take 1 tablet by mouth Every Morning Before Breakfast. 4/21/22   Gordon, GIL Barr   polyethylene glycol (GoLYTELY) 236 g solution Starting at noon on day prior to procedure, drink 8 ounces every 30 minutes until all gone or stools are clear. May add flavor packet. 10/27/22   Aline Cazares PA-C   Ventolin  (90 Base) MCG/ACT inhaler Inhale 2 puffs Every 6 (Six) Hours As Needed for Wheezing or Shortness of Air. Ventolin name brand only. 9/15/22   Pearl Womack PA-C   HYDROcodone-acetaminophen (NORCO) 7.5-325 MG per tablet Take 1 tablet by mouth Every 6 (Six) Hours As Needed for Moderate Pain . 8/2/22 10/27/22  Angelina Rice MD       Allergies:  Penicillins, Tramadol, Fluoxetine, Latex, Morphine, Other, Bupropion, Celexa [citalopram hydrobromide], Codeine, Cymbalta [duloxetine hcl], Effexor [venlafaxine], Geodon [ziprasidone hcl], Lamictal [lamotrigine], Latuda [lurasidone hcl], Paxil [paroxetine hcl], Viibryd [vilazodone hcl], and Zoloft [sertraline hcl]    ROS:    Review of Systems   Constitutional: Negative.  Negative for fever and unexpected weight change.   HENT: Negative.  Negative for trouble swallowing.    Eyes: Negative.    Respiratory: Negative.  Negative for shortness of breath.    Cardiovascular: Negative.  Negative for chest pain.   Gastrointestinal: Positive for abdominal distention, abdominal pain, blood in stool, constipation, diarrhea, nausea and vomiting.   Endocrine: Negative.    Genitourinary: Negative.    Skin: Negative.    Neurological: Negative.    Hematological: Negative.    Psychiatric/Behavioral: Negative.      Objective     Blood pressure 126/84, pulse 96, height 157.5 cm (62\"), weight 108 kg (237 lb), last menstrual " period 05/26/2022, not currently breastfeeding.    Physical Exam  Vitals and nursing note reviewed. Exam conducted with a chaperone present.   Constitutional:       Appearance: Normal appearance.   HENT:      Head: Normocephalic and atraumatic.      Mouth/Throat:      Mouth: Mucous membranes are moist.      Pharynx: Oropharynx is clear.   Eyes:      General: No scleral icterus.     Conjunctiva/sclera: Conjunctivae normal.   Cardiovascular:      Rate and Rhythm: Normal rate and regular rhythm.   Pulmonary:      Effort: Pulmonary effort is normal.      Breath sounds: Normal breath sounds.   Abdominal:      General: Bowel sounds are normal.      Palpations: Abdomen is soft.      Tenderness: There is no abdominal tenderness. There is no guarding or rebound.   Skin:     General: Skin is warm.      Coloration: Skin is not jaundiced.   Neurological:      General: No focal deficit present.      Mental Status: She is alert.   Psychiatric:         Behavior: Behavior normal.          Assessment & Plan   Diagnoses and all orders for this visit:    1. Generalized abdominal pain (Primary)  -     Recurrent Gastrointestinal Distress; Future  -     Celiac Comprehensive Panel  -     IBD Expanded Panel; Future  -     Hemoglobin & Hematocrit, Blood; Future  -     Case Request; Standing  -     dextrose 5 % and sodium chloride 0.45 % infusion  -     Case Request  -     Fecal Leukocytes - Stool, Per Rectum  -     Ova & Parasite Examination - Stool, Per Rectum  -     Stool Culture (Reference Lab) - Stool, Per Rectum; Future    2. Abnormal CT of the abdomen  -     Recurrent Gastrointestinal Distress; Future  -     Celiac Comprehensive Panel  -     IBD Expanded Panel; Future  -     Hemoglobin & Hematocrit, Blood; Future  -     Case Request; Standing  -     dextrose 5 % and sodium chloride 0.45 % infusion  -     Case Request  -     Fecal Leukocytes - Stool, Per Rectum  -     Ova & Parasite Examination - Stool, Per Rectum  -     Stool Culture  (Reference Lab) - Stool, Per Rectum; Future    3. Hematochezia  -     Recurrent Gastrointestinal Distress; Future  -     Celiac Comprehensive Panel  -     IBD Expanded Panel; Future  -     Hemoglobin & Hematocrit, Blood; Future  -     Case Request; Standing  -     dextrose 5 % and sodium chloride 0.45 % infusion  -     Case Request  -     Fecal Leukocytes - Stool, Per Rectum  -     Ova & Parasite Examination - Stool, Per Rectum  -     Stool Culture (Reference Lab) - Stool, Per Rectum; Future    4. Nausea and vomiting, unspecified vomiting type  -     Recurrent Gastrointestinal Distress; Future  -     Celiac Comprehensive Panel  -     IBD Expanded Panel; Future  -     Hemoglobin & Hematocrit, Blood; Future  -     Case Request; Standing  -     dextrose 5 % and sodium chloride 0.45 % infusion  -     Case Request  -     Fecal Leukocytes - Stool, Per Rectum  -     Ova & Parasite Examination - Stool, Per Rectum  -     Stool Culture (Reference Lab) - Stool, Per Rectum; Future    5. Gastroesophageal reflux disease, unspecified whether esophagitis present  -     Recurrent Gastrointestinal Distress; Future  -     Celiac Comprehensive Panel  -     IBD Expanded Panel; Future  -     Hemoglobin & Hematocrit, Blood; Future  -     Case Request; Standing  -     dextrose 5 % and sodium chloride 0.45 % infusion  -     Case Request  -     Fecal Leukocytes - Stool, Per Rectum  -     Ova & Parasite Examination - Stool, Per Rectum  -     Stool Culture (Reference Lab) - Stool, Per Rectum; Future    Other orders  -     polyethylene glycol (GoLYTELY) 236 g solution; Starting at noon on day prior to procedure, drink 8 ounces every 30 minutes until all gone or stools are clear. May add flavor packet.  Dispense: 4000 mL; Refill: 0  -     Follow Anesthesia Guidelines / Protocol; Future  -     Obtain Informed Consent; Future  -     Implement Anesthesia Orders Day of Procedure; Standing  -     Obtain Informed Consent; Standing  -     POC Glucose  Once; Standing  -     Insert Peripheral IV; Standing        ESOPHAGOGASTRODUODENOSCOPY-small bowel bx (N/A), COLONOSCOPY-abnormality seen on CT of proximal sigmoid (N/A)     Diagnosis Plan   1. Generalized abdominal pain  Recurrent Gastrointestinal Distress    Celiac Comprehensive Panel    IBD Expanded Panel    Hemoglobin & Hematocrit, Blood    Case Request    dextrose 5 % and sodium chloride 0.45 % infusion    Case Request    Fecal Leukocytes - Stool, Per Rectum    Ova & Parasite Examination - Stool, Per Rectum    Stool Culture (Reference Lab) - Stool, Per Rectum      2. Abnormal CT of the abdomen  Recurrent Gastrointestinal Distress    Celiac Comprehensive Panel    IBD Expanded Panel    Hemoglobin & Hematocrit, Blood    Case Request    dextrose 5 % and sodium chloride 0.45 % infusion    Case Request    Fecal Leukocytes - Stool, Per Rectum    Ova & Parasite Examination - Stool, Per Rectum    Stool Culture (Reference Lab) - Stool, Per Rectum      3. Hematochezia  Recurrent Gastrointestinal Distress    Celiac Comprehensive Panel    IBD Expanded Panel    Hemoglobin & Hematocrit, Blood    Case Request    dextrose 5 % and sodium chloride 0.45 % infusion    Case Request    Fecal Leukocytes - Stool, Per Rectum    Ova & Parasite Examination - Stool, Per Rectum    Stool Culture (Reference Lab) - Stool, Per Rectum      4. Nausea and vomiting, unspecified vomiting type  Recurrent Gastrointestinal Distress    Celiac Comprehensive Panel    IBD Expanded Panel    Hemoglobin & Hematocrit, Blood    Case Request    dextrose 5 % and sodium chloride 0.45 % infusion    Case Request    Fecal Leukocytes - Stool, Per Rectum    Ova & Parasite Examination - Stool, Per Rectum    Stool Culture (Reference Lab) - Stool, Per Rectum      5. Gastroesophageal reflux disease, unspecified whether esophagitis present  Recurrent Gastrointestinal Distress    Celiac Comprehensive Panel    IBD Expanded Panel    Hemoglobin & Hematocrit, Blood    Case  Request    dextrose 5 % and sodium chloride 0.45 % infusion    Case Request    Fecal Leukocytes - Stool, Per Rectum    Ova & Parasite Examination - Stool, Per Rectum    Stool Culture (Reference Lab) - Stool, Per Rectum          Anticipated Surgical Procedure:  Orders Placed This Encounter   Procedures   • Fecal Leukocytes - Stool, Per Rectum     Order Specific Question:   Release to patient     Answer:   Routine Release   • Ova & Parasite Examination - Stool, Per Rectum     Order Specific Question:   Release to patient     Answer:   Routine Release   • Stool Culture (Reference Lab) - Stool, Per Rectum     Standing Status:   Future     Standing Expiration Date:   10/27/2023     Order Specific Question:   Release to patient     Answer:   Routine Release   • Recurrent Gastrointestinal Distress     Standing Status:   Future     Number of Occurrences:   1     Standing Expiration Date:   10/27/2023     Order Specific Question:   Release to patient     Answer:   Routine Release   • Celiac Comprehensive Panel     Order Specific Question:   Release to patient     Answer:   Routine Release   • IBD Expanded Panel     Standing Status:   Future     Number of Occurrences:   1     Standing Expiration Date:   10/27/2023     Order Specific Question:   Release to patient     Answer:   Routine Release   • Hemoglobin & Hematocrit, Blood     Standing Status:   Future     Number of Occurrences:   1     Standing Expiration Date:   10/27/2023     Order Specific Question:   Release to patient     Answer:   Routine Release   • Obtain Informed Consent     Standing Status:   Future     Order Specific Question:   Informed Consent Given For     Answer:   egd and colonoscopy       The risks, benefits, and alternatives of this procedure have been discussed with the patient or the responsible party- the patient understands and agrees to proceed.

## 2022-10-27 NOTE — PROGRESS NOTES
"Subjective   Marizol Elkins is a 31 y.o. female.     History of Present Illness     Pt presents today with a c/c of hematochezia x 2 months. PCP GIL Mckeon. She was previously referred to GI in august for further workup, but states \"I procrastinated and didn't go\". She reports last episode of hematochezia approximately 1 week ago. She reports episodes of hematochezia have been occurring 2-3x per week, for approximately 2 months. She reports BRBPR with wiping and in toilet bowl with bowel movements, quantified as approximately 1 teaspoon. She does admit to lower abdominal pain described as intermittent, cramping. She does admit to intermittent nausea. She denies vomiting. She does admit to occasional diarrhea, last episode yesterday (states 3 episodes in the last month). She denies chest pain, dyspnea, dizziness/lightheadedness, syncope/presyncope. She denies family hx of colon cancer or IBD. She recently underwent CT a/p on 10/19/22 which noted segmental thickening of the wall of proximal sigmoid colon, correlation with endoscopy was recommended.     The following portions of the patient's history were reviewed and updated as appropriate: allergies, current medications, past family history, past medical history, past social history, past surgical history and problem list.    Review of Systems   Constitutional: Negative.  Negative for activity change, appetite change, chills, fatigue and fever.   HENT: Negative.  Negative for congestion.    Eyes: Negative.  Negative for blurred vision, double vision, photophobia and visual disturbance.   Respiratory: Negative.  Negative for cough, chest tightness, shortness of breath and wheezing.    Cardiovascular: Negative.  Negative for chest pain, palpitations and leg swelling.   Gastrointestinal: Positive for abdominal pain, blood in stool and nausea. Negative for abdominal distention, constipation, diarrhea, vomiting, GERD and indigestion.   Endocrine: Negative.  Negative " for cold intolerance and heat intolerance.   Genitourinary: Negative.  Negative for dysuria, flank pain, frequency and urinary incontinence.   Musculoskeletal: Negative.  Negative for arthralgias and back pain.   Skin: Negative.    Allergic/Immunologic: Negative.  Negative for immunocompromised state.   Neurological: Negative.  Negative for dizziness and light-headedness.   Hematological: Negative.    Psychiatric/Behavioral: Negative.  Negative for agitation, behavioral problems, decreased concentration, dysphoric mood, hallucinations, self-injury, sleep disturbance, suicidal ideas, negative for hyperactivity, depressed mood and stress. The patient is not nervous/anxious.    All other systems reviewed and are negative.      Objective   Physical Exam  Vitals and nursing note reviewed.   Constitutional:       General: She is not in acute distress.     Appearance: Normal appearance. She is well-developed. She is obese. She is not ill-appearing or diaphoretic.   HENT:      Head: Normocephalic and atraumatic.      Right Ear: External ear normal.      Left Ear: External ear normal.   Eyes:      Conjunctiva/sclera: Conjunctivae normal.      Pupils: Pupils are equal, round, and reactive to light.   Neck:      Thyroid: No thyromegaly.      Vascular: No carotid bruit or JVD.      Trachea: No tracheal deviation.   Cardiovascular:      Rate and Rhythm: Normal rate and regular rhythm.      Pulses: Normal pulses.      Heart sounds: Normal heart sounds. No murmur heard.    No friction rub. No gallop.   Pulmonary:      Effort: Pulmonary effort is normal. No respiratory distress.      Breath sounds: Normal breath sounds. No stridor. No wheezing, rhonchi or rales.   Chest:      Chest wall: No tenderness.   Abdominal:      General: Bowel sounds are normal. There is no distension.      Palpations: Abdomen is soft.      Tenderness: There is no abdominal tenderness. There is no guarding or rebound. Negative signs include Mishra's sign,  Rovsing's sign, McBurney's sign, psoas sign and obturator sign.      Comments: Abdomen soft, nontender. BS active x 4 quadrants. Negative peritoneal signs.   Genitourinary:     Comments: Rectal exam deferred by patient.  Musculoskeletal:         General: No tenderness or deformity. Normal range of motion.      Cervical back: Normal range of motion and neck supple. No rigidity or tenderness.      Right lower leg: No edema.      Left lower leg: No edema.   Lymphadenopathy:      Cervical: No cervical adenopathy.   Skin:     General: Skin is warm and dry.      Capillary Refill: Capillary refill takes less than 2 seconds.      Coloration: Skin is not jaundiced or pale.      Findings: No bruising, erythema, lesion or rash.   Neurological:      General: No focal deficit present.      Mental Status: She is alert and oriented to person, place, and time. Mental status is at baseline.      Motor: No weakness.      Gait: Gait normal.   Psychiatric:         Mood and Affect: Mood normal.         Behavior: Behavior normal.         Thought Content: Thought content normal.         Judgment: Judgment normal.       Vitals:    10/27/22 0917   BP: 130/74   Pulse: 97   Resp: 18   Temp: 97 °F (36.1 °C)   SpO2: 99%        Assessment & Plan   Diagnoses and all orders for this visit:    1. Hematochezia (Primary)    2. Lower abdominal pain    3. Abdominal cramping      She is stable, NAD, afebrile, abdominal exam is benign with negative peritoneal signs. CBC on 10/19/22 unremarkable with wbc 8.19, hgb 13.6, hct 42.1. I reviewed and discussed patient's results of CT a/p completed on 10/19/22 which noted segmental thickening of the wall of proximal sigmoid colon and discussed with patient importance of further evaluation and follow up with GI for consideration of csy/egd to rule out concerns such as inflammatory bowel disease, infection, cancer. She has been previously referred to GI but was lost to follow up. She has a high risk of  non-compliance, I discussed her case with GI, and they will see her today for further evaluation. In office we discussed concerning s/s to immediately present to ER for further evaluation and assessment including but not limited to abdominal pain, refractory n/v, syncope/presyncope, chest pain, dyspnea, dizziness, ams/confusion, worsening/persistent symptoms      Patient educated to follow up as scheduled with pcp or sooner than next scheduled appointment if symptoms worsen or do not improve. Patient stated understanding and has agreed with plan of care. After visit summary was printed and given to patient.      This document has been electronically signed by Pearl Womack PA-C on October 27, 2022 09:58 CDT,.

## 2022-10-28 ENCOUNTER — LAB (OUTPATIENT)
Dept: LAB | Facility: OTHER | Age: 32
End: 2022-10-28

## 2022-10-28 DIAGNOSIS — K92.1 HEMATOCHEZIA: ICD-10-CM

## 2022-10-28 DIAGNOSIS — R10.84 GENERALIZED ABDOMINAL PAIN: ICD-10-CM

## 2022-10-28 DIAGNOSIS — R93.5 ABNORMAL CT OF THE ABDOMEN: ICD-10-CM

## 2022-10-28 DIAGNOSIS — K21.9 GASTROESOPHAGEAL REFLUX DISEASE, UNSPECIFIED WHETHER ESOPHAGITIS PRESENT: ICD-10-CM

## 2022-10-28 DIAGNOSIS — R11.2 NAUSEA AND VOMITING, UNSPECIFIED VOMITING TYPE: ICD-10-CM

## 2022-10-28 PROCEDURE — 87427 SHIGA-LIKE TOXIN AG IA: CPT | Performed by: PHYSICIAN ASSISTANT

## 2022-10-28 PROCEDURE — 87177 OVA AND PARASITES SMEARS: CPT | Performed by: PHYSICIAN ASSISTANT

## 2022-10-28 PROCEDURE — 87046 STOOL CULTR AEROBIC BACT EA: CPT | Performed by: PHYSICIAN ASSISTANT

## 2022-10-28 PROCEDURE — 87209 SMEAR COMPLEX STAIN: CPT | Performed by: PHYSICIAN ASSISTANT

## 2022-10-28 PROCEDURE — 87045 FECES CULTURE AEROBIC BACT: CPT | Performed by: PHYSICIAN ASSISTANT

## 2022-10-28 PROCEDURE — 87205 SMEAR GRAM STAIN: CPT | Performed by: PHYSICIAN ASSISTANT

## 2022-10-31 ENCOUNTER — HOSPITAL ENCOUNTER (OUTPATIENT)
Facility: HOSPITAL | Age: 32
Setting detail: HOSPITAL OUTPATIENT SURGERY
Discharge: HOME | End: 2022-10-31
Attending: INTERNAL MEDICINE | Admitting: PHYSICIAN ASSISTANT

## 2022-10-31 ENCOUNTER — ANESTHESIA (OUTPATIENT)
Dept: GASTROENTEROLOGY | Facility: HOSPITAL | Age: 32
End: 2022-10-31

## 2022-10-31 ENCOUNTER — ANESTHESIA EVENT (OUTPATIENT)
Dept: GASTROENTEROLOGY | Facility: HOSPITAL | Age: 32
End: 2022-10-31

## 2022-10-31 VITALS
HEART RATE: 94 BPM | WEIGHT: 233.6 LBS | OXYGEN SATURATION: 96 % | TEMPERATURE: 98.3 F | DIASTOLIC BLOOD PRESSURE: 57 MMHG | SYSTOLIC BLOOD PRESSURE: 112 MMHG | BODY MASS INDEX: 42.99 KG/M2 | RESPIRATION RATE: 20 BRPM | HEIGHT: 62 IN

## 2022-10-31 DIAGNOSIS — R11.2 NAUSEA AND VOMITING, UNSPECIFIED VOMITING TYPE: ICD-10-CM

## 2022-10-31 DIAGNOSIS — R10.84 GENERALIZED ABDOMINAL PAIN: ICD-10-CM

## 2022-10-31 DIAGNOSIS — R93.5 ABNORMAL CT OF THE ABDOMEN: ICD-10-CM

## 2022-10-31 DIAGNOSIS — K21.9 GASTROESOPHAGEAL REFLUX DISEASE, UNSPECIFIED WHETHER ESOPHAGITIS PRESENT: ICD-10-CM

## 2022-10-31 DIAGNOSIS — K92.1 HEMATOCHEZIA: ICD-10-CM

## 2022-10-31 LAB
BAKER'S YEAST IGG QN IA: 28 UNITS (ref 0–50)
CHITOBIOSIDE IGA SERPL IA-ACNC: 101 UNITS (ref 0–90)
ENDOMYSIUM IGA SER QL: NEGATIVE
GLIADIN PEPTIDE IGA SER-ACNC: 6 UNITS (ref 0–19)
GLIADIN PEPTIDE IGG SER-ACNC: 2 UNITS (ref 0–19)
IGA SERPL-MCNC: 219 MG/DL (ref 87–352)
LABORATORY COMMENT REPORT: ABNORMAL
LAMINARIBIOSIDE IGG SERPL IA-ACNC: 4 UNITS (ref 0–60)
MANNOBIOSIDE IGG SERPL IA-ACNC: 57 UNITS (ref 0–100)
P-ANCA ATYPICAL SER QL IF: NEGATIVE
TTG IGA SER-ACNC: <2 U/ML (ref 0–3)
TTG IGG SER-ACNC: 5 U/ML (ref 0–5)

## 2022-10-31 PROCEDURE — 45380 COLONOSCOPY AND BIOPSY: CPT | Performed by: INTERNAL MEDICINE

## 2022-10-31 PROCEDURE — 43239 EGD BIOPSY SINGLE/MULTIPLE: CPT | Performed by: INTERNAL MEDICINE

## 2022-10-31 PROCEDURE — 25010000002 PROPOFOL 10 MG/ML EMULSION: Performed by: NURSE ANESTHETIST, CERTIFIED REGISTERED

## 2022-10-31 RX ORDER — DEXTROSE AND SODIUM CHLORIDE 5; .45 G/100ML; G/100ML
30 INJECTION, SOLUTION INTRAVENOUS CONTINUOUS PRN
Status: DISCONTINUED | OUTPATIENT
Start: 2022-10-31 | End: 2022-10-31 | Stop reason: HOSPADM

## 2022-10-31 RX ORDER — LIDOCAINE HYDROCHLORIDE 20 MG/ML
INJECTION, SOLUTION INTRAVENOUS AS NEEDED
Status: DISCONTINUED | OUTPATIENT
Start: 2022-10-31 | End: 2022-10-31 | Stop reason: SURG

## 2022-10-31 RX ORDER — PROPOFOL 10 MG/ML
VIAL (ML) INTRAVENOUS AS NEEDED
Status: DISCONTINUED | OUTPATIENT
Start: 2022-10-31 | End: 2022-10-31 | Stop reason: SURG

## 2022-10-31 RX ADMIN — PROPOFOL 30 MG: 10 INJECTION, EMULSION INTRAVENOUS at 10:39

## 2022-10-31 RX ADMIN — PROPOFOL 10 MG: 10 INJECTION, EMULSION INTRAVENOUS at 10:47

## 2022-10-31 RX ADMIN — DEXTROSE AND SODIUM CHLORIDE 30 ML/HR: 5; 450 INJECTION, SOLUTION INTRAVENOUS at 10:13

## 2022-10-31 RX ADMIN — PROPOFOL 20 MG: 10 INJECTION, EMULSION INTRAVENOUS at 10:44

## 2022-10-31 RX ADMIN — PROPOFOL 200 MG: 10 INJECTION, EMULSION INTRAVENOUS at 10:38

## 2022-10-31 RX ADMIN — LIDOCAINE HYDROCHLORIDE 100 MG: 20 INJECTION, SOLUTION INTRAVENOUS at 10:38

## 2022-10-31 NOTE — ANESTHESIA PREPROCEDURE EVALUATION
Anesthesia Evaluation     history of anesthetic complications:  NPO Solid Status: > 8 hours  NPO Liquid Status: > 2 hours           Airway   Mallampati: II  TM distance: >3 FB  Neck ROM: full  no difficulty expected  Dental - normal exam     Pulmonary - normal exam   (+) asthma,  Cardiovascular - normal exam        Neuro/Psych  (+) psychiatric history,    GI/Hepatic/Renal/Endo    (+) morbid obesity, GERD,  renal disease,     Musculoskeletal     Abdominal    Substance History      OB/GYN          Other   arthritis,                      Anesthesia Plan    ASA 3     general     intravenous induction     Anesthetic plan, risks, benefits, and alternatives have been provided, discussed and informed consent has been obtained with: patient.        CODE STATUS:

## 2022-10-31 NOTE — ANESTHESIA POSTPROCEDURE EVALUATION
Patient: Marizol Elkins    Procedure Summary     Date: 10/31/22 Room / Location: [facility] / [facility] system ENDOSCOPY    Anesthesia Start: 1036 Anesthesia Stop: 1052    Procedures:       ESOPHAGOGASTRODUODENOSCOPY-small bowel bx      COLONOSCOPY-abnormality seen on CT of proximal sigmoid Diagnosis:       Generalized abdominal pain      Abnormal CT of the abdomen      Hematochezia      Nausea and vomiting, unspecified vomiting type      Gastroesophageal reflux disease, unspecified whether esophagitis present      (Generalized abdominal pain [R10.84])      (Abnormal CT of the abdomen [R93.5])      (Hematochezia [K92.1])      (Nausea and vomiting, unspecified vomiting type [R11.2])      (Gastroesophageal reflux disease, unspecified whether esophagitis present [K21.9])    Surgeons: Ray Quevedo MD Provider: Choco Richardson CRNA    Anesthesia Type: general ASA Status: 3          Anesthesia Type: general    Vitals  No vitals data found for the desired time range.          Post Anesthesia Care and Evaluation    Patient location during evaluation: bedside  Patient participation: waiting for patient participation  Level of consciousness: responsive to verbal stimuli  Pain management: adequate    Airway patency: patent  Anesthetic complications: No anesthetic complications  PONV Status: none  Cardiovascular status: acceptable  Respiratory status: acceptable  Hydration status: acceptable    Comments: ---------------------------               10/31/22                      1012         ---------------------------   BP:          124/67         Pulse:         96           Resp:          20           Temp:   97.5 °F (36.4 °C)   SpO2:          96%         ---------------------------

## 2022-11-02 LAB
BACTERIA SPEC CULT: NORMAL
BACTERIA SPEC CULT: NORMAL
CAMPYLOBACTER STL CULT: NORMAL
E COLI SXT STL QL IA: NEGATIVE
O+P SPEC MICRO: NORMAL
O+P STL CONC: NORMAL
SALM + SHIG STL CULT: NORMAL
WBC STL QL MICRO: NORMAL
WBC STL QL MICRO: NORMAL

## 2022-11-04 LAB — REF LAB TEST METHOD: NORMAL

## 2022-11-07 ENCOUNTER — OFFICE VISIT (OUTPATIENT)
Dept: GASTROENTEROLOGY | Facility: CLINIC | Age: 32
End: 2022-11-07

## 2022-11-07 VITALS
WEIGHT: 234 LBS | HEIGHT: 62 IN | DIASTOLIC BLOOD PRESSURE: 80 MMHG | HEART RATE: 80 BPM | BODY MASS INDEX: 43.06 KG/M2 | SYSTOLIC BLOOD PRESSURE: 122 MMHG

## 2022-11-07 DIAGNOSIS — K64.8 OTHER HEMORRHOIDS: ICD-10-CM

## 2022-11-07 DIAGNOSIS — K21.9 GASTROESOPHAGEAL REFLUX DISEASE WITHOUT ESOPHAGITIS: ICD-10-CM

## 2022-11-07 DIAGNOSIS — R11.0 NAUSEA: ICD-10-CM

## 2022-11-07 DIAGNOSIS — K21.9 GASTROESOPHAGEAL REFLUX DISEASE: ICD-10-CM

## 2022-11-07 DIAGNOSIS — K59.00 CONSTIPATION, UNSPECIFIED CONSTIPATION TYPE: Primary | ICD-10-CM

## 2022-11-07 PROCEDURE — 99214 OFFICE O/P EST MOD 30 MIN: CPT | Performed by: PHYSICIAN ASSISTANT

## 2022-11-07 RX ORDER — PANTOPRAZOLE SODIUM 40 MG/1
40 TABLET, DELAYED RELEASE ORAL DAILY
Qty: 90 TABLET | Refills: 2 | Status: SHIPPED | OUTPATIENT
Start: 2022-11-07

## 2022-11-07 RX ORDER — ONDANSETRON 4 MG/1
4 TABLET, FILM COATED ORAL EVERY 8 HOURS PRN
Qty: 30 TABLET | Refills: 0 | Status: SHIPPED | OUTPATIENT
Start: 2022-11-07

## 2022-11-07 RX ORDER — HYDROCORTISONE ACETATE 25 MG/1
25 SUPPOSITORY RECTAL 2 TIMES DAILY PRN
Qty: 30 SUPPOSITORY | Refills: 0 | Status: SHIPPED | OUTPATIENT
Start: 2022-11-07

## 2022-11-07 RX ORDER — FAMOTIDINE 40 MG/1
40 TABLET, FILM COATED ORAL DAILY
Qty: 90 TABLET | Refills: 2 | Status: SHIPPED | OUTPATIENT
Start: 2022-11-07

## 2022-11-07 NOTE — PROGRESS NOTES
[facility]      Chief Complaint:   Chief Complaint   Patient presents with   • Follow-up       Subjective     HPI:   Patient presents for follow-up after having endoscopy completed due to chronic abdominal pain, chronic nausea vomiting, heartburn, change in bowel habits/constipation and hematochezia.  Patient previously had CT of the abdomen pelvis on 10/19/2022 that showed a segmental thickening of the wall of the proximal sigmoid colon.  Patient underwent endoscopy for further evaluation.    Patient reports today that heartburn has improved with Protonix each morning and Pepcid in the evening.  States her nausea and vomiting have improved but she occasionally uses Zofran as needed.  Denies dysphagia.  Patient continues to have constipation but has had no further episodes of bleeding.    Patient's colonoscopy showed adequate prep, external/internal hemorrhoids but no abnormality seen throughout the colon.  Multiple biopsies taken from the colon and terminal ileum which showed no significant histologic abnormality.  EGD showed grade 3 esophagitis and diffuse moderate inflammation of the stomach.  Biopsies showed active chronic gastritis, mild and esophageal biopsies showed reactive squamous mucosa.    Plan: Patient will continue on Protonix and Pepcid as previously prescribed.  I will refill those and also refill her Zofran as needed.  I will start her on Linzess 145 mcg daily for her constipation and prescribed Anusol HC suppositories for her hemorrhoids.  She will follow-up in 1 month for recheck or sooner for any problems prior to scheduled follow-up.  Screening colonoscopy will be due at age 43.    Past Medical History:   Past Medical History:   Diagnosis Date   • Anxiety    • Arthritis    • Asthma    • Bipolar disease, chronic (HCC)    • Endometriosis    • GERD (gastroesophageal reflux disease)    • Gonorrhea 2006   • Kidney stone    • Mild depression    • Polycystic  ovary syndrome    • PONV (postoperative nausea and vomiting)    • Prediabetes        Past Surgical History:  Past Surgical History:   Procedure Laterality Date   •  SECTION  2010   • HYSTERECTOMY     • SALPINGO OOPHORECTOMY Right 2010    Large ovarian cyst remotved at time of  removed   • TONSILLECTOMY AND ADENOIDECTOMY     • TOTAL LAPAROSCOPIC HYSTERECTOMY SALPINGECTOMY N/A 2022    Procedure: TOTAL LAPAROSCOPIC HYSTERECTOMY, LEFT SALPINGO-OOPHORECTOMY, CYSTOSCOPY;  Surgeon: Sia Sunshine MD;  Location: [facility];  Service: Gynecology;  Laterality: N/A;   • WISDOM TOOTH EXTRACTION         Family History:  Family History   Problem Relation Age of Onset   • Ovarian cancer Mother    • Diabetes Father    • Heart disease Father    • Hypertension Maternal Grandmother    • Breast cancer Paternal Grandmother    • Colon cancer Neg Hx    • Endometrial cancer Neg Hx    • Ovarian cancer Neg Hx        Social History:   reports that she has been smoking cigarettes. She has a 10.00 pack-year smoking history. She has never used smokeless tobacco. She reports that she does not drink alcohol and does not use drugs.    Medications:   Prior to Admission medications    Medication Sig Start Date End Date Taking? Authorizing Provider   famotidine (PEPCID) 40 MG tablet Take 1 tablet by mouth Daily. 22  Yes Aline Cazares PA-C   ondansetron (ZOFRAN) 4 MG tablet Take 1 tablet by mouth Every 8 (Eight) Hours As Needed for Nausea or Vomiting. 22  Yes Aline Cazares PA-C   pantoprazole (PROTONIX) 40 MG EC tablet Take 1 tablet by mouth Daily. For acid reflux 22  Yes Aline Cazares PA-C   albuterol sulfate  (90 Base) MCG/ACT inhaler Inhale 2 puffs Every 4 (Four) Hours As Needed for Wheezing or Shortness of Air (cough). 22   Adamaris Brunner APRN   docusate sodium (Colace) 100 MG capsule Take 1 capsule by mouth 2 (Two) Times a Day As Needed for Constipation. 9/15/22   Vu  REJI Kang   hydrocortisone (ANUSOL-HC) 25 MG suppository Insert 1 suppository into the rectum 2 (Two) Times a Day As Needed for Hemorrhoids (rectal discomfort). 11/7/22   Aline Cazares PA-C   Hydrocortisone, Perianal, (Anusol-HC) 2.5 % rectal cream Insert  into the rectum 2 (Two) Times a Day. 8/16/22   Pearl Womack PA-C   linaclotide (Linzess) 145 MCG capsule capsule Take 1 capsule by mouth Every Morning Before Breakfast. 11/7/22   Aline Cazares PA-C   oseltamivir (TAMIFLU) 75 MG capsule Take 1 capsule by mouth 2 (Two) Times a Day for 5 days. 11/2/22 11/7/22  Adamaris Brunner APRN   phentermine (ADIPEX-P) 37.5 MG tablet Take 1 tablet by mouth Every Morning Before Breakfast. 4/21/22   Juhi Gordon APRN   famotidine (PEPCID) 40 MG tablet Take 1 tablet by mouth Daily. 9/15/22 11/7/22  Pearl Womack PA-C   ondansetron (ZOFRAN) 4 MG tablet Take 1 tablet by mouth Every 8 (Eight) Hours As Needed for Nausea or Vomiting. 9/15/22 11/7/22  Pearl Womack PA-C   pantoprazole (PROTONIX) 40 MG EC tablet Take 1 tablet by mouth Daily. For acid reflux 9/15/22 11/7/22  Pearl Womack PA-C   polyethylene glycol (GoLYTELY) 236 g solution Starting at noon on day prior to procedure, drink 8 ounces every 30 minutes until all gone or stools are clear. May add flavor packet. 10/27/22 11/7/22  Aline Cazares PA-C       Allergies:  Penicillins, Tramadol, Fluoxetine, Latex, Morphine, Other, Bupropion, Celexa [citalopram hydrobromide], Codeine, Cymbalta [duloxetine hcl], Effexor [venlafaxine], Geodon [ziprasidone hcl], Lamictal [lamotrigine], Latuda [lurasidone hcl], Paxil [paroxetine hcl], Viibryd [vilazodone hcl], and Zoloft [sertraline hcl]    ROS:    Review of Systems   Constitutional: Negative.  Negative for fever and unexpected weight change (up 1 lb since last visits).   HENT: Negative.  Negative for trouble swallowing.    Eyes: Negative.    Respiratory: Negative.  Negative for shortness of breath.   "  Cardiovascular: Negative.  Negative for chest pain.   Gastrointestinal: Positive for abdominal distention, abdominal pain, constipation and nausea.   Endocrine: Negative.    Genitourinary: Negative.    Skin: Negative.    Neurological: Negative.    Hematological: Negative.    Psychiatric/Behavioral: Negative.      Objective     Blood pressure 122/80, pulse 80, height 157.5 cm (62\"), weight 106 kg (234 lb), last menstrual period 05/26/2022, not currently breastfeeding.    Physical Exam  Vitals and nursing note reviewed. Exam conducted with a chaperone present.   Constitutional:       Appearance: Normal appearance.   HENT:      Head: Normocephalic and atraumatic.   Eyes:      Conjunctiva/sclera: Conjunctivae normal.   Cardiovascular:      Rate and Rhythm: Normal rate and regular rhythm.   Pulmonary:      Effort: Pulmonary effort is normal.      Breath sounds: Normal breath sounds.   Abdominal:      General: Bowel sounds are normal.      Palpations: Abdomen is soft.      Tenderness: There is no abdominal tenderness. There is no guarding.   Neurological:      General: No focal deficit present.      Mental Status: She is alert.   Psychiatric:         Behavior: Behavior normal.          Assessment & Plan   Diagnoses and all orders for this visit:    1. Constipation, unspecified constipation type (Primary)    2. Gastroesophageal reflux disease without esophagitis    3. Nausea  -     ondansetron (ZOFRAN) 4 MG tablet; Take 1 tablet by mouth Every 8 (Eight) Hours As Needed for Nausea or Vomiting.  Dispense: 30 tablet; Refill: 0    4. Gastroesophageal reflux disease  -     pantoprazole (PROTONIX) 40 MG EC tablet; Take 1 tablet by mouth Daily. For acid reflux  Dispense: 90 tablet; Refill: 2  -     famotidine (PEPCID) 40 MG tablet; Take 1 tablet by mouth Daily.  Dispense: 90 tablet; Refill: 2    5. Other hemorrhoids    Other orders  -     linaclotide (Linzess) 145 MCG capsule capsule; Take 1 capsule by mouth Every Morning " Before Breakfast.  Dispense: 90 capsule; Refill: 3  -     hydrocortisone (ANUSOL-HC) 25 MG suppository; Insert 1 suppository into the rectum 2 (Two) Times a Day As Needed for Hemorrhoids (rectal discomfort).  Dispense: 30 suppository; Refill: 0        * Surgery not found *     Diagnosis Plan   1. Constipation, unspecified constipation type        2. Gastroesophageal reflux disease without esophagitis        3. Nausea  ondansetron (ZOFRAN) 4 MG tablet      4. Gastroesophageal reflux disease  pantoprazole (PROTONIX) 40 MG EC tablet    famotidine (PEPCID) 40 MG tablet      5. Other hemorrhoids            Anticipated Surgical Procedure:  No orders of the defined types were placed in this encounter.      The risks, benefits, and alternatives of this procedure have been discussed with the patient or the responsible party- the patient understands and agrees to proceed.

## 2023-08-09 DIAGNOSIS — R11.0 NAUSEA: ICD-10-CM

## 2023-08-09 RX ORDER — ONDANSETRON 4 MG/1
TABLET, FILM COATED ORAL
Qty: 30 TABLET | Refills: 0 | OUTPATIENT
Start: 2023-08-09

## (undated) DEVICE — ENDOPATH XCEL BLADELESS TROCARS WITH STABILITY SLEEVES: Brand: ENDOPATH XCEL

## (undated) DEVICE — CORE TRUMPET FOR SINGLE SOLUTION BAG: Brand: CORE DYNAMICS

## (undated) DEVICE — GLV SURG SENSICARE PI ORTHO SZ6.5 LF STRL

## (undated) DEVICE — VCARE MEDIUM, UTERINE MANIPULATOR, VAGINAL-CERVICAL-AHLUWALIA'S-RETRACTOR-ELEVATOR: Brand: VCARE

## (undated) DEVICE — SOL IRRIG NACL 1000ML

## (undated) DEVICE — ENDOPOUCH RETRIEVER SPECIMEN RETRIEVAL BAGS: Brand: ENDOPOUCH RETRIEVER

## (undated) DEVICE — SUTURING DEVICE: Brand: ENDO STITCH

## (undated) DEVICE — MONOPOLAR METZENBAUM SCISSOR TIP, DISPOSABLE: Brand: MONOPOLAR METZENBAUM SCISSOR TIP, DISPOSABLE

## (undated) DEVICE — SYR LUERLOK 50ML

## (undated) DEVICE — SUT VIC 0 CT1 36IN J946H

## (undated) DEVICE — TOTAL TRAY, 16FR 10ML SIL FOLEY, URN: Brand: MEDLINE

## (undated) DEVICE — VISUALIZATION SYSTEM: Brand: CLEARIFY

## (undated) DEVICE — NDL HYPO SFTY GLD 22G 1 1/2IN

## (undated) DEVICE — SUT MNCRYL 3/0 Y936H

## (undated) DEVICE — PK LAP GYN 60

## (undated) DEVICE — PENCL ES MEGADINE EZ/CLEAN BUTN W/HOLSTR 10FT

## (undated) DEVICE — ENSEAL X1 TISSUE SEALER, CURVED JAW, 37 CM SHAFT LENGTH: Brand: ENSEAL

## (undated) DEVICE — APPL HEMOS FOR DELIVERY FLOSEAL

## (undated) DEVICE — SINGLE-USE BIOPSY FORCEPS: Brand: RADIAL JAW 4

## (undated) DEVICE — DRAPE UNDERBUTTOCKS W FLUID POUCH 40X44IN

## (undated) DEVICE — STERILE POLYISOPRENE POWDER-FREE SURGICAL GLOVES WITH EMOLLIENT COATING: Brand: PROTEXIS

## (undated) DEVICE — CYSTO/BLADDER IRRIGATION SET, REGULATING CLAMP

## (undated) DEVICE — SYS CLS PORTSITE CT CLOSESURE 5AND10/12

## (undated) DEVICE — INTENDED FOR TISSUE SEPARATION, AND OTHER PROCEDURES THAT REQUIRE A SHARP SURGICAL BLADE TO PUNCTURE OR CUT.: Brand: BARD-PARKER ® CARBON RIB-BACK BLADES

## (undated) DEVICE — SOL IRR NACL 0.9PCT BT 1000ML

## (undated) DEVICE — LAPAROSCOPIC SMOKE FILTRATION SYSTEM: Brand: PALL LAPAROSHIELD® PLUS LAPAROSCOPIC SMOKE FILTRATION SYSTEM

## (undated) DEVICE — PATIENT RETURN ELECTRODE, SINGLE-USE, CONTACT QUALITY MONITORING, ADULT, WITH 9FT CORD, FOR PATIENTS WEIGING OVER 33LBS. (15KG): Brand: MEGADYNE

## (undated) DEVICE — GAUZE,SPONGE,4"X4",16PLY,XRAY,STRL,LF: Brand: MEDLINE

## (undated) DEVICE — ADHS SKIN PREMIERPRO EXOFIN TOPICAL HI/VISC .5ML

## (undated) DEVICE — ELECTRD E/S MEGADYNE EZCLEAN L/WR LAP 5MM 33CM 1P/U

## (undated) DEVICE — BITEBLOCK ENDO W/STRAP 60F A/ LF DISP